# Patient Record
Sex: FEMALE | Race: WHITE | Employment: FULL TIME | URBAN - METROPOLITAN AREA
[De-identification: names, ages, dates, MRNs, and addresses within clinical notes are randomized per-mention and may not be internally consistent; named-entity substitution may affect disease eponyms.]

---

## 2016-12-14 LAB
CHOLESTEROL, TOTAL: 229 MG/DL
CHOLESTEROL, TOTAL: 229 MG/DL
CHOLESTEROL/HDL RATIO: NORMAL
GLUCOSE BLD-MCNC: 88 MG/DL
HDLC SERPL-MCNC: 68 MG/DL (ref 35–70)
LDL CHOLESTEROL CALCULATED: 138 MG/DL (ref 0–160)
TRIGL SERPL-MCNC: 115 MG/DL
VLDLC SERPL CALC-MCNC: NORMAL MG/DL

## 2017-01-29 DIAGNOSIS — K21.9 GASTROESOPHAGEAL REFLUX DISEASE, ESOPHAGITIS PRESENCE NOT SPECIFIED: ICD-10-CM

## 2017-01-31 RX ORDER — PANTOPRAZOLE SODIUM 40 MG/1
TABLET, DELAYED RELEASE ORAL
Qty: 30 TABLET | Refills: 4 | Status: SHIPPED | OUTPATIENT
Start: 2017-01-31 | End: 2017-05-22 | Stop reason: SDUPTHER

## 2017-03-04 DIAGNOSIS — F41.9 ANXIETY: ICD-10-CM

## 2017-03-06 RX ORDER — LORAZEPAM 0.5 MG/1
TABLET ORAL
Qty: 30 TABLET | Refills: 2 | OUTPATIENT
Start: 2017-03-06

## 2017-04-05 RX ORDER — HYDROCHLOROTHIAZIDE 25 MG/1
TABLET ORAL
Qty: 15 TABLET | Refills: 0 | Status: SHIPPED | OUTPATIENT
Start: 2017-04-05 | End: 2017-05-22 | Stop reason: SDUPTHER

## 2017-04-05 RX ORDER — HYDROCHLOROTHIAZIDE 25 MG/1
TABLET ORAL
Qty: 30 TABLET | Refills: 0 | OUTPATIENT
Start: 2017-04-05

## 2017-04-25 RX ORDER — HYDROCHLOROTHIAZIDE 25 MG/1
TABLET ORAL
Qty: 15 TABLET | Refills: 0 | OUTPATIENT
Start: 2017-04-25

## 2017-05-22 ENCOUNTER — OFFICE VISIT (OUTPATIENT)
Dept: FAMILY MEDICINE CLINIC | Age: 52
End: 2017-05-22
Payer: COMMERCIAL

## 2017-05-22 VITALS
BODY MASS INDEX: 26.54 KG/M2 | WEIGHT: 149.8 LBS | SYSTOLIC BLOOD PRESSURE: 136 MMHG | RESPIRATION RATE: 18 BRPM | OXYGEN SATURATION: 98 % | TEMPERATURE: 97 F | HEIGHT: 63 IN | DIASTOLIC BLOOD PRESSURE: 87 MMHG | HEART RATE: 74 BPM

## 2017-05-22 DIAGNOSIS — Z76.0 MEDICATION REFILL: ICD-10-CM

## 2017-05-22 DIAGNOSIS — F41.9 ANXIETY: Primary | ICD-10-CM

## 2017-05-22 PROCEDURE — 99213 OFFICE O/P EST LOW 20 MIN: CPT | Performed by: NURSE PRACTITIONER

## 2017-05-22 RX ORDER — ESCITALOPRAM OXALATE 10 MG/1
TABLET ORAL
Qty: 30 TABLET | Refills: 5 | Status: SHIPPED | OUTPATIENT
Start: 2017-05-22 | End: 2018-01-25 | Stop reason: SDUPTHER

## 2017-05-22 RX ORDER — PANTOPRAZOLE SODIUM 40 MG/1
TABLET, DELAYED RELEASE ORAL
Qty: 30 TABLET | Refills: 5 | Status: SHIPPED | OUTPATIENT
Start: 2017-05-22 | End: 2018-01-23 | Stop reason: SDUPTHER

## 2017-05-22 RX ORDER — LORAZEPAM 0.5 MG/1
0.5 TABLET ORAL DAILY PRN
Qty: 30 TABLET | Refills: 1 | Status: SHIPPED | OUTPATIENT
Start: 2017-05-22 | End: 2018-01-25 | Stop reason: SDUPTHER

## 2017-05-22 RX ORDER — HYDROCHLOROTHIAZIDE 25 MG/1
TABLET ORAL
Qty: 30 TABLET | Refills: 5 | Status: SHIPPED | OUTPATIENT
Start: 2017-05-22 | End: 2017-12-10 | Stop reason: SDUPTHER

## 2017-05-22 RX ORDER — BUPROPION HYDROCHLORIDE 150 MG/1
150 TABLET ORAL DAILY
Qty: 30 TABLET | Refills: 5 | Status: SHIPPED | OUTPATIENT
Start: 2017-05-22 | End: 2017-12-10 | Stop reason: SDUPTHER

## 2017-05-22 ASSESSMENT — ENCOUNTER SYMPTOMS
CONSTIPATION: 0
RHINORRHEA: 0
CHEST TIGHTNESS: 0
SHORTNESS OF BREATH: 0
BLOOD IN STOOL: 0
COUGH: 0
ABDOMINAL PAIN: 0
DIARRHEA: 0
SINUS PRESSURE: 0
EYE DISCHARGE: 0

## 2017-05-22 ASSESSMENT — PATIENT HEALTH QUESTIONNAIRE - PHQ9
1. LITTLE INTEREST OR PLEASURE IN DOING THINGS: 0
SUM OF ALL RESPONSES TO PHQ9 QUESTIONS 1 & 2: 0
2. FEELING DOWN, DEPRESSED OR HOPELESS: 0
SUM OF ALL RESPONSES TO PHQ QUESTIONS 1-9: 0

## 2017-06-12 RX ORDER — ESCITALOPRAM OXALATE 20 MG/1
TABLET ORAL
Qty: 15 TABLET | Refills: 2 | OUTPATIENT
Start: 2017-06-12

## 2017-08-16 PROBLEM — Z41.1 ENCOUNTER FOR COSMETIC SURGERY: Status: ACTIVE | Noted: 2017-08-16

## 2018-01-23 DIAGNOSIS — Z76.0 MEDICATION REFILL: ICD-10-CM

## 2018-01-23 NOTE — TELEPHONE ENCOUNTER
Last filled 5/22/17 #30 with 5 RF  Last seen 5/22/17. Next Visit Date:  Future Appointments  Date Time Provider Tania Lagos   1/25/2018 11:30 AM Vianey Armando CNP [facility] [address] Maintenance   Topic Date Due    HIV screen  12/13/1982    DTaP/Tdap/Td vaccine (1 - Tdap) 12/13/1986    Flu vaccine (1) 09/01/2017    Breast cancer screen  12/13/2017    Colon cancer screen colonoscopy  12/13/2017    Potassium monitoring  03/06/2018    Creatinine monitoring  03/06/2018    Diabetes screen  05/22/2018    Cervical cancer screen  08/21/2018    Lipid screen  12/14/2021       Hemoglobin A1C (%)   Date Value   05/22/2015 5.0             ( goal A1C is < 7)   Microalb/Crt.  Ratio (mcg/mg creat)   Date Value   05/22/2015 19     LDL Cholesterol (mg/dL)   Date Value   05/22/2015 135 (H)     LDL Calculated (mg/dL)   Date Value   12/14/2016 138       (goal LDL is <100)   AST (U/L)   Date Value   05/22/2015 15     ALT (U/L)   Date Value   05/22/2015 11     BUN (mg/dL)   Date Value   03/06/2017 15     BP Readings from Last 3 Encounters:   08/14/17 (!) 146/81   07/06/17 (!) 151/99   05/22/17 136/87          (goal 120/80)    All Future Testing planned in [facility]              Patient Active Problem List:     Anxiety     Depression     GERD (gastroesophageal reflux disease)     Hyperlipidemia     Anemia     Endometriosis     ASCUS (atypical squamous cells of undetermined significance) on Pap smear     Neoplasm of uncertain behavior of skin     Essential hypertension     Encounter for cosmetic surgery

## 2018-01-24 RX ORDER — PANTOPRAZOLE SODIUM 40 MG/1
TABLET, DELAYED RELEASE ORAL
Qty: 90 TABLET | Refills: 1 | Status: SHIPPED | OUTPATIENT
Start: 2018-01-24 | End: 2018-07-29 | Stop reason: SDUPTHER

## 2018-01-25 ENCOUNTER — OFFICE VISIT (OUTPATIENT)
Dept: FAMILY MEDICINE CLINIC | Age: 53
End: 2018-01-25
Payer: COMMERCIAL

## 2018-01-25 VITALS
OXYGEN SATURATION: 100 % | HEART RATE: 84 BPM | DIASTOLIC BLOOD PRESSURE: 80 MMHG | WEIGHT: 156 LBS | TEMPERATURE: 97.2 F | BODY MASS INDEX: 27.64 KG/M2 | SYSTOLIC BLOOD PRESSURE: 132 MMHG | HEIGHT: 63 IN | RESPIRATION RATE: 20 BRPM

## 2018-01-25 DIAGNOSIS — Z12.11 SCREENING FOR MALIGNANT NEOPLASM OF COLON: ICD-10-CM

## 2018-01-25 DIAGNOSIS — Z76.0 MEDICATION REFILL: ICD-10-CM

## 2018-01-25 DIAGNOSIS — F41.9 ANXIETY: Primary | ICD-10-CM

## 2018-01-25 DIAGNOSIS — Z12.39 SCREENING FOR MALIGNANT NEOPLASM OF BREAST: ICD-10-CM

## 2018-01-25 PROCEDURE — 99214 OFFICE O/P EST MOD 30 MIN: CPT | Performed by: NURSE PRACTITIONER

## 2018-01-25 RX ORDER — LORAZEPAM 0.5 MG/1
0.5 TABLET ORAL DAILY PRN
Qty: 30 TABLET | Refills: 1 | Status: SHIPPED | OUTPATIENT
Start: 2018-01-25 | End: 2018-03-26

## 2018-01-25 RX ORDER — BUPROPION HYDROCHLORIDE 150 MG/1
TABLET ORAL
Qty: 30 TABLET | Refills: 5 | Status: SHIPPED | OUTPATIENT
Start: 2018-01-25 | End: 2018-11-26 | Stop reason: SDUPTHER

## 2018-01-25 RX ORDER — HYDROCHLOROTHIAZIDE 25 MG/1
TABLET ORAL
Qty: 30 TABLET | Refills: 5 | Status: SHIPPED | OUTPATIENT
Start: 2018-01-25 | End: 2018-09-23 | Stop reason: SDUPTHER

## 2018-01-25 RX ORDER — ESCITALOPRAM OXALATE 10 MG/1
TABLET ORAL
Qty: 30 TABLET | Refills: 5 | Status: SHIPPED | OUTPATIENT
Start: 2018-01-25 | End: 2018-11-26 | Stop reason: SDUPTHER

## 2018-01-25 ASSESSMENT — ENCOUNTER SYMPTOMS
COUGH: 0
SHORTNESS OF BREATH: 0

## 2018-01-25 NOTE — PROGRESS NOTES
Date    COLONOSCOPY  2004    wnl    ENDOMETRIAL BIOPSY  5/18/2012    due to menorrhagia    LAPAROSCOPY  8/22/12    Dx-->Operative, ablation of endometriosis, drainage of Rt Ovarian cyst, pelvic lavage    LEEP  2006    ecc    SKIN BIOPSY Bilateral 6/6/2001    cheeks---intradermal nevus    UPPER GASTROINTESTINAL ENDOSCOPY      UPPER GASTROINTESTINAL ENDOSCOPY  2004    gastritis     Family History   Problem Relation Age of Onset    Uterine Cancer Paternal Grandmother     Colon Cancer Paternal Grandmother     Heart Disease Father     Cancer Maternal Grandmother      bone and breast    Heart Disease Maternal Grandfather      Social History   Substance Use Topics    Smoking status: Never Smoker    Smokeless tobacco: Never Used    Alcohol use Yes      Comment: social      Allergies   Allergen Reactions    Codeine Hives, Nausea And Vomiting and Nausea Only    Oxycodone-Acetaminophen Hives     bilat eyes; no rashes    Percocet [Oxycodone-Acetaminophen] Hives     bilat eyes; no rashes    Simvastatin Rash       Subjective:      Review of Systems   Constitutional: Negative for chills and fever. Respiratory: Negative for cough and shortness of breath. Cardiovascular: Negative for chest pain, palpitations and leg swelling. Psychiatric/Behavioral: The patient is nervous/anxious. Other pertinent ROS in HPI  Objective:     /80 (Site: Left Arm, Position: Sitting, Cuff Size: Medium Adult)   Pulse 84   Temp 97.2 °F (36.2 °C) (Oral)   Resp 20   Ht 5' 2.99\" (1.6 m)   Wt 156 lb (70.8 kg)   SpO2 100%   BMI 27.64 kg/m²    Physical Exam   Constitutional: She is oriented to person, place, and time. She appears well-developed and well-nourished. No distress. HENT:   Head: Normocephalic and atraumatic.    Right Ear: External ear normal.   Left Ear: External ear normal.   Nose: Nose normal.   Mouth/Throat: Oropharynx is clear and moist.   Eyes: Conjunctivae and EOM are normal. Pupils are equal, round,

## 2018-03-02 ENCOUNTER — HOSPITAL ENCOUNTER (OUTPATIENT)
Dept: MAMMOGRAPHY | Age: 53
Discharge: HOME | End: 2018-03-04
Payer: COMMERCIAL

## 2018-03-02 ENCOUNTER — HOSPITAL ENCOUNTER (OUTPATIENT)
Age: 53
Discharge: HOME | End: 2018-03-02
Payer: COMMERCIAL

## 2018-03-02 DIAGNOSIS — Z12.39 SCREENING FOR MALIGNANT NEOPLASM OF BREAST: ICD-10-CM

## 2018-03-02 LAB — FOLLICLE STIMULATING HORMONE: 124.5 U/L (ref 1.7–21.5)

## 2018-03-02 PROCEDURE — 83001 ASSAY OF GONADOTROPIN (FSH): CPT

## 2018-03-02 PROCEDURE — 77063 BREAST TOMOSYNTHESIS BI: CPT

## 2018-03-02 PROCEDURE — 36415 COLL VENOUS BLD VENIPUNCTURE: CPT

## 2018-07-02 DIAGNOSIS — F41.9 CHRONIC ANXIETY: Primary | ICD-10-CM

## 2018-07-02 NOTE — TELEPHONE ENCOUNTER
Last seen and Filled 1/25/18 #30 with 1 RF    Next Visit Date:  No future appointments. Health Maintenance   Topic Date Due    HIV screen  12/13/1982    Shingles Vaccine (1 of 2 - 2 Dose Series) 12/13/2017    Colon cancer screen colonoscopy  12/13/2017    Potassium monitoring  03/06/2018    Creatinine monitoring  03/06/2018    Diabetes screen  05/22/2018    DTaP/Tdap/Td vaccine (1 - Tdap) 12/18/2018 (Originally 12/13/1986)    Flu vaccine (1) 12/18/2018 (Originally 9/1/2018)    Cervical cancer screen  08/21/2018    Breast cancer screen  03/02/2020    Lipid screen  12/14/2021       Hemoglobin A1C (%)   Date Value   05/22/2015 5.0             ( goal A1C is < 7)   Microalb/Crt.  Ratio (mcg/mg creat)   Date Value   05/22/2015 19     LDL Cholesterol (mg/dL)   Date Value   05/22/2015 135 (H)     LDL Calculated (mg/dL)   Date Value   12/14/2016 138       (goal LDL is <100)   AST (U/L)   Date Value   05/22/2015 15     ALT (U/L)   Date Value   05/22/2015 11     BUN (mg/dL)   Date Value   03/06/2017 15     BP Readings from Last 3 Encounters:   01/25/18 132/80   08/14/17 (!) 146/81   07/06/17 (!) 151/99          (goal 120/80)    All Future Testing planned in [facility]  Lab Frequency Next Occurrence   POCT Fecal Immunochemical Test (FIT) Once 12/01/2018               Patient Active Problem List:     Anxiety     Depression     GERD (gastroesophageal reflux disease)     Hyperlipidemia     Anemia     Endometriosis     ASCUS (atypical squamous cells of undetermined significance) on Pap smear     Neoplasm of uncertain behavior of skin     Essential hypertension     Encounter for cosmetic surgery

## 2018-07-06 RX ORDER — LORAZEPAM 0.5 MG/1
TABLET ORAL
Qty: 30 TABLET | Refills: 0 | Status: SHIPPED | OUTPATIENT
Start: 2018-07-06 | End: 2018-11-26 | Stop reason: SDUPTHER

## 2018-09-19 DIAGNOSIS — F41.9 CHRONIC ANXIETY: ICD-10-CM

## 2018-09-19 RX ORDER — LORAZEPAM 0.5 MG/1
TABLET ORAL
Qty: 30 TABLET | Refills: 0 | OUTPATIENT
Start: 2018-09-19 | End: 2019-09-19

## 2018-09-23 DIAGNOSIS — Z76.0 MEDICATION REFILL: ICD-10-CM

## 2018-09-24 RX ORDER — HYDROCHLOROTHIAZIDE 25 MG/1
TABLET ORAL
Qty: 90 TABLET | Refills: 1 | Status: SHIPPED | OUTPATIENT
Start: 2018-09-24 | End: 2019-04-10 | Stop reason: SDUPTHER

## 2018-10-03 ENCOUNTER — TELEPHONE (OUTPATIENT)
Dept: FAMILY MEDICINE CLINIC | Age: 53
End: 2018-10-03

## 2018-10-03 ENCOUNTER — HOSPITAL ENCOUNTER (OUTPATIENT)
Dept: PREADMISSION TESTING | Facility: CLINIC | Age: 53
Discharge: HOME | End: 2018-10-07
Payer: COMMERCIAL

## 2018-10-03 VITALS
BODY MASS INDEX: 26.58 KG/M2 | HEART RATE: 69 BPM | SYSTOLIC BLOOD PRESSURE: 155 MMHG | HEIGHT: 63 IN | WEIGHT: 150 LBS | DIASTOLIC BLOOD PRESSURE: 97 MMHG | RESPIRATION RATE: 16 BRPM

## 2018-10-03 LAB
ANION GAP SERPL CALCULATED.3IONS-SCNC: 18 MMOL/L (ref 9–17)
BUN BLDV-MCNC: 17 MG/DL (ref 6–20)
CHLORIDE BLD-SCNC: 100 MMOL/L (ref 98–107)
CO2: 24 MMOL/L (ref 20–31)
CREAT SERPL-MCNC: 0.63 MG/DL (ref 0.5–0.9)
EKG ATRIAL RATE: 70 BPM
EKG P AXIS: 43 DEGREES
EKG P-R INTERVAL: 122 MS
EKG Q-T INTERVAL: 406 MS
EKG QRS DURATION: 84 MS
EKG QTC CALCULATION (BAZETT): 438 MS
EKG R AXIS: 53 DEGREES
EKG T AXIS: 31 DEGREES
EKG VENTRICULAR RATE: 70 BPM
GFR AFRICAN AMERICAN: >60 ML/MIN
GFR NON-AFRICAN AMERICAN: >60 ML/MIN
GFR SERPL CREATININE-BSD FRML MDRD: NORMAL ML/MIN/{1.73_M2}
GFR SERPL CREATININE-BSD FRML MDRD: NORMAL ML/MIN/{1.73_M2}
GLUCOSE BLD-MCNC: 99 MG/DL (ref 70–99)
POTASSIUM SERPL-SCNC: 3.9 MMOL/L (ref 3.7–5.3)
SODIUM BLD-SCNC: 142 MMOL/L (ref 135–144)

## 2018-10-03 PROCEDURE — 93005 ELECTROCARDIOGRAM TRACING: CPT

## 2018-10-03 PROCEDURE — 82947 ASSAY GLUCOSE BLOOD QUANT: CPT

## 2018-10-03 PROCEDURE — 80051 ELECTROLYTE PANEL: CPT

## 2018-10-03 PROCEDURE — 82565 ASSAY OF CREATININE: CPT

## 2018-10-03 PROCEDURE — 84520 ASSAY OF UREA NITROGEN: CPT

## 2018-10-03 ASSESSMENT — PAIN SCALES - GENERAL: PAINLEVEL_OUTOF10: 0

## 2018-10-18 ENCOUNTER — ANESTHESIA EVENT (OUTPATIENT)
Dept: OPERATING ROOM | Facility: CLINIC | Age: 53
End: 2018-10-18

## 2018-10-18 ENCOUNTER — ANESTHESIA (OUTPATIENT)
Dept: OPERATING ROOM | Facility: CLINIC | Age: 53
End: 2018-10-18

## 2018-10-18 ENCOUNTER — HOSPITAL ENCOUNTER (OUTPATIENT)
Facility: CLINIC | Age: 53
Setting detail: OUTPATIENT SURGERY
Discharge: HOME | End: 2018-10-18
Attending: PLASTIC SURGERY | Admitting: PLASTIC SURGERY

## 2018-10-18 VITALS — DIASTOLIC BLOOD PRESSURE: 64 MMHG | SYSTOLIC BLOOD PRESSURE: 95 MMHG | TEMPERATURE: 93.2 F | OXYGEN SATURATION: 99 %

## 2018-10-18 VITALS
OXYGEN SATURATION: 97 % | SYSTOLIC BLOOD PRESSURE: 152 MMHG | HEART RATE: 68 BPM | TEMPERATURE: 96.8 F | BODY MASS INDEX: 26.58 KG/M2 | RESPIRATION RATE: 12 BRPM | DIASTOLIC BLOOD PRESSURE: 98 MMHG | HEIGHT: 63 IN | WEIGHT: 150 LBS

## 2018-10-18 DIAGNOSIS — G89.18 ACUTE POSTOPERATIVE PAIN: Primary | ICD-10-CM

## 2018-10-18 PROCEDURE — 2500000003 HC RX 250 WO HCPCS: Performed by: ANESTHESIOLOGY

## 2018-10-18 PROCEDURE — 7100000001 HC PACU RECOVERY - ADDTL 15 MIN: Performed by: PLASTIC SURGERY

## 2018-10-18 PROCEDURE — 6360000002 HC RX W HCPCS: Performed by: PLASTIC SURGERY

## 2018-10-18 PROCEDURE — 3600000014 HC SURGERY LEVEL 4 ADDTL 15MIN: Performed by: PLASTIC SURGERY

## 2018-10-18 PROCEDURE — 3700000001 HC ADD 15 MINUTES (ANESTHESIA): Performed by: PLASTIC SURGERY

## 2018-10-18 PROCEDURE — 7100000010 HC PHASE II RECOVERY - FIRST 15 MIN: Performed by: PLASTIC SURGERY

## 2018-10-18 PROCEDURE — 3700000000 HC ANESTHESIA ATTENDED CARE: Performed by: PLASTIC SURGERY

## 2018-10-18 PROCEDURE — 6360000002 HC RX W HCPCS: Performed by: ANESTHESIOLOGY

## 2018-10-18 PROCEDURE — 6370000000 HC RX 637 (ALT 250 FOR IP): Performed by: ANESTHESIOLOGY

## 2018-10-18 PROCEDURE — 2580000003 HC RX 258: Performed by: ANESTHESIOLOGY

## 2018-10-18 PROCEDURE — 2580000003 HC RX 258: Performed by: PLASTIC SURGERY

## 2018-10-18 PROCEDURE — 3600000004 HC SURGERY LEVEL 4 BASE: Performed by: PLASTIC SURGERY

## 2018-10-18 PROCEDURE — 6370000000 HC RX 637 (ALT 250 FOR IP): Performed by: PLASTIC SURGERY

## 2018-10-18 PROCEDURE — 7100000000 HC PACU RECOVERY - FIRST 15 MIN: Performed by: PLASTIC SURGERY

## 2018-10-18 PROCEDURE — L0625 LO FLEX L1-BELOW L5 PRE OTS: HCPCS | Performed by: PLASTIC SURGERY

## 2018-10-18 PROCEDURE — 2709999900 HC NON-CHARGEABLE SUPPLY: Performed by: PLASTIC SURGERY

## 2018-10-18 PROCEDURE — 7100000011 HC PHASE II RECOVERY - ADDTL 15 MIN: Performed by: PLASTIC SURGERY

## 2018-10-18 PROCEDURE — 2500000003 HC RX 250 WO HCPCS: Performed by: PLASTIC SURGERY

## 2018-10-18 RX ORDER — FENTANYL CITRATE 50 UG/ML
25 INJECTION, SOLUTION INTRAMUSCULAR; INTRAVENOUS EVERY 5 MIN PRN
Status: COMPLETED | OUTPATIENT
Start: 2018-10-18 | End: 2018-10-18

## 2018-10-18 RX ORDER — DIPHENHYDRAMINE HYDROCHLORIDE 50 MG/ML
12.5 INJECTION INTRAMUSCULAR; INTRAVENOUS
Status: DISCONTINUED | OUTPATIENT
Start: 2018-10-18 | End: 2018-10-18 | Stop reason: HOSPADM

## 2018-10-18 RX ORDER — PROMETHAZINE HYDROCHLORIDE 25 MG/ML
6.25 INJECTION, SOLUTION INTRAMUSCULAR; INTRAVENOUS
Status: COMPLETED | OUTPATIENT
Start: 2018-10-18 | End: 2018-10-18

## 2018-10-18 RX ORDER — PROPOFOL 10 MG/ML
INJECTION, EMULSION INTRAVENOUS PRN
Status: DISCONTINUED | OUTPATIENT
Start: 2018-10-18 | End: 2018-10-18 | Stop reason: SDUPTHER

## 2018-10-18 RX ORDER — MIDAZOLAM HYDROCHLORIDE 1 MG/ML
1 INJECTION INTRAMUSCULAR; INTRAVENOUS ONCE
Status: COMPLETED | OUTPATIENT
Start: 2018-10-18 | End: 2018-10-18

## 2018-10-18 RX ORDER — PROMETHAZINE HYDROCHLORIDE 25 MG/ML
6.25 INJECTION, SOLUTION INTRAMUSCULAR; INTRAVENOUS
Status: DISCONTINUED | OUTPATIENT
Start: 2018-10-18 | End: 2018-10-18

## 2018-10-18 RX ORDER — HYDROCODONE BITARTRATE AND ACETAMINOPHEN 5; 325 MG/1; MG/1
1 TABLET ORAL
Status: COMPLETED | OUTPATIENT
Start: 2018-10-18 | End: 2018-10-18

## 2018-10-18 RX ORDER — LIDOCAINE HYDROCHLORIDE 10 MG/ML
INJECTION, SOLUTION INFILTRATION; PERINEURAL PRN
Status: DISCONTINUED | OUTPATIENT
Start: 2018-10-18 | End: 2018-10-18 | Stop reason: SDUPTHER

## 2018-10-18 RX ORDER — SCOLOPAMINE TRANSDERMAL SYSTEM 1 MG/1
1 PATCH, EXTENDED RELEASE TRANSDERMAL
Status: DISCONTINUED | OUTPATIENT
Start: 2018-10-18 | End: 2018-10-18 | Stop reason: HOSPADM

## 2018-10-18 RX ORDER — GLYCOPYRROLATE 1 MG/5 ML
SYRINGE (ML) INTRAVENOUS PRN
Status: DISCONTINUED | OUTPATIENT
Start: 2018-10-18 | End: 2018-10-18 | Stop reason: SDUPTHER

## 2018-10-18 RX ORDER — ROCURONIUM BROMIDE 10 MG/ML
INJECTION, SOLUTION INTRAVENOUS PRN
Status: DISCONTINUED | OUTPATIENT
Start: 2018-10-18 | End: 2018-10-18 | Stop reason: SDUPTHER

## 2018-10-18 RX ORDER — CEPHALEXIN 500 MG/1
500 CAPSULE ORAL 3 TIMES DAILY
Qty: 15 CAPSULE | Refills: 0 | Status: SHIPPED | OUTPATIENT
Start: 2018-10-18 | End: 2018-10-23

## 2018-10-18 RX ORDER — SODIUM CHLORIDE, SODIUM LACTATE, POTASSIUM CHLORIDE, CALCIUM CHLORIDE 600; 310; 30; 20 MG/100ML; MG/100ML; MG/100ML; MG/100ML
INJECTION, SOLUTION INTRAVENOUS CONTINUOUS
Status: DISCONTINUED | OUTPATIENT
Start: 2018-10-18 | End: 2018-10-18 | Stop reason: HOSPADM

## 2018-10-18 RX ORDER — DIPHENHYDRAMINE HCL 25 MG
25 CAPSULE ORAL EVERY 4 HOURS PRN
Qty: 24 CAPSULE | Refills: 0 | Status: SHIPPED | OUTPATIENT
Start: 2018-10-18 | End: 2018-10-23

## 2018-10-18 RX ORDER — LABETALOL HYDROCHLORIDE 5 MG/ML
5 INJECTION, SOLUTION INTRAVENOUS EVERY 10 MIN PRN
Status: DISCONTINUED | OUTPATIENT
Start: 2018-10-18 | End: 2018-10-18 | Stop reason: HOSPADM

## 2018-10-18 RX ORDER — ONDANSETRON 2 MG/ML
4 INJECTION INTRAMUSCULAR; INTRAVENOUS
Status: COMPLETED | OUTPATIENT
Start: 2018-10-18 | End: 2018-10-18

## 2018-10-18 RX ORDER — HYDROCODONE BITARTRATE AND ACETAMINOPHEN 5; 325 MG/1; MG/1
1 TABLET ORAL EVERY 6 HOURS PRN
Qty: 10 TABLET | Refills: 0 | Status: SHIPPED | OUTPATIENT
Start: 2018-10-18 | End: 2018-10-21

## 2018-10-18 RX ORDER — SODIUM CHLORIDE, SODIUM LACTATE, POTASSIUM CHLORIDE, CALCIUM CHLORIDE 600; 310; 30; 20 MG/100ML; MG/100ML; MG/100ML; MG/100ML
INJECTION, SOLUTION INTRAVENOUS CONTINUOUS PRN
Status: DISCONTINUED | OUTPATIENT
Start: 2018-10-18 | End: 2018-10-18 | Stop reason: SDUPTHER

## 2018-10-18 RX ADMIN — Medication 0.2 MG: at 10:42

## 2018-10-18 RX ADMIN — FENTANYL CITRATE 50 MCG: 50 INJECTION INTRAMUSCULAR; INTRAVENOUS at 10:43

## 2018-10-18 RX ADMIN — Medication 2 G: at 10:42

## 2018-10-18 RX ADMIN — LIDOCAINE HYDROCHLORIDE 50 MG: 10 INJECTION, SOLUTION INFILTRATION; PERINEURAL at 10:51

## 2018-10-18 RX ADMIN — ROCURONIUM BROMIDE 10 MG: 10 INJECTION INTRAVENOUS at 11:16

## 2018-10-18 RX ADMIN — FENTANYL CITRATE 50 MCG: 50 INJECTION INTRAMUSCULAR; INTRAVENOUS at 12:22

## 2018-10-18 RX ADMIN — ROCURONIUM BROMIDE 40 MG: 10 INJECTION INTRAVENOUS at 10:44

## 2018-10-18 RX ADMIN — FENTANYL CITRATE 50 MCG: 50 INJECTION INTRAMUSCULAR; INTRAVENOUS at 10:50

## 2018-10-18 RX ADMIN — SODIUM CHLORIDE, POTASSIUM CHLORIDE, SODIUM LACTATE AND CALCIUM CHLORIDE: 600; 310; 30; 20 INJECTION, SOLUTION INTRAVENOUS at 10:10

## 2018-10-18 RX ADMIN — ONDANSETRON 4 MG: 2 INJECTION INTRAMUSCULAR; INTRAVENOUS at 12:37

## 2018-10-18 RX ADMIN — SODIUM CHLORIDE, POTASSIUM CHLORIDE, SODIUM LACTATE AND CALCIUM CHLORIDE: 600; 310; 30; 20 INJECTION, SOLUTION INTRAVENOUS at 11:44

## 2018-10-18 RX ADMIN — PROMETHAZINE HYDROCHLORIDE 6.25 MG: 25 INJECTION INTRAMUSCULAR; INTRAVENOUS at 12:55

## 2018-10-18 RX ADMIN — MIDAZOLAM HYDROCHLORIDE 1 MG: 1 INJECTION, SOLUTION INTRAMUSCULAR; INTRAVENOUS at 10:35

## 2018-10-18 RX ADMIN — FENTANYL CITRATE 50 MCG: 50 INJECTION INTRAMUSCULAR; INTRAVENOUS at 11:34

## 2018-10-18 RX ADMIN — HYDROCODONE BITARTRATE AND ACETAMINOPHEN 1 TABLET: 5; 325 TABLET ORAL at 13:22

## 2018-10-18 RX ADMIN — PROPOFOL 200 MG: 10 INJECTION, EMULSION INTRAVENOUS at 10:51

## 2018-10-18 ASSESSMENT — PULMONARY FUNCTION TESTS
PIF_VALUE: 19
PIF_VALUE: 3
PIF_VALUE: 22
PIF_VALUE: 22
PIF_VALUE: 20
PIF_VALUE: 22
PIF_VALUE: 15
PIF_VALUE: 22
PIF_VALUE: 21
PIF_VALUE: 25
PIF_VALUE: 21
PIF_VALUE: 20
PIF_VALUE: 21
PIF_VALUE: 23
PIF_VALUE: 19
PIF_VALUE: 22
PIF_VALUE: 25
PIF_VALUE: 19
PIF_VALUE: 26
PIF_VALUE: 22
PIF_VALUE: 20
PIF_VALUE: 21
PIF_VALUE: 20
PIF_VALUE: 23
PIF_VALUE: 23
PIF_VALUE: 2
PIF_VALUE: 21
PIF_VALUE: 22
PIF_VALUE: 0
PIF_VALUE: 20
PIF_VALUE: 20
PIF_VALUE: 5
PIF_VALUE: 20
PIF_VALUE: 12
PIF_VALUE: 12
PIF_VALUE: 21
PIF_VALUE: 1
PIF_VALUE: 21
PIF_VALUE: 20
PIF_VALUE: 23
PIF_VALUE: 1
PIF_VALUE: 24
PIF_VALUE: 8
PIF_VALUE: 21
PIF_VALUE: 22
PIF_VALUE: 19
PIF_VALUE: 38
PIF_VALUE: 21
PIF_VALUE: 23
PIF_VALUE: 20
PIF_VALUE: 1
PIF_VALUE: 22
PIF_VALUE: 1
PIF_VALUE: 21
PIF_VALUE: 19
PIF_VALUE: 2
PIF_VALUE: 22
PIF_VALUE: 3
PIF_VALUE: 19
PIF_VALUE: 20
PIF_VALUE: 23
PIF_VALUE: 22
PIF_VALUE: 5
PIF_VALUE: 21
PIF_VALUE: 27
PIF_VALUE: 18
PIF_VALUE: 22
PIF_VALUE: 4
PIF_VALUE: 0
PIF_VALUE: 20
PIF_VALUE: 3
PIF_VALUE: 20
PIF_VALUE: 13
PIF_VALUE: 20
PIF_VALUE: 20
PIF_VALUE: 21
PIF_VALUE: 22
PIF_VALUE: 22
PIF_VALUE: 21
PIF_VALUE: 19
PIF_VALUE: 21
PIF_VALUE: 22
PIF_VALUE: 1
PIF_VALUE: 21
PIF_VALUE: 19
PIF_VALUE: 22
PIF_VALUE: 24
PIF_VALUE: 23
PIF_VALUE: 22
PIF_VALUE: 22
PIF_VALUE: 23

## 2018-10-18 ASSESSMENT — PAIN SCALES - GENERAL
PAINLEVEL_OUTOF10: 8
PAINLEVEL_OUTOF10: 7

## 2018-10-18 ASSESSMENT — LIFESTYLE VARIABLES: SMOKING_STATUS: 0

## 2018-10-18 ASSESSMENT — PAIN - FUNCTIONAL ASSESSMENT: PAIN_FUNCTIONAL_ASSESSMENT: 0-10

## 2018-10-18 NOTE — H&P
Nausea Only    Oxycodone-Acetaminophen Hives     bilat eyes; no rashes    Percocet [Oxycodone-Acetaminophen] Hives     bilat eyes; no rashes    Simvastatin Rash       Family History   Problem Relation Age of Onset    Uterine Cancer Paternal Grandmother     Colon Cancer Paternal Grandmother     Heart Disease Father     Cancer Maternal Grandmother         bone and breast    Heart Disease Maternal Grandfather        Social History     Social History    Marital status:      Spouse name: N/A    Number of children: N/A    Years of education: N/A     Occupational History    Not on file. Social History Main Topics    Smoking status: Never Smoker    Smokeless tobacco: Never Used    Alcohol use Yes      Comment: social    Drug use: No    Sexual activity: Not on file     Other Topics Concern    Not on file     Social History Narrative    No narrative on file       Physical Exam:    Nursing notes and Vitals reviewed. There were no vitals filed for this visit. GENERAL: A & O x3, pt in no apparent distress. HEENT:  NCAT, PERRL, EOMI, oral mucus membrane pink and moist, no lymphadenopathy palpated on neck exam   HEART: Normal Heart sounds,  RRR  LUNGS: Clear to auscultation bilaterally, no wheezes, no respiratory distress. ABDOMEN: Soft, non-tender, non-distended  EXTREMITIES: Moves all four extremities without difficulty, negative  SKIN: Skin color, texture, turgor normal. No rashes or lesions. She does have some prominence in contour to her flanks and abdomen. NEURO: CN II-XII grossly intact. No motor or sensory deficits appreciated. MUSCULOSKELETAL: normal throughout upper and lower extremities    Assessment:  Gonzales Calle comes in today to discuss options of liposuction to her abdomen and flanks. Plan:  The proposed procedure for Liposuction was explained to the patient at great length including the cannula entry sites.   The patient also understands the risks of liposuction include but are not

## 2018-10-18 NOTE — ANESTHESIA PRE PROCEDURE
Department of Anesthesiology  Preprocedure Note       Name:  Lucas Lesser   Age:  50 y.o.  :  1965                                          MRN:  [id number]         Date:  10/18/2018      Surgeon: Leia Sullivan): Aparna Hathaway MD    Procedure: COSMETIC - LIPOSUCTION ABDOMEN, FLANKS, LOWER BACK SUCTION (N/A )    Department of Anesthesiology  Pre-Anesthesia Evaluation/Consultation         Name:  Lucas Lesser                                         Age:  50 y.o.   MRN:  [id number]             Medications  Current Facility-Administered Medications   Medication Dose Route Frequency Provider Last Rate Last Dose    ceFAZolin (ANCEF) 2 g in dextrose 5 % 50 mL IVPB  2 g Intravenous Once A Misti Huizar MD           Allergies   Allergen Reactions    Codeine Hives, Nausea And Vomiting and Nausea Only    Oxycodone-Acetaminophen Hives     bilat eyes; no rashes    Percocet [Oxycodone-Acetaminophen] Hives     bilat eyes; no rashes    Simvastatin Rash     Patient Active Problem List   Diagnosis    Anxiety    Depression    GERD (gastroesophageal reflux disease)    Hyperlipidemia    Anemia    Endometriosis    ASCUS (atypical squamous cells of undetermined significance) on Pap smear    Neoplasm of uncertain behavior of skin    Essential hypertension    Encounter for cosmetic surgery     Past Medical History:   Diagnosis Date    ASCUS (atypical squamous cells of undetermined significance) on Pap smear     Depression     Endometriosis     Found on scope 12    GERD (gastroesophageal reflux disease)     HTN (hypertension)     Hyperlipidemia     Iron deficiency anemia     Menometrorrhagia     Sinus problem      Past Surgical History:   Procedure Laterality Date    COLONOSCOPY      wnl    ENDOMETRIAL BIOPSY  2012    due to menorrhagia    LAPAROSCOPY  12    Dx-->Operative, ablation of endometriosis, drainage of Rt Ovarian cyst, pelvic lavage    LEEP      ecc    SKIN BIOPSY 10/03/2018    BUN 17 10/03/2018    CREATININE 0.63 10/03/2018    CALCIUM 9.4 03/06/2017    GFRAA >60 10/03/2018    LABGLOM >60 10/03/2018    GLUCOSE 99 10/03/2018    GLUCOSE 91 05/11/2012       POC Testing  No results for input(s): POCGLU, POCNA, POCK, POCCL, POCBUN, POCHEMO, POCHCT in the last 72 hours. Coags    Lab Results   Component Value Date    PROTIME 10.6 05/11/2012    INR 1.0 05/11/2012    APTT 27.0 05/11/2012       HCG (If Applicable)   Lab Results   Component Value Date    PREGTESTUR NEGATIVE 08/13/2012    HCG NEGATIVE 08/22/2012    HCGQUANT <2 05/11/2012        ABGs No results found for: PHART, PO2ART, NKF9AYK, BCR5BAG, BEART, X3HBYRZS     Type & Screen (If Applicable)  No results found for: LABABO, [address]    Radiology (If Applicable)    Cardiac Testing (If Applicable)     EKG (If Applicable) PVC,s          Medications prior to admission:   Prior to Admission medications    Medication Sig Start Date End Date Taking? Authorizing Provider   hydrochlorothiazide (HYDRODIURIL) 25 MG tablet TAKE ONE TABLET BY MOUTH DAILY 9/24/18  Yes Thea Gowers, APRN - CNP   pantoprazole (PROTONIX) 40 MG tablet TAKE ONE TABLET BY MOUTH DAILY 7/30/18  Yes Thea Gowers, APRN - CNP   LORazepam (ATIVAN) 0.5 MG tablet TAKE ONE TABLET BY MOUTH DAILY AS NEEDED FOR ANXIETY FOR UP TO 60 DAYS 7/6/18 7/6/19 Yes Thea Gowers, APRN - CNP   buPROPion (WELLBUTRIN XL) 150 MG extended release tablet TAKE ONE TABLET BY MOUTH DAILY 1/25/18  Yes Thea Gowers, APRN - CNP   escitalopram (LEXAPRO) 10 MG tablet TAKE ONE-HALF TABLET BY MOUTH DAILY 1/25/18  Yes Thea Gowers, APRN - CNP   Hydroquinone 2 % GEL Apply to affected area once daily. 3/22/17  Yes ERYN Turpin MD   Cyanocobalamin (VITAMIN B-12 PO) Take  by mouth. Yes Historical Provider, MD       Current medications:    No current facility-administered medications for this encounter. Allergies:     Allergies   Allergen Reactions    10/18/18 (!) 143/98   10/03/18 (!) 155/97   09/05/18 (!) 158/81       NPO Status: Time of last liquid consumption: 2142                        Time of last solid consumption: 2143                        Date of last liquid consumption: 10/17/18                        Date of last solid food consumption: 10/17/18    BMI:   Wt Readings from Last 3 Encounters:   10/18/18 150 lb (68 kg)   10/03/18 150 lb (68 kg)   09/05/18 150 lb (68 kg)     Body mass index is 26.57 kg/m². CBC:   Lab Results   Component Value Date    WBC 5.1 03/06/2017    RBC 4.33 03/06/2017    RBC 3.70 05/11/2012    HGB 11.0 03/06/2017    HCT 34.0 03/06/2017    MCV 78.4 03/06/2017    RDW 16.5 03/06/2017     03/06/2017     05/11/2012       CMP:   Lab Results   Component Value Date     10/03/2018    K 3.9 10/03/2018     10/03/2018    CO2 24 10/03/2018    BUN 17 10/03/2018    CREATININE 0.63 10/03/2018    GFRAA >60 10/03/2018    LABGLOM >60 10/03/2018    GLUCOSE 99 10/03/2018    GLUCOSE 91 05/11/2012    PROT 7.9 05/22/2015    CALCIUM 9.4 03/06/2017    BILITOT 0.41 05/22/2015    ALKPHOS 45 05/22/2015    AST 15 05/22/2015    ALT 11 05/22/2015       POC Tests: No results for input(s): POCGLU, POCNA, POCK, POCCL, POCBUN, POCHEMO, POCHCT in the last 72 hours.     Coags:   Lab Results   Component Value Date    PROTIME 10.6 05/11/2012    INR 1.0 05/11/2012    APTT 27.0 05/11/2012       HCG (If Applicable):   Lab Results   Component Value Date    PREGTESTUR NEGATIVE 08/13/2012    HCG NEGATIVE 08/22/2012    HCGQUANT <2 05/11/2012        ABGs: No results found for: PHART, PO2ART, XLK3DKU, LUZ7QYP, BEART, [id number]     Type & Screen (If Applicable):  No results found for: LABABO, LABRH    Anesthesia Evaluation   no history of anesthetic complications:   Airway: Mallampati: II     Neck ROM: full   Dental:          Pulmonary:       (-) recent URI and not a current smoker                           Cardiovascular:    (+) hypertension:,

## 2018-10-26 NOTE — OP NOTE
11:50 AM             Date of service: 10/18/2018     Pre-operative Diagnosis: Lipodystrophy with skin laxity and excess     Post-operative Diagnosis: Same     Procedure: Liposuction of abdomen, flanks, suprapubic area, and upper back. Total of 4300 ml of yellow fatty aspirate. 2000 mL of tumescent fluid instilled.     Anesthesia: General     Surgeons/Assistants: Brandi     Estimated Blood Loss: less than 749     Complications: None     Specimens: Was Not Obtained     Findings: Nice shape and contour postoperatively.     Indications:  Patient presents with lipodystrophy of her abdomen flanks and suprapubic area as well as the upper back. She now presents for liposuction of these regions under general anesthesia using a tumescent technique. I discussed the procedure with the patient at great length. She does understand that she is a better candidate for an abdominoplasty given the skin ptosis that she has and with liposuction she will have more skin ptosis as well as her high risk for contour irregularities such as dimpling and depressions. She also understands the risk of infection, bleeding, scar formation, dimpling, depressions, contour irregularities, and wishes to proceed.     Procedure:  Patient was brought into the operating room and placed under general anesthesia. Her abdomen flanks and upper back were prepped and draped in sterile fashion. Tumescent fluid was instilled into her abdomen flanks and upper back through small stab wound incisions using a #15 blade. Tumescent liposuction was then undertaken using a small liposuction cannula to the abdomen, flanks bilaterally, suprapubic region, and upper back region. Patient tolerated the procedure well. The incisions were closed with 4-0 Monocryl in a deep dermal closure. An abdominal binder was placed and she was taken to postop recovery in stable condition after general anesthesia was reversed.   Appropriate postoperative fluid replacement will

## 2018-10-30 DIAGNOSIS — Z76.0 MEDICATION REFILL: ICD-10-CM

## 2018-10-31 RX ORDER — PANTOPRAZOLE SODIUM 40 MG/1
TABLET, DELAYED RELEASE ORAL
Qty: 90 TABLET | Refills: 1 | Status: SHIPPED | OUTPATIENT
Start: 2018-10-31 | End: 2019-05-08 | Stop reason: SDUPTHER

## 2018-11-26 ENCOUNTER — OFFICE VISIT (OUTPATIENT)
Dept: FAMILY MEDICINE CLINIC | Age: 53
End: 2018-11-26
Payer: COMMERCIAL

## 2018-11-26 VITALS
TEMPERATURE: 97.6 F | SYSTOLIC BLOOD PRESSURE: 118 MMHG | DIASTOLIC BLOOD PRESSURE: 78 MMHG | BODY MASS INDEX: 27.88 KG/M2 | OXYGEN SATURATION: 99 % | WEIGHT: 157.4 LBS | HEART RATE: 79 BPM

## 2018-11-26 DIAGNOSIS — F41.9 CHRONIC ANXIETY: ICD-10-CM

## 2018-11-26 DIAGNOSIS — Z76.0 MEDICATION REFILL: ICD-10-CM

## 2018-11-26 PROCEDURE — 99213 OFFICE O/P EST LOW 20 MIN: CPT | Performed by: NURSE PRACTITIONER

## 2018-11-26 RX ORDER — ESCITALOPRAM OXALATE 10 MG/1
TABLET ORAL
Qty: 90 TABLET | Refills: 1 | Status: SHIPPED | OUTPATIENT
Start: 2018-11-26 | End: 2019-07-22 | Stop reason: SDUPTHER

## 2018-11-26 RX ORDER — BUPROPION HYDROCHLORIDE 150 MG/1
TABLET ORAL
Qty: 90 TABLET | Refills: 1 | Status: SHIPPED | OUTPATIENT
Start: 2018-11-26 | End: 2019-07-22 | Stop reason: SDUPTHER

## 2018-11-26 RX ORDER — LORAZEPAM 0.5 MG/1
TABLET ORAL
Qty: 30 TABLET | Refills: 1 | Status: SHIPPED | OUTPATIENT
Start: 2018-11-26 | End: 2019-07-22 | Stop reason: SDUPTHER

## 2018-11-26 ASSESSMENT — PATIENT HEALTH QUESTIONNAIRE - PHQ9
1. LITTLE INTEREST OR PLEASURE IN DOING THINGS: 0
2. FEELING DOWN, DEPRESSED OR HOPELESS: 1
SUM OF ALL RESPONSES TO PHQ QUESTIONS 1-9: 1
SUM OF ALL RESPONSES TO PHQ9 QUESTIONS 1 & 2: 1
SUM OF ALL RESPONSES TO PHQ QUESTIONS 1-9: 1

## 2019-06-07 RX ORDER — LORAZEPAM 0.5 MG/1
TABLET ORAL
Qty: 30 TABLET | Refills: 0 | OUTPATIENT
Start: 2019-06-07

## 2019-07-22 ENCOUNTER — OFFICE VISIT (OUTPATIENT)
Dept: FAMILY MEDICINE CLINIC | Age: 54
End: 2019-07-22
Payer: COMMERCIAL

## 2019-07-22 VITALS
SYSTOLIC BLOOD PRESSURE: 122 MMHG | HEART RATE: 92 BPM | OXYGEN SATURATION: 96 % | WEIGHT: 167 LBS | TEMPERATURE: 97.5 F | BODY MASS INDEX: 29.58 KG/M2 | DIASTOLIC BLOOD PRESSURE: 80 MMHG

## 2019-07-22 DIAGNOSIS — N62 MACROMASTIA: ICD-10-CM

## 2019-07-22 DIAGNOSIS — F41.9 CHRONIC ANXIETY: Primary | ICD-10-CM

## 2019-07-22 DIAGNOSIS — Z76.0 MEDICATION REFILL: ICD-10-CM

## 2019-07-22 PROCEDURE — 99213 OFFICE O/P EST LOW 20 MIN: CPT | Performed by: NURSE PRACTITIONER

## 2019-07-22 RX ORDER — LORAZEPAM 0.5 MG/1
TABLET ORAL
Qty: 30 TABLET | Refills: 1 | Status: SHIPPED | OUTPATIENT
Start: 2019-07-22 | End: 2020-03-20 | Stop reason: SDUPTHER

## 2019-07-22 RX ORDER — BUPROPION HYDROCHLORIDE 150 MG/1
TABLET ORAL
Qty: 90 TABLET | Refills: 1 | Status: SHIPPED | OUTPATIENT
Start: 2019-07-22 | End: 2020-08-06

## 2019-07-22 RX ORDER — ESCITALOPRAM OXALATE 10 MG/1
TABLET ORAL
Qty: 90 TABLET | Refills: 1 | Status: SHIPPED | OUTPATIENT
Start: 2019-07-22 | End: 2020-07-10

## 2019-07-22 ASSESSMENT — ENCOUNTER SYMPTOMS
SHORTNESS OF BREATH: 0
COUGH: 0

## 2019-07-22 NOTE — LETTER
MEDICATION AGREEMENT     Amenavikiclemencia Greene  [birth year]      For certain conditions, multiple classes of medications may be used to help better manage your symptoms, and to improve your ability to function at home, work and in social settings. However, these medications do have risks, which will be discussed with you, including addiction and dependency. The following prescribed medications need frequent monitoring and will require you to partner and assist in your healthcare. Medication  Dose, instructions and quantity as indicated on current prescription bottle Diagnosis/Reason(s) for Taking Category   Lorazepam (Ativan) 0.5mg     Anxiety    1 Tablet daily as needed                            Benefits and goals of Controlled Substance Medications: There are two potential goals for your treatment: (1) decreased pain and suffering (2) improved daily life functions. There are many possible treatments for your chronic condition(s), and, in addition to controlled substance medications, we will try alternatives such as physical therapy, yoga, massage, home daily exercise, meditation, relaxation techniques, injections, chiropractic manipulations, surgery, cognitive therapy, hypnosis and many medications that are not habit-forming. Use of controlled substance medications may be helpful, but they are unlikely to resolve all of your symptoms or restore all function. Risks of Controlled Substance Medications:    Opioid pain medications: These medications can lead to problems such as addiction/dependence, sedation, lightheadedness/dizziness, memory issues, falls, constipation, nausea, or vomiting. They may also impair the ability to drive or operate machinery. Additionally, these medications may lower testosterone levels, leading to loss of bone strength, stamina and sex drive.   They may cause problems with breathing, sleep apnea and reduced coughing, which are especially dangerous for patients with lung which may also result in my being prevented from receiving further care from this office. · Other:____________________________________________________________________    AGREEMENT:    I have read the above and have had all of my questions answered. For chronic disease management, I know that my symptoms can be managed with many types of treatments. A chronic medication trial may be part of my treatment, but I must be an active participant in my care. Medication therapy is only one part of my symptom management plan. In some cases, there may be limited scientific evidence to support the chronic use of certain medications to improve symptoms and daily function. Furthermore, in certain circumstances, there may be scientific information that suggests that use of chronic controlled substances may actually worsen my symptoms and increase my risk of unintentional death directly related to this medication therapy. I know that if my provider feels my risk from controlled medications is greater than my benefit, I will have my controlled substance medication(s) compassionately lowered or removed altogether. I agree to a controlled substance medication trial.      I further agree to allow this office to contact my HIPAA contact on file if there are concerns about my safety and use of controlled medications. I have agreed to use the following medications above as instructed by my physician and as stated in this [id number].      Patient Signature:  ______________________  Date:7/22/2019 or _____________    Provider Signature:______________________  Date:7/22/2019 or _____________

## 2019-09-07 DIAGNOSIS — Z76.0 MEDICATION REFILL: ICD-10-CM

## 2019-09-09 RX ORDER — PANTOPRAZOLE SODIUM 40 MG/1
TABLET, DELAYED RELEASE ORAL
Qty: 30 TABLET | Refills: 5 | Status: SHIPPED | OUTPATIENT
Start: 2019-09-09 | End: 2020-03-16

## 2019-11-06 PROBLEM — N64.4 BILATERAL MASTODYNIA: Status: ACTIVE | Noted: 2019-11-06

## 2019-11-06 PROBLEM — S16.1XXA ACUTE CERVICAL MYOFASCIAL STRAIN: Status: ACTIVE | Noted: 2019-11-06

## 2019-11-06 PROBLEM — M54.6 CHRONIC BILATERAL THORACIC BACK PAIN: Status: ACTIVE | Noted: 2019-11-06

## 2019-11-06 PROBLEM — N62 HYPERTROPHY OF BREAST: Status: ACTIVE | Noted: 2019-11-06

## 2019-11-06 PROBLEM — G89.29 CHRONIC BILATERAL THORACIC BACK PAIN: Status: ACTIVE | Noted: 2019-11-06

## 2019-11-06 PROBLEM — R21 RASH, SKIN: Status: ACTIVE | Noted: 2019-11-06

## 2019-11-07 ENCOUNTER — TELEPHONE (OUTPATIENT)
Dept: FAMILY MEDICINE CLINIC | Age: 54
End: 2019-11-07

## 2019-11-07 DIAGNOSIS — Z12.39 SCREENING FOR MALIGNANT NEOPLASM OF BREAST: Primary | ICD-10-CM

## 2019-11-11 ENCOUNTER — HOSPITAL ENCOUNTER (OUTPATIENT)
Dept: MAMMOGRAPHY | Age: 54
Discharge: HOME | End: 2019-11-13
Payer: COMMERCIAL

## 2019-11-11 DIAGNOSIS — Z12.39 SCREENING FOR MALIGNANT NEOPLASM OF BREAST: ICD-10-CM

## 2019-11-11 PROCEDURE — 77063 BREAST TOMOSYNTHESIS BI: CPT

## 2020-03-16 RX ORDER — PANTOPRAZOLE SODIUM 40 MG/1
TABLET, DELAYED RELEASE ORAL
Qty: 90 TABLET | Refills: 1 | Status: SHIPPED | OUTPATIENT
Start: 2020-03-16 | End: 2020-09-10

## 2020-03-16 NOTE — TELEPHONE ENCOUNTER
Last visit: 7/22/19  Last Med refill: 9/9/19  Does patient have enough medication for 72 hours: No:     Next Visit Date:  Future Appointments   Date Time Provider Tania Lagos   4/2/2020  2:30 PM LUPE Davis - CNP [facility]   4/22/2020  9:00 AM ERYN Foster MD [facility] None       Health Maintenance   Topic Date Due    HIV screen  12/13/1982    Shingles Vaccine (1 of 2) 12/13/2017    Colon cancer screen colonoscopy  12/13/2017    Cervical cancer screen  08/21/2018    Flu vaccine (1) 09/01/2019    Potassium monitoring  10/03/2019    Creatinine monitoring  10/03/2019    DTaP/Tdap/Td vaccine (1 - Tdap) 07/22/2020 (Originally 12/13/1986)    Breast cancer screen  11/11/2021    Lipid screen  12/14/2021    Hepatitis A vaccine  Aged Out    Hepatitis B vaccine  Aged Out    Hib vaccine  Aged Out    Meningococcal (ACWY) vaccine  Aged Out    Pneumococcal 0-64 years Vaccine  Aged Out       Hemoglobin A1C (%)   Date Value   05/22/2015 5.0             ( goal A1C is < 7)   Microalb/Crt.  Ratio (mcg/mg creat)   Date Value   05/22/2015 19     LDL Cholesterol (mg/dL)   Date Value   05/22/2015 135 (H)   06/20/2014 147 (H)     LDL Calculated (mg/dL)   Date Value   12/14/2016 138       (goal LDL is <100)   AST (U/L)   Date Value   05/22/2015 15     ALT (U/L)   Date Value   05/22/2015 11     BUN (mg/dL)   Date Value   10/03/2018 17     BP Readings from Last 3 Encounters:   02/03/20 (!) 135/99   07/22/19 122/80   11/26/18 118/78          (goal 120/80)    All Future Testing planned in [facility]  Lab Frequency Next Occurrence   CBC Once 04/09/2020   Comprehensive Metabolic Panel Once 24/23/4883               Patient Active Problem List:     Anxiety     Depression     GERD (gastroesophageal reflux disease)     Hyperlipidemia     Anemia     Endometriosis     ASCUS (atypical squamous cells of undetermined significance) on Pap smear     Neoplasm of uncertain behavior of skin     Essential hypertension     Encounter for cosmetic surgery     Hypertrophy of breast     Bilateral mastodynia     Chronic bilateral thoracic back pain     Acute cervical myofascial strain     Rash, skin

## 2020-03-20 ENCOUNTER — TELEPHONE (OUTPATIENT)
Dept: FAMILY MEDICINE CLINIC | Age: 55
End: 2020-03-20

## 2020-03-20 RX ORDER — LORAZEPAM 0.5 MG/1
TABLET ORAL
Qty: 30 TABLET | Refills: 0 | Status: SHIPPED | OUTPATIENT
Start: 2020-03-20 | End: 2020-05-06 | Stop reason: SDUPTHER

## 2020-03-31 ENCOUNTER — TELEPHONE (OUTPATIENT)
Dept: FAMILY MEDICINE CLINIC | Age: 55
End: 2020-03-31

## 2020-05-06 ENCOUNTER — TELEMEDICINE (OUTPATIENT)
Dept: FAMILY MEDICINE CLINIC | Age: 55
End: 2020-05-06
Payer: COMMERCIAL

## 2020-05-06 PROCEDURE — 99213 OFFICE O/P EST LOW 20 MIN: CPT | Performed by: NURSE PRACTITIONER

## 2020-05-06 RX ORDER — LORAZEPAM 0.5 MG/1
TABLET ORAL
Qty: 30 TABLET | Refills: 2 | Status: SHIPPED | OUTPATIENT
Start: 2020-05-06 | End: 2020-12-04

## 2020-05-06 ASSESSMENT — ENCOUNTER SYMPTOMS
SHORTNESS OF BREATH: 0
COUGH: 0

## 2020-05-06 NOTE — PROGRESS NOTES
VISIT LOCATION: Home    TELEHEALTH EVALUATION -- Audio/Visual (During BYIXG-57 public health emergency)    Due to COVID 23 outbreak, patient's office visit was converted to a virtual visit. Patient was contacted and agreed to proceed with a virtual visit via [address] Visit  The risks and benefits of converting to a virtual visit were discussed in light of the current infectious disease epidemic. Patient also understood that insurance coverage and co-pays are up to their individual insurance plans. Estil Baumgarten (:  1965) has requested an audio/video evaluation for the following concern(s):    Chief Complaint:   HPI:  Estil Baumgarten is here for virtual visit for ativan refill. She works at [employer]- her anxiety is really high during the pandemic. She normally tries to only take prn but it is worse right now. Review of Systems   Constitutional: Negative for chills and fever. Respiratory: Negative for cough and shortness of breath. Cardiovascular: Negative for chest pain, palpitations and leg swelling. Prior to Visit Medications    Medication Sig Taking?  Authorizing Provider   LORazepam (ATIVAN) 0.5 MG tablet TAKE ONE TABLET BY MOUTH DAILY AS NEEDED FOR ANXIETY FOR UP TO 60 DAYS Yes Horta Misa, APRN - CNP   pantoprazole (PROTONIX) 40 MG tablet TAKE ONE TABLET BY MOUTH DAILY Yes Horta Misa, APRN - CNP   hydrochlorothiazide (HYDRODIURIL) 25 MG tablet TAKE ONE TABLET BY MOUTH DAILY Yes Horta Misa, APRN - CNP   escitalopram (LEXAPRO) 10 MG tablet TAKE ONE-HALF TABLET BY MOUTH DAILY Yes Horta Misa, APRN - CNP   buPROPion (WELLBUTRIN XL) 150 MG extended release tablet TAKE ONE TABLET BY MOUTH DAILY Yes Horta Ruby, APRN - CNP     Social- none    Past Medical History:   Diagnosis Date    ASCUS (atypical squamous cells of undetermined significance) on Pap smear     Depression     Endometriosis     Found on scope 12    GERD (gastroesophageal reflux disease)     HTN (hypertension)     Hyperlipidemia     Iron deficiency anemia     Menometrorrhagia     Sinus problem    ,   Past Surgical History:   Procedure Laterality Date    COLONOSCOPY  2004    wnl    ENDOMETRIAL BIOPSY  5/18/2012    due to menorrhagia    LAPAROSCOPY  8/22/12    Dx-->Operative, ablation of endometriosis, drainage of Rt Ovarian cyst, pelvic lavage    LEEP  2006    ecc    LIPOSUCTION  10/18/2018    abdomen , flanks, lower back    [place] OFFICE/OUTPT VISIT,PROCEDURE ONLY N/A 10/18/2018    COSMETIC - LIPOSUCTION ABDOMEN, FLANKS, LOWER BACK SUCTION performed by Isabel Thorpe MD at [address] Bilateral 6/6/2001    cheeks---intradermal nevus    UPPER GASTROINTESTINAL ENDOSCOPY      UPPER GASTROINTESTINAL ENDOSCOPY  2004    gastritis             [] OTHER:    Constitutional: [x] Appears well-developed and well-nourished [] No apparent distress                            [] Abnormal-   Mental status  [x] Alert and awake  [x] Oriented to person/place/time [x]Able to follow commands       Eyes:  EOM    [x]  Normal  [] Abnormal-  Sclera  [x]  Normal  [] Abnormal -         Discharge [x]  None visible  [] Abnormal -     HENT:   [x] Normocephalic, atraumatic.   [] Abnormal   [] Mouth/Throat: Mucous membranes are moist.      External Ears [x] Normal  [] Abnormal-      Neck: [x] No visualized mass      Pulmonary/Chest: [x] Respiratory effort normal.  [] No visualized signs of difficulty breathing or respiratory distress        [] Abnormal-      Musculoskeletal:   [] Normal gait with no signs of ataxia         [x] Normal range of motion of neck        [] Abnormal-   [] Motor grossly intact in visible upper extremities    [] Motor grossly intact in visible lower extremities        Neurological:        [x] No Facial Asymmetry (Cranial nerve 7 motor function) (limited exam to video visit)                       [x] No gaze palsy        [] Abnormal-

## 2020-06-01 RX ORDER — HYDROCHLOROTHIAZIDE 25 MG/1
TABLET ORAL
Qty: 90 TABLET | Refills: 1 | Status: SHIPPED | OUTPATIENT
Start: 2020-06-01 | End: 2020-12-21

## 2020-06-30 ENCOUNTER — HOSPITAL ENCOUNTER (OUTPATIENT)
Age: 55
Discharge: HOME | End: 2020-06-30
Payer: COMMERCIAL

## 2020-06-30 LAB
ANION GAP SERPL CALCULATED.3IONS-SCNC: 14 MMOL/L (ref 9–17)
CHLORIDE BLD-SCNC: 102 MMOL/L (ref 98–107)
CO2: 27 MMOL/L (ref 20–31)
HCT VFR BLD CALC: 40.1 % (ref 36.3–47.1)
HEMOGLOBIN: 12.9 G/DL (ref 11.9–15.1)
MCH RBC QN AUTO: 28.7 PG (ref 25.2–33.5)
MCHC RBC AUTO-ENTMCNC: 32.2 G/DL (ref 28.4–34.8)
MCV RBC AUTO: 89.1 FL (ref 82.6–102.9)
NRBC AUTOMATED: 0 PER 100 WBC
PDW BLD-RTO: 13.2 % (ref 11.8–14.4)
PLATELET # BLD: 206 K/UL (ref 138–453)
PMV BLD AUTO: 10.6 FL (ref 8.1–13.5)
POTASSIUM SERPL-SCNC: 4 MMOL/L (ref 3.7–5.3)
RBC # BLD: 4.5 M/UL (ref 3.95–5.11)
SODIUM BLD-SCNC: 143 MMOL/L (ref 135–144)
WBC # BLD: 5.9 K/UL (ref 3.5–11.3)

## 2020-06-30 PROCEDURE — 36415 COLL VENOUS BLD VENIPUNCTURE: CPT

## 2020-06-30 PROCEDURE — 93005 ELECTROCARDIOGRAM TRACING: CPT | Performed by: PLASTIC SURGERY

## 2020-06-30 PROCEDURE — 80051 ELECTROLYTE PANEL: CPT

## 2020-06-30 PROCEDURE — 85027 COMPLETE CBC AUTOMATED: CPT

## 2020-07-01 LAB
EKG ATRIAL RATE: 85 BPM
EKG P AXIS: 79 DEGREES
EKG P-R INTERVAL: 134 MS
EKG Q-T INTERVAL: 390 MS
EKG QRS DURATION: 80 MS
EKG QTC CALCULATION (BAZETT): 464 MS
EKG R AXIS: 84 DEGREES
EKG T AXIS: 64 DEGREES
EKG VENTRICULAR RATE: 85 BPM

## 2020-07-01 PROCEDURE — 93010 ELECTROCARDIOGRAM REPORT: CPT | Performed by: INTERNAL MEDICINE

## 2020-07-08 NOTE — PROGRESS NOTES
Preoperative Instructions:Get Covid testing done as scheduled 7-    Stop eating solid foods at midnight the night prior to your surgery. Stop drinking clear liquids at midnight the night prior to your surgery. Arrive at the surgery center (3rd entrance) on __[phone]_____________ by __0700_____Please wear a mask._______. Please stop any blood thinning medications as directed by your surgeon or prescribing physician. Failure to stop certain medications may interfere with your scheduled surgery. These may include: Aspirin, Coumadin, Plavix, NSAIDS (Motrin, Aleve, Advil, Mobic, Celebrex), Eliquis, Pradaxa, Xarelto, Fish oil, and herbal supplements. You may continue the rest of your medications through the night before surgery unless instructed otherwise. Day of surgery please take only the following medication(s) with a small sip of water: Wellbutrin, Protonix      PLEASE shower with antibacterial soap and water the night before and the morning of you surgery. Inspect breast tissue for any rashes, etc. Avoid sunburn to chest tissue. Reminders:  -If you are going home the day of your procedure, you will need a family member or friend to stay during the procedure and drive you home after your procedure. Your  must be 25years of age or older and able to sign off on your discharge instructions.    -If you are going home the same day of your surgery, someone must remain with you for the first 24 hours after your surgery if you receive sedation or anesthesia.      -Please do not wear any jewelery, lotions, contacts or body piercing the day of surgery

## 2020-07-10 ENCOUNTER — ANESTHESIA EVENT (OUTPATIENT)
Dept: OPERATING ROOM | Age: 55
End: 2020-07-10
Payer: COMMERCIAL

## 2020-07-10 RX ORDER — SODIUM CHLORIDE 9 MG/ML
INJECTION, SOLUTION INTRAVENOUS CONTINUOUS
Status: CANCELLED | OUTPATIENT
Start: 2020-07-10

## 2020-07-10 RX ORDER — SODIUM CHLORIDE 0.9 % (FLUSH) 0.9 %
10 SYRINGE (ML) INJECTION EVERY 12 HOURS SCHEDULED
Status: CANCELLED | OUTPATIENT
Start: 2020-07-10

## 2020-07-10 RX ORDER — ESCITALOPRAM OXALATE 10 MG/1
TABLET ORAL
Qty: 45 TABLET | Refills: 1 | Status: SHIPPED | OUTPATIENT
Start: 2020-07-10 | End: 2021-01-06

## 2020-07-10 RX ORDER — SODIUM CHLORIDE 0.9 % (FLUSH) 0.9 %
10 SYRINGE (ML) INJECTION PRN
Status: CANCELLED | OUTPATIENT
Start: 2020-07-10

## 2020-07-10 NOTE — TELEPHONE ENCOUNTER
Encounter for cosmetic surgery     Hypertrophy of breast     Bilateral mastodynia     Chronic bilateral thoracic back pain     Acute cervical myofascial strain     Rash, skin

## 2020-07-12 ENCOUNTER — HOSPITAL ENCOUNTER (OUTPATIENT)
Dept: PREADMISSION TESTING | Age: 55
Setting detail: SPECIMEN
Discharge: HOME | End: 2020-07-16
Payer: COMMERCIAL

## 2020-07-12 PROCEDURE — U0004 COV-19 TEST NON-CDC HGH THRU: HCPCS

## 2020-07-13 LAB
SARS-COV-2, PCR: NOT DETECTED
SARS-COV-2, RAPID: NORMAL
SARS-COV-2: NORMAL
SOURCE: NORMAL

## 2020-07-14 ENCOUNTER — ANESTHESIA (OUTPATIENT)
Dept: OPERATING ROOM | Age: 55
End: 2020-07-14
Payer: COMMERCIAL

## 2020-07-14 ENCOUNTER — HOSPITAL ENCOUNTER (OUTPATIENT)
Age: 55
Setting detail: OUTPATIENT SURGERY
Discharge: HOME | End: 2020-07-14
Attending: PLASTIC SURGERY | Admitting: PLASTIC SURGERY
Payer: COMMERCIAL

## 2020-07-14 VITALS
HEART RATE: 58 BPM | TEMPERATURE: 96.5 F | RESPIRATION RATE: 14 BRPM | OXYGEN SATURATION: 21 % | SYSTOLIC BLOOD PRESSURE: 154 MMHG | HEIGHT: 63 IN | DIASTOLIC BLOOD PRESSURE: 85 MMHG | BODY MASS INDEX: 31.04 KG/M2 | WEIGHT: 175.2 LBS

## 2020-07-14 VITALS — TEMPERATURE: 95.5 F | DIASTOLIC BLOOD PRESSURE: 63 MMHG | OXYGEN SATURATION: 96 % | SYSTOLIC BLOOD PRESSURE: 91 MMHG

## 2020-07-14 PROCEDURE — 2580000003 HC RX 258: Performed by: ANESTHESIOLOGY

## 2020-07-14 PROCEDURE — 6360000002 HC RX W HCPCS: Performed by: ANESTHESIOLOGY

## 2020-07-14 PROCEDURE — 88305 TISSUE EXAM BY PATHOLOGIST: CPT

## 2020-07-14 PROCEDURE — 3700000001 HC ADD 15 MINUTES (ANESTHESIA): Performed by: PLASTIC SURGERY

## 2020-07-14 PROCEDURE — 6370000000 HC RX 637 (ALT 250 FOR IP): Performed by: PLASTIC SURGERY

## 2020-07-14 PROCEDURE — 7100000011 HC PHASE II RECOVERY - ADDTL 15 MIN: Performed by: PLASTIC SURGERY

## 2020-07-14 PROCEDURE — 6370000000 HC RX 637 (ALT 250 FOR IP)

## 2020-07-14 PROCEDURE — L8000 MASTECTOMY BRA: HCPCS | Performed by: PLASTIC SURGERY

## 2020-07-14 PROCEDURE — C1729 CATH, DRAINAGE: HCPCS | Performed by: PLASTIC SURGERY

## 2020-07-14 PROCEDURE — 2580000003 HC RX 258: Performed by: PLASTIC SURGERY

## 2020-07-14 PROCEDURE — 7100000010 HC PHASE II RECOVERY - FIRST 15 MIN: Performed by: PLASTIC SURGERY

## 2020-07-14 PROCEDURE — 2500000003 HC RX 250 WO HCPCS: Performed by: ANESTHESIOLOGY

## 2020-07-14 PROCEDURE — 7100000000 HC PACU RECOVERY - FIRST 15 MIN: Performed by: PLASTIC SURGERY

## 2020-07-14 PROCEDURE — 6360000002 HC RX W HCPCS: Performed by: PLASTIC SURGERY

## 2020-07-14 PROCEDURE — 3600000013 HC SURGERY LEVEL 3 ADDTL 15MIN: Performed by: PLASTIC SURGERY

## 2020-07-14 PROCEDURE — 6360000002 HC RX W HCPCS

## 2020-07-14 PROCEDURE — 7100000001 HC PACU RECOVERY - ADDTL 15 MIN: Performed by: PLASTIC SURGERY

## 2020-07-14 PROCEDURE — 2500000003 HC RX 250 WO HCPCS: Performed by: PLASTIC SURGERY

## 2020-07-14 PROCEDURE — 3700000000 HC ANESTHESIA ATTENDED CARE: Performed by: PLASTIC SURGERY

## 2020-07-14 PROCEDURE — 2709999900 HC NON-CHARGEABLE SUPPLY: Performed by: PLASTIC SURGERY

## 2020-07-14 PROCEDURE — 3600000003 HC SURGERY LEVEL 3 BASE: Performed by: PLASTIC SURGERY

## 2020-07-14 RX ORDER — ONDANSETRON 2 MG/ML
4 INJECTION INTRAMUSCULAR; INTRAVENOUS
Status: DISCONTINUED | OUTPATIENT
Start: 2020-07-14 | End: 2020-07-14 | Stop reason: HOSPADM

## 2020-07-14 RX ORDER — MIDAZOLAM HYDROCHLORIDE 1 MG/ML
INJECTION INTRAMUSCULAR; INTRAVENOUS PRN
Status: DISCONTINUED | OUTPATIENT
Start: 2020-07-14 | End: 2020-07-14 | Stop reason: SDUPTHER

## 2020-07-14 RX ORDER — GLYCOPYRROLATE 1 MG/5 ML
SYRINGE (ML) INTRAVENOUS PRN
Status: DISCONTINUED | OUTPATIENT
Start: 2020-07-14 | End: 2020-07-14 | Stop reason: SDUPTHER

## 2020-07-14 RX ORDER — FENTANYL CITRATE 50 UG/ML
25 INJECTION, SOLUTION INTRAMUSCULAR; INTRAVENOUS EVERY 5 MIN PRN
Status: DISCONTINUED | OUTPATIENT
Start: 2020-07-14 | End: 2020-07-14 | Stop reason: HOSPADM

## 2020-07-14 RX ORDER — CEPHALEXIN 500 MG/1
500 CAPSULE ORAL 3 TIMES DAILY
Qty: 21 CAPSULE | Refills: 0 | Status: SHIPPED | OUTPATIENT
Start: 2020-07-14 | End: 2020-07-21

## 2020-07-14 RX ORDER — SCOLOPAMINE TRANSDERMAL SYSTEM 1 MG/1
PATCH, EXTENDED RELEASE TRANSDERMAL
Status: COMPLETED
Start: 2020-07-14 | End: 2020-07-14

## 2020-07-14 RX ORDER — SODIUM CHLORIDE, SODIUM LACTATE, POTASSIUM CHLORIDE, CALCIUM CHLORIDE 600; 310; 30; 20 MG/100ML; MG/100ML; MG/100ML; MG/100ML
INJECTION, SOLUTION INTRAVENOUS CONTINUOUS
Status: DISCONTINUED | OUTPATIENT
Start: 2020-07-14 | End: 2020-07-14 | Stop reason: HOSPADM

## 2020-07-14 RX ORDER — NEOSTIGMINE METHYLSULFATE 5 MG/5 ML
SYRINGE (ML) INTRAVENOUS PRN
Status: DISCONTINUED | OUTPATIENT
Start: 2020-07-14 | End: 2020-07-14 | Stop reason: SDUPTHER

## 2020-07-14 RX ORDER — LABETALOL 20 MG/4 ML (5 MG/ML) INTRAVENOUS SYRINGE
5 EVERY 10 MIN PRN
Status: DISCONTINUED | OUTPATIENT
Start: 2020-07-14 | End: 2020-07-14 | Stop reason: HOSPADM

## 2020-07-14 RX ORDER — SODIUM CHLORIDE 9 MG/ML
INJECTION INTRAVENOUS
Status: DISCONTINUED
Start: 2020-07-14 | End: 2020-07-14 | Stop reason: HOSPADM

## 2020-07-14 RX ORDER — LIDOCAINE HYDROCHLORIDE 10 MG/ML
INJECTION, SOLUTION EPIDURAL; INFILTRATION; INTRACAUDAL; PERINEURAL PRN
Status: DISCONTINUED | OUTPATIENT
Start: 2020-07-14 | End: 2020-07-14 | Stop reason: SDUPTHER

## 2020-07-14 RX ORDER — KETOROLAC TROMETHAMINE 30 MG/ML
INJECTION, SOLUTION INTRAMUSCULAR; INTRAVENOUS PRN
Status: DISCONTINUED | OUTPATIENT
Start: 2020-07-14 | End: 2020-07-14 | Stop reason: SDUPTHER

## 2020-07-14 RX ORDER — BUPIVACAINE HYDROCHLORIDE AND EPINEPHRINE 2.5; 5 MG/ML; UG/ML
INJECTION, SOLUTION EPIDURAL; INFILTRATION; INTRACAUDAL; PERINEURAL
Status: DISCONTINUED
Start: 2020-07-14 | End: 2020-07-14 | Stop reason: WASHOUT

## 2020-07-14 RX ORDER — HYDROCODONE BITARTRATE AND ACETAMINOPHEN 5; 325 MG/1; MG/1
1 TABLET ORAL EVERY 6 HOURS PRN
Qty: 28 TABLET | Refills: 0 | Status: SHIPPED | OUTPATIENT
Start: 2020-07-14 | End: 2020-07-21

## 2020-07-14 RX ORDER — SODIUM CHLORIDE, SODIUM LACTATE, POTASSIUM CHLORIDE, CALCIUM CHLORIDE 600; 310; 30; 20 MG/100ML; MG/100ML; MG/100ML; MG/100ML
INJECTION, SOLUTION INTRAVENOUS CONTINUOUS PRN
Status: DISCONTINUED | OUTPATIENT
Start: 2020-07-14 | End: 2020-07-14 | Stop reason: SDUPTHER

## 2020-07-14 RX ORDER — ONDANSETRON 2 MG/ML
INJECTION INTRAMUSCULAR; INTRAVENOUS
Status: DISCONTINUED
Start: 2020-07-14 | End: 2020-07-14 | Stop reason: HOSPADM

## 2020-07-14 RX ORDER — APREPITANT 40 MG/1
CAPSULE ORAL
Status: COMPLETED
Start: 2020-07-14 | End: 2020-07-14

## 2020-07-14 RX ORDER — DIPHENHYDRAMINE HYDROCHLORIDE 50 MG/ML
12.5 INJECTION INTRAMUSCULAR; INTRAVENOUS
Status: DISCONTINUED | OUTPATIENT
Start: 2020-07-14 | End: 2020-07-14 | Stop reason: HOSPADM

## 2020-07-14 RX ORDER — PROMETHAZINE HYDROCHLORIDE 25 MG/ML
6.25 INJECTION, SOLUTION INTRAMUSCULAR; INTRAVENOUS
Status: DISCONTINUED | OUTPATIENT
Start: 2020-07-14 | End: 2020-07-14 | Stop reason: HOSPADM

## 2020-07-14 RX ORDER — BUPIVACAINE HYDROCHLORIDE 2.5 MG/ML
INJECTION, SOLUTION INFILTRATION; PERINEURAL
Status: DISCONTINUED
Start: 2020-07-14 | End: 2020-07-14 | Stop reason: HOSPADM

## 2020-07-14 RX ORDER — MAGNESIUM HYDROXIDE 1200 MG/15ML
LIQUID ORAL CONTINUOUS PRN
Status: COMPLETED | OUTPATIENT
Start: 2020-07-14 | End: 2020-07-14

## 2020-07-14 RX ORDER — CEFAZOLIN SODIUM 1 G/3ML
INJECTION, POWDER, FOR SOLUTION INTRAMUSCULAR; INTRAVENOUS
Status: COMPLETED
Start: 2020-07-14 | End: 2020-07-14

## 2020-07-14 RX ORDER — PROMETHAZINE HYDROCHLORIDE 25 MG/ML
12.5 INJECTION, SOLUTION INTRAMUSCULAR; INTRAVENOUS
Status: DISCONTINUED | OUTPATIENT
Start: 2020-07-14 | End: 2020-07-14 | Stop reason: HOSPADM

## 2020-07-14 RX ORDER — ROCURONIUM BROMIDE 10 MG/ML
INJECTION, SOLUTION INTRAVENOUS PRN
Status: DISCONTINUED | OUTPATIENT
Start: 2020-07-14 | End: 2020-07-14 | Stop reason: SDUPTHER

## 2020-07-14 RX ORDER — BUPIVACAINE HYDROCHLORIDE 2.5 MG/ML
INJECTION, SOLUTION EPIDURAL; INFILTRATION; INTRACAUDAL
Status: DISCONTINUED
Start: 2020-07-14 | End: 2020-07-14 | Stop reason: WASHOUT

## 2020-07-14 RX ORDER — DEXAMETHASONE SODIUM PHOSPHATE 10 MG/ML
INJECTION, SOLUTION INTRAMUSCULAR; INTRAVENOUS PRN
Status: DISCONTINUED | OUTPATIENT
Start: 2020-07-14 | End: 2020-07-14 | Stop reason: SDUPTHER

## 2020-07-14 RX ORDER — FENTANYL CITRATE 50 UG/ML
INJECTION, SOLUTION INTRAMUSCULAR; INTRAVENOUS PRN
Status: DISCONTINUED | OUTPATIENT
Start: 2020-07-14 | End: 2020-07-14 | Stop reason: SDUPTHER

## 2020-07-14 RX ORDER — CEFAZOLIN SODIUM 1 G/3ML
INJECTION, POWDER, FOR SOLUTION INTRAMUSCULAR; INTRAVENOUS PRN
Status: DISCONTINUED | OUTPATIENT
Start: 2020-07-14 | End: 2020-07-14 | Stop reason: SDUPTHER

## 2020-07-14 RX ORDER — 0.9 % SODIUM CHLORIDE 0.9 %
500 INTRAVENOUS SOLUTION INTRAVENOUS
Status: DISCONTINUED | OUTPATIENT
Start: 2020-07-14 | End: 2020-07-14 | Stop reason: HOSPADM

## 2020-07-14 RX ORDER — HYDRALAZINE HYDROCHLORIDE 20 MG/ML
5 INJECTION INTRAMUSCULAR; INTRAVENOUS EVERY 10 MIN PRN
Status: DISCONTINUED | OUTPATIENT
Start: 2020-07-14 | End: 2020-07-14 | Stop reason: HOSPADM

## 2020-07-14 RX ORDER — FENTANYL CITRATE 50 UG/ML
INJECTION, SOLUTION INTRAMUSCULAR; INTRAVENOUS
Status: COMPLETED
Start: 2020-07-14 | End: 2020-07-14

## 2020-07-14 RX ORDER — ONDANSETRON 2 MG/ML
INJECTION INTRAMUSCULAR; INTRAVENOUS PRN
Status: DISCONTINUED | OUTPATIENT
Start: 2020-07-14 | End: 2020-07-14 | Stop reason: SDUPTHER

## 2020-07-14 RX ORDER — PROPOFOL 10 MG/ML
INJECTION, EMULSION INTRAVENOUS PRN
Status: DISCONTINUED | OUTPATIENT
Start: 2020-07-14 | End: 2020-07-14 | Stop reason: SDUPTHER

## 2020-07-14 RX ADMIN — PROPOFOL 200 MG: 10 INJECTION, EMULSION INTRAVENOUS at 08:51

## 2020-07-14 RX ADMIN — Medication 2 MG: at 12:04

## 2020-07-14 RX ADMIN — PHENYLEPHRINE HYDROCHLORIDE 200 MCG: 10 INJECTION INTRAVENOUS at 09:12

## 2020-07-14 RX ADMIN — DEXAMETHASONE SODIUM PHOSPHATE 8 MG: 10 INJECTION, SOLUTION INTRAMUSCULAR; INTRAVENOUS at 08:59

## 2020-07-14 RX ADMIN — CEFAZOLIN 2000 MG: 1 INJECTION, POWDER, FOR SOLUTION INTRAMUSCULAR; INTRAVENOUS at 08:45

## 2020-07-14 RX ADMIN — FENTANYL CITRATE 50 MCG: 50 INJECTION, SOLUTION INTRAMUSCULAR; INTRAVENOUS at 12:41

## 2020-07-14 RX ADMIN — SODIUM CHLORIDE, POTASSIUM CHLORIDE, SODIUM LACTATE AND CALCIUM CHLORIDE: 600; 310; 30; 20 INJECTION, SOLUTION INTRAVENOUS at 08:45

## 2020-07-14 RX ADMIN — FENTANYL CITRATE 100 MCG: 50 INJECTION, SOLUTION INTRAMUSCULAR; INTRAVENOUS at 08:51

## 2020-07-14 RX ADMIN — MIDAZOLAM HYDROCHLORIDE 2 MG: 1 INJECTION, SOLUTION INTRAMUSCULAR; INTRAVENOUS at 08:45

## 2020-07-14 RX ADMIN — APREPITANT 40 MG: 40 CAPSULE ORAL at 07:39

## 2020-07-14 RX ADMIN — Medication 0.2 MG: at 09:12

## 2020-07-14 RX ADMIN — FENTANYL CITRATE 100 MCG: 50 INJECTION, SOLUTION INTRAMUSCULAR; INTRAVENOUS at 08:45

## 2020-07-14 RX ADMIN — ROCURONIUM BROMIDE 10 MG: 10 INJECTION INTRAVENOUS at 09:43

## 2020-07-14 RX ADMIN — Medication 0.4 MG: at 12:04

## 2020-07-14 RX ADMIN — ROCURONIUM BROMIDE 50 MG: 10 INJECTION INTRAVENOUS at 08:51

## 2020-07-14 RX ADMIN — ROCURONIUM BROMIDE 10 MG: 10 INJECTION INTRAVENOUS at 10:31

## 2020-07-14 RX ADMIN — SODIUM CHLORIDE, POTASSIUM CHLORIDE, SODIUM LACTATE AND CALCIUM CHLORIDE: 600; 310; 30; 20 INJECTION, SOLUTION INTRAVENOUS at 12:45

## 2020-07-14 RX ADMIN — FENTANYL CITRATE 50 MCG: 50 INJECTION, SOLUTION INTRAMUSCULAR; INTRAVENOUS at 13:07

## 2020-07-14 RX ADMIN — FENTANYL CITRATE 50 MCG: 50 INJECTION, SOLUTION INTRAMUSCULAR; INTRAVENOUS at 12:06

## 2020-07-14 RX ADMIN — ONDANSETRON 4 MG: 2 INJECTION, SOLUTION INTRAMUSCULAR; INTRAVENOUS at 12:00

## 2020-07-14 RX ADMIN — PHENYLEPHRINE HYDROCHLORIDE 200 MCG: 10 INJECTION INTRAVENOUS at 11:27

## 2020-07-14 RX ADMIN — LIDOCAINE HYDROCHLORIDE 50 MG: 10 INJECTION, SOLUTION EPIDURAL; INFILTRATION; INTRACAUDAL; PERINEURAL at 08:51

## 2020-07-14 RX ADMIN — PHENYLEPHRINE HYDROCHLORIDE 200 MCG: 10 INJECTION INTRAVENOUS at 10:53

## 2020-07-14 RX ADMIN — KETOROLAC TROMETHAMINE 30 MG: 30 INJECTION, SOLUTION INTRAMUSCULAR; INTRAVENOUS at 12:04

## 2020-07-14 ASSESSMENT — PULMONARY FUNCTION TESTS
PIF_VALUE: 21
PIF_VALUE: 21
PIF_VALUE: 24
PIF_VALUE: 22
PIF_VALUE: 21
PIF_VALUE: 22
PIF_VALUE: 23
PIF_VALUE: 0
PIF_VALUE: 22
PIF_VALUE: 2
PIF_VALUE: 21
PIF_VALUE: 23
PIF_VALUE: 23
PIF_VALUE: 21
PIF_VALUE: 22
PIF_VALUE: 25
PIF_VALUE: 23
PIF_VALUE: 1
PIF_VALUE: 1
PIF_VALUE: 25
PIF_VALUE: 21
PIF_VALUE: 22
PIF_VALUE: 21
PIF_VALUE: 23
PIF_VALUE: 22
PIF_VALUE: 2
PIF_VALUE: 23
PIF_VALUE: 24
PIF_VALUE: 21
PIF_VALUE: 22
PIF_VALUE: 21
PIF_VALUE: 22
PIF_VALUE: 22
PIF_VALUE: 1
PIF_VALUE: 23
PIF_VALUE: 22
PIF_VALUE: 25
PIF_VALUE: 23
PIF_VALUE: 23
PIF_VALUE: 1
PIF_VALUE: 21
PIF_VALUE: 21
PIF_VALUE: 1
PIF_VALUE: 8
PIF_VALUE: 21
PIF_VALUE: 22
PIF_VALUE: 22
PIF_VALUE: 3
PIF_VALUE: 24
PIF_VALUE: 21
PIF_VALUE: 21
PIF_VALUE: 25
PIF_VALUE: 21
PIF_VALUE: 21
PIF_VALUE: 2
PIF_VALUE: 21
PIF_VALUE: 21
PIF_VALUE: 27
PIF_VALUE: 22
PIF_VALUE: 22
PIF_VALUE: 25
PIF_VALUE: 24
PIF_VALUE: 23
PIF_VALUE: 22
PIF_VALUE: 22
PIF_VALUE: 24
PIF_VALUE: 24
PIF_VALUE: 21
PIF_VALUE: 29
PIF_VALUE: 3
PIF_VALUE: 21
PIF_VALUE: 4
PIF_VALUE: 23
PIF_VALUE: 23
PIF_VALUE: 21
PIF_VALUE: 23
PIF_VALUE: 23
PIF_VALUE: 22
PIF_VALUE: 22
PIF_VALUE: 23
PIF_VALUE: 22
PIF_VALUE: 22
PIF_VALUE: 23
PIF_VALUE: 21
PIF_VALUE: 4
PIF_VALUE: 22
PIF_VALUE: 22
PIF_VALUE: 23
PIF_VALUE: 21
PIF_VALUE: 24
PIF_VALUE: 0
PIF_VALUE: 21
PIF_VALUE: 22
PIF_VALUE: 22
PIF_VALUE: 21
PIF_VALUE: 22
PIF_VALUE: 22
PIF_VALUE: 21
PIF_VALUE: 23
PIF_VALUE: 22
PIF_VALUE: 24
PIF_VALUE: 21
PIF_VALUE: 23
PIF_VALUE: 21
PIF_VALUE: 22
PIF_VALUE: 22
PIF_VALUE: 21
PIF_VALUE: 23
PIF_VALUE: 21
PIF_VALUE: 2
PIF_VALUE: 22
PIF_VALUE: 21
PIF_VALUE: 22
PIF_VALUE: 21
PIF_VALUE: 22
PIF_VALUE: 21
PIF_VALUE: 22
PIF_VALUE: 21
PIF_VALUE: 24
PIF_VALUE: 3
PIF_VALUE: 21
PIF_VALUE: 2
PIF_VALUE: 20
PIF_VALUE: 23
PIF_VALUE: 21
PIF_VALUE: 23
PIF_VALUE: 21
PIF_VALUE: 23
PIF_VALUE: 21
PIF_VALUE: 22
PIF_VALUE: 30
PIF_VALUE: 21
PIF_VALUE: 23
PIF_VALUE: 4
PIF_VALUE: 22
PIF_VALUE: 24
PIF_VALUE: 21
PIF_VALUE: 22
PIF_VALUE: 21
PIF_VALUE: 21
PIF_VALUE: 23
PIF_VALUE: 21
PIF_VALUE: 1
PIF_VALUE: 23
PIF_VALUE: 5
PIF_VALUE: 22
PIF_VALUE: 20
PIF_VALUE: 22
PIF_VALUE: 24
PIF_VALUE: 23
PIF_VALUE: 0
PIF_VALUE: 22
PIF_VALUE: 21
PIF_VALUE: 22
PIF_VALUE: 22
PIF_VALUE: 21
PIF_VALUE: 21
PIF_VALUE: 23
PIF_VALUE: 22
PIF_VALUE: 22
PIF_VALUE: 23
PIF_VALUE: 22
PIF_VALUE: 22
PIF_VALUE: 21
PIF_VALUE: 22
PIF_VALUE: 21
PIF_VALUE: 1
PIF_VALUE: 23
PIF_VALUE: 22
PIF_VALUE: 25
PIF_VALUE: 21
PIF_VALUE: 21
PIF_VALUE: 28
PIF_VALUE: 22
PIF_VALUE: 23
PIF_VALUE: 23
PIF_VALUE: 22
PIF_VALUE: 24
PIF_VALUE: 23
PIF_VALUE: 25
PIF_VALUE: 22
PIF_VALUE: 21
PIF_VALUE: 21
PIF_VALUE: 22
PIF_VALUE: 22
PIF_VALUE: 23
PIF_VALUE: 22
PIF_VALUE: 22
PIF_VALUE: 23
PIF_VALUE: 24
PIF_VALUE: 23
PIF_VALUE: 24
PIF_VALUE: 0
PIF_VALUE: 21
PIF_VALUE: 21
PIF_VALUE: 22
PIF_VALUE: 24
PIF_VALUE: 21
PIF_VALUE: 24
PIF_VALUE: 24

## 2020-07-14 ASSESSMENT — PAIN - FUNCTIONAL ASSESSMENT: PAIN_FUNCTIONAL_ASSESSMENT: 0-10

## 2020-07-14 ASSESSMENT — PAIN DESCRIPTION - LOCATION: LOCATION: BREAST

## 2020-07-14 ASSESSMENT — PAIN SCALES - GENERAL
PAINLEVEL_OUTOF10: 7
PAINLEVEL_OUTOF10: 3

## 2020-07-14 ASSESSMENT — PAIN DESCRIPTION - PAIN TYPE: TYPE: SURGICAL PAIN

## 2020-07-14 NOTE — H&P
Office Note     ROLANDO Shelton MD, FACS     Subjective:      Patient ID: Madison Wu is a 48 y.o. female.     HPI  Patient comes in today for evaluation for bilateral symptomatic macromastia. She is currently a 40 triple D and complains of back pain, shoulder pain, shoulder grooving, neck pain, intrinsic back pain, difficulty breathing especially when she lays down, and rashes under both breasts. She is tried losing weight as well as conservative measures such as creams and powders but has failed to improve her symptoms. Patient has also been using nonsteroidal anti-inflammatories for over 2 years such as 500 mg of Tamela back and body as well as other nonsteroidals without any relief from her back pain due to her pendulous breasts. In addition she has been using various supportive bras for over 20 years without any symptomatic relief. Her back and shoulders hurt all the time due to the weight and pendulous nature of her breasts with shoulder grooving and intrinsic breast, back and neck pain.   Review of Systems     Past Medical History        Past Medical History:   Diagnosis Date    ASCUS (atypical squamous cells of undetermined significance) on Pap smear 2006    Depression      Endometriosis       Found on scope 8/22/12    GERD (gastroesophageal reflux disease)      HTN (hypertension)      Hyperlipidemia      Iron deficiency anemia      Menometrorrhagia      Sinus problem          Past Surgical History         Past Surgical History:   Procedure Laterality Date    COLONOSCOPY   2004     wnl    ENDOMETRIAL BIOPSY   5/18/2012     due to menorrhagia    LAPAROSCOPY   8/22/12     Dx-->Operative, ablation of endometriosis, drainage of Rt Ovarian cyst, pelvic lavage    LEEP   2006     ecc    LIPOSUCTION   10/18/2018     abdomen , flanks, lower back    [place] OFFICE/OUTPT VISIT,PROCEDURE ONLY N/A 10/18/2018     COSMETIC - LIPOSUCTION ABDOMEN, FLANKS, LOWER BACK SUCTION performed by Aniyah Lopez MD Brandi at [address] Bilateral 6/6/2001     cheeks---intradermal nevus    UPPER GASTROINTESTINAL ENDOSCOPY        UPPER GASTROINTESTINAL ENDOSCOPY   2004     gastritis              Allergies   Allergen Reactions    Codeine Hives, Nausea And Vomiting and Nausea Only    Oxycodone-Acetaminophen Hives       bilat eyes; no rashes    Percocet [Oxycodone-Acetaminophen] Hives       bilat eyes; no rashes    Simvastatin Rash     Current Facility-Administered Medications          Current Outpatient Medications   Medication Sig Dispense Refill    hydrochlorothiazide (HYDRODIURIL) 25 MG tablet TAKE ONE TABLET BY MOUTH DAILY 90 tablet 1    pantoprazole (PROTONIX) 40 MG tablet TAKE ONE TABLET BY MOUTH DAILY 30 tablet 5    LORazepam (ATIVAN) 0.5 MG tablet TAKE ONE TABLET BY MOUTH DAILY AS NEEDED FOR ANXIETY FOR UP TO 60 DAYS 30 tablet 1    escitalopram (LEXAPRO) 10 MG tablet TAKE ONE-HALF TABLET BY MOUTH DAILY 90 tablet 1    buPROPion (WELLBUTRIN XL) 150 MG extended release tablet TAKE ONE TABLET BY MOUTH DAILY 90 tablet 1      No current facility-administered medications for this visit. Social History               Socioeconomic History    Marital status:        Spouse name: Not on file    Number of children: Not on file    Years of education: Not on file    Highest education level: Not on file   Occupational History    Not on file   Social Needs    Financial resource strain: Not on file    Food insecurity:       Worry: Not on file       Inability: Not on file    Transportation needs:       Medical: Not on file       Non-medical: Not on file   Tobacco Use    Smoking status: Never Smoker    Smokeless tobacco: Never Used   Substance and Sexual Activity    Alcohol use:  Yes       Comment: social    Drug use: No    Sexual activity: Not on file   Lifestyle    Physical activity:       Days per week: Not on file       Minutes per session: Not on file    Stress: Not on file   Relationships    Social connections:       Talks on phone: Not on file       Gets together: Not on file       Attends [employer] service: Not on file       Active member of club or organization: Not on file       Attends meetings of clubs or organizations: Not on file       Relationship status: Not on file    Intimate partner violence:       Fear of current or ex partner: Not on file       Emotionally abused: Not on file       Physically abused: Not on file       Forced sexual activity: Not on file   Other Topics Concern    Not on file   Social History Narrative    Not on file        Family History         Family History   Problem Relation Age of Onset    Uterine Cancer Paternal Grandmother      Colon Cancer Paternal Grandmother      Heart Disease Father      Cancer Maternal Grandmother           bone and breast    Heart Disease Maternal Grandfather          Review of systems is otherwise negative. Ht 5' 3\" (1.6 m) Comment: stated  Wt 155 lb (70.3 kg) Comment: stated  LMP 07/29/2015 (Approximate)   BMI 27.46 kg/m²         Objective:   Physical Exam   Constitutional: She is oriented to person, place, and time. She appears well-developed and well-nourished. HENT:   Head: Normocephalic and atraumatic. Nose: Nose normal.   Eyes: Pupils are equal, round, and reactive to light. Conjunctivae and EOM are normal.   Neck: Normal range of motion. No JVD present. No tracheal deviation present. Cardiovascular: Normal rate and intact distal pulses. Pulmonary/Chest: Effort normal. No respiratory distress. She has no wheezes. Bilateral pendulous breasts with the left being slightly larger and hanging a bit lower than the right. Abdominal: Soft. Normal appearance. She exhibits no distension. There is no hepatosplenomegaly. Musculoskeletal: Normal range of motion. She exhibits no edema or tenderness. Lymphadenopathy:     She has no cervical adenopathy.    Neurological: She is alert and oriented to person, place, and time. No cranial nerve deficit. Skin: Skin is warm, dry and intact. No rash noted. She is not diaphoretic. No cyanosis or erythema. No pallor. Nails show no clubbing. Psychiatric: She has a normal mood and affect. Her speech is normal and behavior is normal. Judgment and thought content normal.   Nursing note and vitals reviewed.     Her left sternal to nipple measurement is 32 cm, the right is 29.5 cm  Nipple to nipple: 22.5 cm  Left inframammary fold: 15 cm  Right inframammary fold: 16.5 cm  Left breast hangs a bit lower and is more pendulous than the right breast.     Assessment:      Bilateral symptomatic macromastia                Plan:      I do feel the patient is a good candidate for Bilateral Breast Reduction. She has very large pendulous breast that are symptomatic. I do feel I can remove in excess of 600 grams from each breast.      The procedure was discussed with the patient at great length. She understands the incision placement as well as the expected scars and outcome.       Pt understands that this is not a cosmetic but rather a reconstructive procedure in an attmept to help alleviate her symptoms of bilateral macromastia. She also understands the risks of surgery include but are not limited to infection, bleeding, scar formation, scar thickening or widening, keloid formation, skin flap loss, wound breakdown, nipple/areolar necrosis, sensory changes with numbness or hypersensitivity, asymmetry, failure to resolve her symptoms and need for reoperation.        The ICD-10 codes are:   N62, N64.4, S16. Eric Del Rosario  The proposed CPT code is 00262-11 for a bilateral breast reduction.     Photos were taken in the office today and enclosed for your review.                   The patient was evaluated and examined with my nurse in the room at all times.   Portions of this note were transcribed using Dragon voice recognition technology and as such may reflect some variations in voice recognition.     A Dilip Price MD

## 2020-07-14 NOTE — PROGRESS NOTES
Medicated for pain,then subsequent nausea.  at bedside conversing with pt. O2 back on due to SaO2 dropping. Pain is a \"5\" Under the breasts\". Saltine crackers given. Ector,pharmacy tech,in to give pt her filled Rx.

## 2020-07-14 NOTE — ANESTHESIA PRE PROCEDURE
Department of Anesthesiology  Preprocedure Note       Name:  Lula Lubin   Age:  48 y.o.  :  1965                                          MRN:  [id number]         Date:  2020      Surgeon: Sol Borges): Suzan Godoy MD    Procedure: Procedure(s):  BREAST MAMMOPLASTY REDUCTION    Medications prior to admission:   Prior to Admission medications    Medication Sig Start Date End Date Taking? Authorizing Provider   escitalopram (LEXAPRO) 10 MG tablet TAKE ONE-HALF TABLET BY MOUTH DAILY 7/10/20  Yes LUPE Oquendo CNP   hydroCHLOROthiazide (HYDRODIURIL) 25 MG tablet TAKE ONE TABLET BY MOUTH DAILY 20  Yes LUPE Oquendo CNP   LORazepam (ATIVAN) 0.5 MG tablet TAKE ONE TABLET BY MOUTH DAILY AS NEEDED FOR ANXIETY FOR UP TO 60 DAYS 20 Yes LUPE Oquendo CNP   pantoprazole (PROTONIX) 40 MG tablet TAKE ONE TABLET BY MOUTH DAILY 3/16/20  Yes LUPE Oquendo CNP   buPROPion (WELLBUTRIN XL) 150 MG extended release tablet TAKE ONE TABLET BY MOUTH DAILY 19  Yes LUPE Oquendo CNP       Current medications:    Current Facility-Administered Medications   Medication Dose Route Frequency Provider Last Rate Last Dose    bupivacaine-EPINEPHrine PF (MARCAINE-w/EPINEPHrine) 0.25% -[id number] injection             bupivacaine (PF) (MARCAINE) 0.25 % injection             ceFAZolin (ANCEF) 1 g injection             gentian violet 1 % topical solution             EPINEPHrine 1 MG/ML injection                Allergies:     Allergies   Allergen Reactions    Codeine Hives, Nausea And Vomiting and Nausea Only    Oxycodone-Acetaminophen Hives     bilat eyes; no rashes    Percocet [Oxycodone-Acetaminophen] Hives     bilat eyes; no rashes    Simvastatin Rash       Problem List:    Patient Active Problem List   Diagnosis Code    Anxiety F41.9    Depression F32.9    GERD (gastroesophageal reflux disease) K21.9    Hyperlipidemia E78.5    Anemia D64.9    Endometriosis N80.9    ASCUS (atypical squamous cells of undetermined significance) on Pap smear R89.6    Neoplasm of uncertain behavior of skin D48.5    Essential hypertension I10    Encounter for cosmetic surgery Z41.1    Hypertrophy of breast N62    Bilateral 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 1XXA    Rash, skin R21       Past Medical History:        Diagnosis Date    Anxiety     ASCUS (atypical squamous cells of undetermined significance) on Pap smear 2006    Depression     Endometriosis     Found on scope 8/22/12    GERD (gastroesophageal reflux disease)     HTN (hypertension)     Hyperlipidemia     Iron deficiency anemia     Menometrorrhagia     PONV (postoperative nausea and vomiting)     severe after liposuction surgery    Sinus problem        Past Surgical History:        Procedure Laterality Date    COLONOSCOPY  2004    wnl    ENDOMETRIAL BIOPSY  5/18/2012    due to menorrhagia    LAPAROSCOPY  8/22/12    Dx-->Operative, ablation of endometriosis, drainage of Rt Ovarian cyst, pelvic lavage    LEEP  2006    ecc    LIPOSUCTION  10/18/2018    abdomen , flanks, lower back    [place] OFFICE/OUTPT VISIT,PROCEDURE ONLY N/A 10/18/2018    COSMETIC - LIPOSUCTION ABDOMEN, FLANKS, LOWER BACK SUCTION performed by Rich Wallace MD at [address] Bilateral 6/6/2001    cheeks---intradermal nevus    UPPER GASTROINTESTINAL ENDOSCOPY      UPPER GASTROINTESTINAL ENDOSCOPY  2004    gastritis       Social History:    Social History     Tobacco Use    Smoking status: Never Smoker    Smokeless tobacco: Never Used   Substance Use Topics    Alcohol use: Yes     Comment: social                                Counseling given: Not Answered      Vital Signs (Current):   Vitals:    07/08/20 1426 07/14/20 0712   BP:  (!) 140/89   Pulse:  82   Resp:  18   Temp:  98.8 °F (37.1 °C)   TempSrc: Temporal   SpO2:  98%   Weight: 150 lb (68 kg) 175 lb 3.2 oz (79.5 kg)   Height: 5' 3\" (1.6 m) 5' 3\" (1.6 m)                                              BP Readings from Last 3 Encounters:   07/14/20 (!) 140/89   02/03/20 (!) 135/99   07/22/19 122/80       NPO Status: Time of last liquid consumption: 0615                        Time of last solid consumption: 2200                        Date of last liquid consumption: 07/14/20                        Date of last solid food consumption: 07/13/20    BMI:   Wt Readings from Last 3 Encounters:   07/14/20 175 lb 3.2 oz (79.5 kg)   02/03/20 170 lb (77.1 kg)   10/21/19 155 lb (70.3 kg)     Body mass index is 31.04 kg/m². CBC:   Lab Results   Component Value Date    WBC 5.9 06/30/2020    RBC 4.50 06/30/2020    RBC 3.70 05/11/2012    HGB 12.9 06/30/2020    HCT 40.1 06/30/2020    MCV 89.1 06/30/2020    RDW 13.2 06/30/2020     06/30/2020     05/11/2012       CMP:   Lab Results   Component Value Date     06/30/2020    K 4.0 06/30/2020     06/30/2020    CO2 27 06/30/2020    BUN 17 10/03/2018    CREATININE 0.63 10/03/2018    GFRAA >60 10/03/2018    LABGLOM >60 10/03/2018    GLUCOSE 99 10/03/2018    GLUCOSE 91 05/11/2012    PROT 7.9 05/22/2015    CALCIUM 9.4 03/06/2017    BILITOT 0.41 05/22/2015    ALKPHOS 45 05/22/2015    AST 15 05/22/2015    ALT 11 05/22/2015       POC Tests: No results for input(s): POCGLU, POCNA, POCK, POCCL, POCBUN, POCHEMO, POCHCT in the last 72 hours.     Coags:   Lab Results   Component Value Date    PROTIME 10.6 05/11/2012    INR 1.0 05/11/2012    APTT 27.0 05/11/2012       HCG (If Applicable):   Lab Results   Component Value Date    PREGTESTUR NEGATIVE 08/13/2012    HCG NEGATIVE 08/22/2012    HCGQUANT <2 05/11/2012        ABGs: No results found for: PHART, PO2ART, HPG0SFM, ITM4LPR, BEART, B1XENPQL     Type & Screen (If Applicable):  No results found for: LABABO, LABRH    Drug/Infectious Status (If Applicable):  No results found for: HIV, HEPCAB    COVID-19 Screening (If Applicable):   Lab Results   Component Value Date    COVID19 Not Detected 07/12/2020         Anesthesia Evaluation  Patient summary reviewed and Nursing notes reviewed   history of anesthetic complications: PONV. Airway: Mallampati: II  TM distance: >3 FB   Neck ROM: full  Mouth opening: > = 3 FB Dental:          Pulmonary:Negative Pulmonary ROS and normal exam  breath sounds clear to auscultation                             Cardiovascular:  Exercise tolerance: good (>4 METS),   (+) hypertension: no interval change, hyperlipidemia      ECG reviewed  Rhythm: regular  Rate: normal           Beta Blocker:  Not on Beta Blocker         Neuro/Psych:   (+) neuromuscular disease:, psychiatric history: stable with treatmentdepression/anxiety             GI/Hepatic/Renal:   (+) GERD: no interval change,           Endo/Other: Negative Endo/Other ROS                    Abdominal:           Vascular: negative vascular ROS. Anesthesia Plan      general     ASA 2       Induction: intravenous. MIPS: Postoperative opioids intended and Prophylactic antiemetics administered. Anesthetic plan and risks discussed with patient. Use of blood products discussed with patient whom consented to blood products. Plan discussed with CRNA.                   George Valente MD   7/14/2020

## 2020-07-14 NOTE — ANESTHESIA POSTPROCEDURE EVALUATION
Department of Anesthesiology  Postprocedure Note    Patient: Tay Webb  MRN: [id number]  YOB: 1965  Date of evaluation: 7/14/2020  Time:  1:08 PM     Procedure Summary     Date:  07/14/20 Room / Location:  [address] [place] [address] 04 / [facility]    Anesthesia Start:  0845 Anesthesia Stop:  1226    Procedure:  BREAST MAMMOPLASTY REDUCTION (Bilateral ) Diagnosis:  (MACROMASTIA - BILATERAL)    Surgeon:  Александр Strange MD Responsible Provider:  Jose David Galvan MD    Anesthesia Type:  general ASA Status:  2          Anesthesia Type: general    Han Phase I: Han Score: 8    Han Phase II:      Last vitals: Reviewed and per EMR flowsheets.        Anesthesia Post Evaluation    Patient location during evaluation: PACU  Patient participation: complete - patient participated  Level of consciousness: awake and alert  Airway patency: patent  Nausea & Vomiting: no nausea and no vomiting  Complications: no  Cardiovascular status: hemodynamically stable  Respiratory status: face mask  Hydration status: euvolemic

## 2020-07-14 NOTE — BRIEF OP NOTE
Brief Postoperative Note      Patient: Demetri Pack  YOB: 1965  MRN: [id number]    Date of Procedure: 7/14/2020    Pre-Op Diagnosis: Symptomatic MACROMASTIA - BILATERAL    Post-Op Diagnosis: Same       Procedure(s):  BREAST MAMMOPLASTY REDUCTION  Right: 669 gm  Left: 804 gm  Liposuction of subaxillary tail of breast bilateral.  Total of 300ml fatty aspirate. Surgeon(s): Juanito Willoughby MD    Assistant:  * No surgical staff found *    Anesthesia: General    Estimated Blood Loss (mL): less than 50     Complications: None    Specimens:   ID Type Source Tests Collected by Time Destination   A : RIGHT BREAST TISSUE  Tissue Breast SURGICAL PATHOLOGY A Angle Powers MD 7/14/2020 0935    B : LEFT BREAST TISSUE  Tissue Breast SURGICAL PATHOLOGY A Angle Powers MD 7/14/2020 1017        Implants:  * No implants in log *      Drains:   Closed/Suction Drain Lateral;Left LUQ;Breast Bulb (Active)       Closed/Suction Drain Lateral;Right Breast;RUQ Bulb (Active)       Findings: Nipple areolar complex pink with good capillary refill bilaterally. Skin flaps pink and viable.     Electronically signed by Juanito Willoughby MD on 7/14/2020 at 12:12 PM

## 2020-07-16 LAB — SURGICAL PATHOLOGY REPORT: NORMAL

## 2020-08-06 NOTE — TELEPHONE ENCOUNTER
Last visit: 5/6/20  Last Med refill: 7/22/19  Does patient have enough medication for 72 hours: No:     Next Visit Date:  Future Appointments   Date Time Provider Tania Yolis   11/9/2020  1:00 PM A Cheryle Median, MD [facility] None       Health Maintenance   Topic Date Due    HIV screen  12/13/1982    DTaP/Tdap/Td vaccine (1 - Tdap) 12/13/1986    Shingles Vaccine (1 of 2) 12/13/2017    Colon cancer screen colonoscopy  12/13/2017    Cervical cancer screen  08/21/2018    Creatinine monitoring  10/03/2019    Flu vaccine (1) 09/01/2020    Potassium monitoring  06/30/2021    Breast cancer screen  11/11/2021    Lipid screen  12/14/2021    Hepatitis A vaccine  Aged Out    Hepatitis B vaccine  Aged Out    Hib vaccine  Aged Out    Meningococcal (ACWY) vaccine  Aged Out    Pneumococcal 0-64 years Vaccine  Aged Out       Hemoglobin A1C (%)   Date Value   05/22/2015 5.0             ( goal A1C is < 7)   Microalb/Crt.  Ratio (mcg/mg creat)   Date Value   05/22/2015 19     LDL Cholesterol (mg/dL)   Date Value   05/22/2015 135 (H)   06/20/2014 147 (H)     LDL Calculated (mg/dL)   Date Value   12/14/2016 138       (goal LDL is <100)   AST (U/L)   Date Value   05/22/2015 15     ALT (U/L)   Date Value   05/22/2015 11     BUN (mg/dL)   Date Value   10/03/2018 17     BP Readings from Last 3 Encounters:   07/14/20 91/63   07/14/20 (!) 154/85   02/03/20 (!) 135/99          (goal 120/80)    All Future Testing planned in [facility]  Lab Frequency Next Occurrence   CBC Once 08/30/2020   Comprehensive Metabolic Panel Once 74/00/5120               Patient Active Problem List:     Anxiety     Depression     GERD (gastroesophageal reflux disease)     Hyperlipidemia     Anemia     Endometriosis     ASCUS (atypical squamous cells of undetermined significance) on Pap smear     Neoplasm of uncertain behavior of skin     Essential hypertension     Encounter for cosmetic surgery     Hypertrophy of breast     Bilateral mastodynia     Chronic bilateral thoracic back pain     Acute cervical myofascial strain     Rash, skin

## 2020-08-10 RX ORDER — BUPROPION HYDROCHLORIDE 150 MG/1
TABLET ORAL
Qty: 90 TABLET | Refills: 1 | Status: SHIPPED | OUTPATIENT
Start: 2020-08-10 | End: 2021-02-22 | Stop reason: SDUPTHER

## 2020-09-10 NOTE — TELEPHONE ENCOUNTER
Last visit: 5/6/20  Last Med refill: 3/16/20  Does patient have enough medication for 72 hours: Yes    Next Visit Date:  Future Appointments   Date Time Provider Tania Lagos   11/9/2020  1:00 PM MD [facility] None       Health Maintenance   Topic Date Due    HIV screen  12/13/1982    DTaP/Tdap/Td vaccine (1 - Tdap) 12/13/1986    Shingles Vaccine (1 of 2) 12/13/2017    Colon cancer screen colonoscopy  12/13/2017    Cervical cancer screen  08/21/2018    Creatinine monitoring  10/03/2019    Flu vaccine (1) 09/01/2020    Potassium monitoring  06/30/2021    Breast cancer screen  11/11/2021    Lipid screen  12/14/2021    Hepatitis A vaccine  Aged Out    Hepatitis B vaccine  Aged Out    Hib vaccine  Aged Out    Meningococcal (ACWY) vaccine  Aged Out    Pneumococcal 0-64 years Vaccine  Aged Out       Hemoglobin A1C (%)   Date Value   05/22/2015 5.0             ( goal A1C is < 7)   Microalb/Crt.  Ratio (mcg/mg creat)   Date Value   05/22/2015 19     LDL Cholesterol (mg/dL)   Date Value   05/22/2015 135 (H)   06/20/2014 147 (H)     LDL Calculated (mg/dL)   Date Value   12/14/2016 138       (goal LDL is <100)   AST (U/L)   Date Value   05/22/2015 15     ALT (U/L)   Date Value   05/22/2015 11     BUN (mg/dL)   Date Value   10/03/2018 17     BP Readings from Last 3 Encounters:   07/14/20 91/63   07/14/20 (!) 154/85   02/03/20 (!) 135/99          (goal 120/80)    All Future Testing planned in [facility]  Lab Frequency Next Occurrence   CBC Once 08/30/2020   Comprehensive Metabolic Panel Once 90/99/6730               Patient Active Problem List:     Anxiety     Depression     GERD (gastroesophageal reflux disease)     Hyperlipidemia     Anemia     Endometriosis     ASCUS (atypical squamous cells of undetermined significance) on Pap smear     Neoplasm of uncertain behavior of skin     Essential hypertension     Encounter for cosmetic surgery     Hypertrophy of breast     Bilateral mastodynia     Chronic bilateral thoracic back pain     Acute cervical myofascial strain     Rash, skin

## 2020-09-14 RX ORDER — PANTOPRAZOLE SODIUM 40 MG/1
TABLET, DELAYED RELEASE ORAL
Qty: 90 TABLET | Refills: 1 | Status: SHIPPED | OUTPATIENT
Start: 2020-09-14 | End: 2021-02-22 | Stop reason: SDUPTHER

## 2020-11-12 ENCOUNTER — HOSPITAL ENCOUNTER (OUTPATIENT)
Age: 55
Discharge: HOME | End: 2020-11-12
Payer: COMMERCIAL

## 2020-11-12 PROCEDURE — U0003 INFECTIOUS AGENT DETECTION BY NUCLEIC ACID (DNA OR RNA); SEVERE ACUTE RESPIRATORY SYNDROME CORONAVIRUS 2 (SARS-COV-2) (CORONAVIRUS DISEASE [COVID-19]), AMPLIFIED PROBE TECHNIQUE, MAKING USE OF HIGH THROUGHPUT TECHNOLOGIES AS DESCRIBED BY CMS-2020-01-R: HCPCS

## 2020-11-13 LAB
SARS-COV-2, RAPID: ABNORMAL
SARS-COV-2: ABNORMAL
SARS-COV-2: DETECTED
SOURCE: ABNORMAL

## 2020-12-21 RX ORDER — HYDROCHLOROTHIAZIDE 25 MG/1
TABLET ORAL
Qty: 30 TABLET | Refills: 0 | Status: SHIPPED | OUTPATIENT
Start: 2020-12-21 | End: 2021-02-01

## 2020-12-21 NOTE — TELEPHONE ENCOUNTER
Last visit: 5/6/20  Last Med refill: 6/1/20  Does patient have enough medication for 72 hours: Yes    Next Visit Date:  No future appointments. Health Maintenance   Topic Date Due    HIV screen  12/13/1982    DTaP/Tdap/Td vaccine (1 - Tdap) 12/13/1986    Shingles Vaccine (1 of 2) 12/13/2017    Colon cancer screen colonoscopy  12/13/2017    Cervical cancer screen  08/21/2018    Creatinine monitoring  10/03/2019    Flu vaccine (1) 09/01/2020    Potassium monitoring  06/30/2021    Breast cancer screen  11/11/2021    Lipid screen  12/14/2021    Hepatitis A vaccine  Aged Out    Hepatitis B vaccine  Aged Out    Hib vaccine  Aged Out    Meningococcal (ACWY) vaccine  Aged Out    Pneumococcal 0-64 years Vaccine  Aged Out       Hemoglobin A1C (%)   Date Value   05/22/2015 5.0             ( goal A1C is < 7)   Microalb/Crt.  Ratio (mcg/mg creat)   Date Value   05/22/2015 19     LDL Cholesterol (mg/dL)   Date Value   05/22/2015 135 (H)   06/20/2014 147 (H)     LDL Calculated (mg/dL)   Date Value   12/14/2016 138       (goal LDL is <100)   AST (U/L)   Date Value   05/22/2015 15     ALT (U/L)   Date Value   05/22/2015 11     BUN (mg/dL)   Date Value   10/03/2018 17     BP Readings from Last 3 Encounters:   11/04/20 (!) 164/96   11/04/20 (!) 164/96   10/26/20 (!) 144/100          (goal 120/80)    All Future Testing planned in [facility]              Patient Active Problem List:     Anxiety     Depression     GERD (gastroesophageal reflux disease)     Hyperlipidemia     Anemia     Endometriosis     ASCUS (atypical squamous cells of undetermined significance) on Pap smear     Neoplasm of uncertain behavior of skin     Essential hypertension     Encounter for cosmetic surgery     Hypertrophy of breast     Bilateral mastodynia     Chronic bilateral thoracic back pain     Acute cervical myofascial strain     Rash, skin

## 2021-01-26 DIAGNOSIS — Z76.0 MEDICATION REFILL: ICD-10-CM

## 2021-01-26 RX ORDER — HYDROCHLOROTHIAZIDE 25 MG/1
TABLET ORAL
Qty: 30 TABLET | Refills: 0 | OUTPATIENT
Start: 2021-01-26

## 2021-01-31 DIAGNOSIS — Z76.0 MEDICATION REFILL: ICD-10-CM

## 2021-02-01 RX ORDER — HYDROCHLOROTHIAZIDE 25 MG/1
TABLET ORAL
Qty: 30 TABLET | Refills: 0 | Status: SHIPPED | OUTPATIENT
Start: 2021-02-01 | End: 2021-02-22 | Stop reason: SDUPTHER

## 2021-02-01 NOTE — TELEPHONE ENCOUNTER
Last visit: 05/06/2020  Last Med refill: 12/21/2020  Does patient have enough medication for 72 hours: No    LM for pt in regards to needing an appointment. Next Visit Date:  No future appointments. Health Maintenance   Topic Date Due    Hepatitis C screen  1965    HIV screen  12/13/1982    DTaP/Tdap/Td vaccine (1 - Tdap) 12/13/1986    Shingles Vaccine (1 of 2) 12/13/2017    Colon cancer screen colonoscopy  12/13/2017    Cervical cancer screen  08/21/2018    Creatinine monitoring  10/03/2019    Flu vaccine (1) 09/01/2020    Potassium monitoring  06/30/2021    Breast cancer screen  11/11/2021    Lipid screen  12/14/2021    Hepatitis A vaccine  Aged Out    Hepatitis B vaccine  Aged Out    Hib vaccine  Aged Out    Meningococcal (ACWY) vaccine  Aged Out    Pneumococcal 0-64 years Vaccine  Aged Out       Hemoglobin A1C (%)   Date Value   05/22/2015 5.0             ( goal A1C is < 7)   Microalb/Crt.  Ratio (mcg/mg creat)   Date Value   05/22/2015 19     LDL Cholesterol (mg/dL)   Date Value   05/22/2015 135 (H)   06/20/2014 147 (H)     LDL Calculated (mg/dL)   Date Value   12/14/2016 138       (goal LDL is <100)   AST (U/L)   Date Value   05/22/2015 15     ALT (U/L)   Date Value   05/22/2015 11     BUN (mg/dL)   Date Value   10/03/2018 17     BP Readings from Last 3 Encounters:   11/04/20 (!) 164/96   11/04/20 (!) 164/96   10/26/20 (!) 144/100          (goal 120/80)    All Future Testing planned in [facility]              Patient Active Problem List:     Anxiety     Depression     GERD (gastroesophageal reflux disease)     Hyperlipidemia     Anemia     Endometriosis     ASCUS (atypical squamous cells of undetermined significance) on Pap smear     Neoplasm of uncertain behavior of skin     Essential hypertension     Encounter for cosmetic surgery     Hypertrophy of breast     Bilateral mastodynia     Chronic bilateral thoracic back pain     Acute cervical myofascial strain     Rash, skin

## 2021-02-22 ENCOUNTER — TELEPHONE (OUTPATIENT)
Dept: FAMILY MEDICINE CLINIC | Age: 56
End: 2021-02-22

## 2021-02-22 DIAGNOSIS — Z76.0 MEDICATION REFILL: ICD-10-CM

## 2021-02-22 RX ORDER — HYDROCHLOROTHIAZIDE 25 MG/1
TABLET ORAL
Qty: 30 TABLET | Refills: 0 | Status: SHIPPED | OUTPATIENT
Start: 2021-02-22 | End: 2021-03-22 | Stop reason: SDUPTHER

## 2021-02-22 RX ORDER — BUPROPION HYDROCHLORIDE 150 MG/1
TABLET ORAL
Qty: 30 TABLET | Refills: 0 | Status: SHIPPED | OUTPATIENT
Start: 2021-02-22 | End: 2021-03-22 | Stop reason: SDUPTHER

## 2021-02-22 RX ORDER — PANTOPRAZOLE SODIUM 40 MG/1
TABLET, DELAYED RELEASE ORAL
Qty: 30 TABLET | Refills: 0 | Status: SHIPPED | OUTPATIENT
Start: 2021-02-22 | End: 2021-03-22 | Stop reason: SDUPTHER

## 2021-02-22 NOTE — TELEPHONE ENCOUNTER
Patient came to office, she thought her appointment was for today when it is scheduled for March.  She is asking for refill on medications until she is seen

## 2021-03-22 ENCOUNTER — OFFICE VISIT (OUTPATIENT)
Dept: FAMILY MEDICINE CLINIC | Age: 56
End: 2021-03-22
Payer: COMMERCIAL

## 2021-03-22 VITALS
BODY MASS INDEX: 29.23 KG/M2 | HEART RATE: 83 BPM | DIASTOLIC BLOOD PRESSURE: 70 MMHG | WEIGHT: 165 LBS | SYSTOLIC BLOOD PRESSURE: 130 MMHG | OXYGEN SATURATION: 96 %

## 2021-03-22 DIAGNOSIS — Z76.0 MEDICATION REFILL: ICD-10-CM

## 2021-03-22 DIAGNOSIS — R19.5 CHANGE IN STOOL: ICD-10-CM

## 2021-03-22 DIAGNOSIS — Z13.29 SCREENING FOR THYROID DISORDER: ICD-10-CM

## 2021-03-22 DIAGNOSIS — F41.9 CHRONIC ANXIETY: ICD-10-CM

## 2021-03-22 DIAGNOSIS — Z12.12 SCREENING FOR COLORECTAL CANCER: ICD-10-CM

## 2021-03-22 DIAGNOSIS — E78.5 HYPERLIPIDEMIA, UNSPECIFIED HYPERLIPIDEMIA TYPE: ICD-10-CM

## 2021-03-22 DIAGNOSIS — Z12.11 SCREENING FOR COLORECTAL CANCER: ICD-10-CM

## 2021-03-22 DIAGNOSIS — I10 ESSENTIAL HYPERTENSION: Primary | ICD-10-CM

## 2021-03-22 PROCEDURE — 99214 OFFICE O/P EST MOD 30 MIN: CPT | Performed by: NURSE PRACTITIONER

## 2021-03-22 RX ORDER — BUPROPION HYDROCHLORIDE 150 MG/1
TABLET ORAL
Qty: 90 TABLET | Refills: 1 | Status: SHIPPED | OUTPATIENT
Start: 2021-03-22 | End: 2021-10-11 | Stop reason: SDUPTHER

## 2021-03-22 RX ORDER — LORAZEPAM 0.5 MG/1
TABLET ORAL
Qty: 30 TABLET | Refills: 1 | Status: SHIPPED | OUTPATIENT
Start: 2021-03-22 | End: 2021-09-20 | Stop reason: SDUPTHER

## 2021-03-22 RX ORDER — HYDROCHLOROTHIAZIDE 25 MG/1
TABLET ORAL
Qty: 90 TABLET | Refills: 1 | Status: SHIPPED | OUTPATIENT
Start: 2021-03-22 | End: 2021-10-11 | Stop reason: SDUPTHER

## 2021-03-22 RX ORDER — ESCITALOPRAM OXALATE 10 MG/1
TABLET ORAL
Qty: 45 TABLET | Refills: 1 | Status: SHIPPED | OUTPATIENT
Start: 2021-03-22 | End: 2021-10-11 | Stop reason: SDUPTHER

## 2021-03-22 RX ORDER — PANTOPRAZOLE SODIUM 40 MG/1
TABLET, DELAYED RELEASE ORAL
Qty: 90 TABLET | Refills: 1 | Status: SHIPPED | OUTPATIENT
Start: 2021-03-22 | End: 2021-10-05

## 2021-03-22 SDOH — ECONOMIC STABILITY: FOOD INSECURITY: WITHIN THE PAST 12 MONTHS, YOU WORRIED THAT YOUR FOOD WOULD RUN OUT BEFORE YOU GOT MONEY TO BUY MORE.: NEVER TRUE

## 2021-03-22 SDOH — ECONOMIC STABILITY: FOOD INSECURITY: WITHIN THE PAST 12 MONTHS, THE FOOD YOU BOUGHT JUST DIDN'T LAST AND YOU DIDN'T HAVE MONEY TO GET MORE.: NEVER TRUE

## 2021-03-22 SDOH — ECONOMIC STABILITY: TRANSPORTATION INSECURITY
IN THE PAST 12 MONTHS, HAS LACK OF TRANSPORTATION KEPT YOU FROM MEETINGS, WORK, OR FROM GETTING THINGS NEEDED FOR DAILY LIVING?: NO

## 2021-03-22 SDOH — ECONOMIC STABILITY: INCOME INSECURITY: HOW HARD IS IT FOR YOU TO PAY FOR THE VERY BASICS LIKE FOOD, HOUSING, MEDICAL CARE, AND HEATING?: NOT HARD AT ALL

## 2021-03-22 SDOH — ECONOMIC STABILITY: TRANSPORTATION INSECURITY
IN THE PAST 12 MONTHS, HAS THE LACK OF TRANSPORTATION KEPT YOU FROM MEDICAL APPOINTMENTS OR FROM GETTING MEDICATIONS?: NO

## 2021-03-22 ASSESSMENT — ENCOUNTER SYMPTOMS
COUGH: 0
SHORTNESS OF BREATH: 0

## 2021-03-22 ASSESSMENT — PATIENT HEALTH QUESTIONNAIRE - PHQ9
2. FEELING DOWN, DEPRESSED OR HOPELESS: 1
1. LITTLE INTEREST OR PLEASURE IN DOING THINGS: 0
SUM OF ALL RESPONSES TO PHQ QUESTIONS 1-9: 1

## 2021-03-22 NOTE — PROGRESS NOTES
facility-administered medications for this visit.       Past Medical History:   Diagnosis Date    Anxiety     ASCUS (atypical squamous cells of undetermined significance) on Pap smear 2006    Depression     Endometriosis     Found on scope 8/22/12    GERD (gastroesophageal reflux disease)     HTN (hypertension)     Hyperlipidemia     Iron deficiency anemia     Menometrorrhagia     PONV (postoperative nausea and vomiting)     severe after liposuction surgery    Sinus problem       Past Surgical History:   Procedure Laterality Date    BREAST REDUCTION SURGERY Bilateral 07/14/2020    BREAST REDUCTION SURGERY Bilateral 7/14/2020    BREAST MAMMOPLASTY REDUCTION performed by Alicia Dobbins MD at [place] [address]  2004    wnl    ENDOMETRIAL BIOPSY  5/18/2012    due to menorrhagia    LAPAROSCOPY  8/22/12    Dx-->Operative, ablation of endometriosis, drainage of Rt Ovarian cyst, pelvic lavage    LEEP  2006    ecc    LIPOSUCTION  10/18/2018    abdomen , flanks, lower back    [facility] OFFICE/OUTPT VISIT,PROCEDURE ONLY N/A 10/18/2018    COSMETIC - LIPOSUCTION ABDOMEN, FLANKS, LOWER BACK SUCTION performed by Alicia Dobbins MD at [address] Bilateral 6/6/2001    cheeks---intradermal nevus    UPPER GASTROINTESTINAL ENDOSCOPY      UPPER GASTROINTESTINAL ENDOSCOPY  2004    gastritis     Family History   Problem Relation Age of Onset    Uterine Cancer Paternal Grandmother     Colon Cancer Paternal Grandmother 72    Heart Disease Father     Cancer Maternal Grandmother         bone and breast    Heart Disease Maternal Grandfather      Social History     Tobacco Use    Smoking status: Never Smoker    Smokeless tobacco: Never Used   Substance Use Topics    Alcohol use: Yes     Comment: social      Allergies   Allergen Reactions    Codeine Hives, Nausea And Vomiting and Nausea Only    Oxycodone-Acetaminophen Hives     bilat eyes; no rashes    Percocet [Oxycodone-Acetaminophen] Hives     bilat eyes; no rashes    Simvastatin Rash       Subjective:      Review of Systems   Constitutional: Negative for chills and fever. Respiratory: Negative for cough and shortness of breath. Cardiovascular: Negative for chest pain, palpitations and leg swelling. Other pertinent ROS in HPI  Objective:     /70 (Site: Left Upper Arm, Position: Sitting, Cuff Size: Medium Adult)   Pulse 83   Wt 165 lb (74.8 kg)   LMP 07/29/2015 (Approximate)   SpO2 96%   BMI 29.23 kg/m²    Physical Exam  Constitutional:       Appearance: She is well-developed. HENT:      Right Ear: External ear normal.      Left Ear: External ear normal.      Nose: Nose normal.   Neck:      Musculoskeletal: Full passive range of motion without pain and normal range of motion. Cardiovascular:      Rate and Rhythm: Normal rate and regular rhythm. Heart sounds: Normal heart sounds, S1 normal and S2 normal.   Pulmonary:      Effort: Pulmonary effort is normal. No respiratory distress. Breath sounds: Normal breath sounds. Musculoskeletal: Normal range of motion. General: No deformity. Skin:     General: Skin is warm and dry. Neurological:      Mental Status: She is alert and oriented to person, place, and time. Assessment/PLAN   1. Essential hypertension    - CBC; Future  - Comprehensive Metabolic Panel; Future    2. Hyperlipidemia, unspecified hyperlipidemia type    - Lipid, Fasting; Future    3. Medication refill    - hydroCHLOROthiazide (HYDRODIURIL) 25 MG tablet; TAKE ONE TABLET BY MOUTH DAILY  Dispense: 90 tablet; Refill: 1  - pantoprazole (PROTONIX) 40 MG tablet; TAKE ONE TABLET BY MOUTH DAILY  Dispense: 90 tablet; Refill: 1  - buPROPion (WELLBUTRIN XL) 150 MG extended release tablet; TAKE ONE TABLET BY MOUTH DAILY  Dispense: 90 tablet; Refill: 1  - escitalopram (LEXAPRO) 10 MG tablet; TAKE 1/2 TABLET BY MOUTH DAILY  Dispense: 45 tablet; Refill: 1    4.  Chronic anxiety    - LORazepam (ATIVAN) 0.5 MG tablet; TAKE ONE TABLET BY MOUTH DAILY AS NEEDED FOR ANXIETY FOR UP TO 60 DAYS  Dispense: 30 tablet; Refill: 0    5. Screening for colorectal cancer    - POCT Fecal Immunochemical Test (FIT); Future    6. Change in stool  Needs to see gi      RTO if symptoms worsen or fail to improve  Pt agreeable with plan      Patient given educational materials - see patientinstructions. Discussed use, benefit, and side effects of prescribed medications. All patient questions answered. Pt voiced understanding. Reviewed health maintenance. Instructed to continue current medications, diet andexercise. 1.  Marlise Para received counseling on the following healthy behaviors: nutrition, exercise and medication adherence  2. Patient given educationalmaterials when available - see patient instructions when applicable  3. Discussed use, benefit, and side effects of prescribed medications. Barriersto medication compliance addressed. All patient questions answered. Pt voiced understanding. 4.  Reviewed prior labs and health maintenance when applicable. 5.  Continue current medications, diet and exercise. CompletedRefills   Requested Prescriptions     Pending Prescriptions Disp Refills    hydroCHLOROthiazide (HYDRODIURIL) 25 MG tablet 90 tablet 1     Sig: TAKE ONE TABLET BY MOUTH DAILY    pantoprazole (PROTONIX) 40 MG tablet 90 tablet 1     Sig: TAKE ONE TABLET BY MOUTH DAILY    buPROPion (WELLBUTRIN XL) 150 MG extended release tablet 90 tablet 1     Sig: TAKE ONE TABLET BY MOUTH DAILY    escitalopram (LEXAPRO) 10 MG tablet 45 tablet 1     Sig: TAKE 1/2 TABLET BY MOUTH DAILY    LORazepam (ATIVAN) 0.5 MG tablet 30 tablet 0     Sig: TAKE ONE TABLET BY MOUTH DAILY AS NEEDED FOR ANXIETY FOR UP TO 60 DAYS       This note was transcribed using dictation with Dragon services. Efforts were made to correct any errors but some words may be misinterpreted.     Electronically signed by Mitra Cabrera

## 2021-04-06 DIAGNOSIS — Z76.0 MEDICATION REFILL: ICD-10-CM

## 2021-04-06 RX ORDER — HYDROCHLOROTHIAZIDE 25 MG/1
TABLET ORAL
Qty: 30 TABLET | Refills: 0 | OUTPATIENT
Start: 2021-04-06

## 2021-04-12 DIAGNOSIS — Z76.0 MEDICATION REFILL: ICD-10-CM

## 2021-04-12 RX ORDER — ESCITALOPRAM OXALATE 10 MG/1
TABLET ORAL
Qty: 45 TABLET | Refills: 1 | OUTPATIENT
Start: 2021-04-12

## 2021-04-17 DIAGNOSIS — Z76.0 MEDICATION REFILL: ICD-10-CM

## 2021-04-19 RX ORDER — ESCITALOPRAM OXALATE 10 MG/1
TABLET ORAL
Qty: 45 TABLET | Refills: 1 | OUTPATIENT
Start: 2021-04-19

## 2021-04-19 RX ORDER — HYDROCHLOROTHIAZIDE 25 MG/1
TABLET ORAL
Qty: 30 TABLET | Refills: 0 | OUTPATIENT
Start: 2021-04-19

## 2021-05-04 ENCOUNTER — OFFICE VISIT (OUTPATIENT)
Dept: OBGYN CLINIC | Age: 56
End: 2021-05-04
Payer: COMMERCIAL

## 2021-05-04 ENCOUNTER — HOSPITAL ENCOUNTER (OUTPATIENT)
Age: 56
Setting detail: SPECIMEN
Discharge: HOME | End: 2021-05-04
Payer: COMMERCIAL

## 2021-05-04 VITALS
DIASTOLIC BLOOD PRESSURE: 85 MMHG | SYSTOLIC BLOOD PRESSURE: 135 MMHG | BODY MASS INDEX: 29.41 KG/M2 | HEIGHT: 63 IN | WEIGHT: 166 LBS | HEART RATE: 70 BPM | TEMPERATURE: 97.1 F

## 2021-05-04 DIAGNOSIS — Z01.419 WOMEN'S ANNUAL ROUTINE GYNECOLOGICAL EXAMINATION: Primary | ICD-10-CM

## 2021-05-04 DIAGNOSIS — Z12.39 BREAST SCREENING: ICD-10-CM

## 2021-05-04 PROCEDURE — 99386 PREV VISIT NEW AGE 40-64: CPT | Performed by: OBSTETRICS & GYNECOLOGY

## 2021-05-04 ASSESSMENT — ENCOUNTER SYMPTOMS
COUGH: 0
ABDOMINAL PAIN: 0
BACK PAIN: 0
SHORTNESS OF BREATH: 0

## 2021-05-04 NOTE — PROGRESS NOTES
[facility]  [facility] - [address] [facility] Konrad Santos [address] [place] [address]  Dept: [phone]    Chief complaint:   Chief Complaint   Patient presents with    New Patient     last pap 3/24/04249   N, last aileen 11/11/2019 N, aileen scheduled , no concerns today        History Present Illness: Clementeen Friday is a 51 yo female who presents for her annual exam, and to establish care. She had breast reduction surgery last year. She did receive her COVID vaccines. She did have COVID in November. She is sexually active with her . She does have a decreased sex drive. She does have hot flushes and takes estroven and that helps some. She has a history of breast cancer in the family so she doesn't want hormones. She denies any vaginal dryness or dyspareunia. She denies any vaginal discharge. She denies any bladder issues. She is having issues with constipation and doesn't drink much water. Current Medications (OTC/Herbal):   Current Outpatient Medications   Medication Sig Dispense Refill    hydroCHLOROthiazide (HYDRODIURIL) 25 MG tablet TAKE ONE TABLET BY MOUTH DAILY 90 tablet 1    pantoprazole (PROTONIX) 40 MG tablet TAKE ONE TABLET BY MOUTH DAILY 90 tablet 1    buPROPion (WELLBUTRIN XL) 150 MG extended release tablet TAKE ONE TABLET BY MOUTH DAILY 90 tablet 1    escitalopram (LEXAPRO) 10 MG tablet TAKE 1/2 TABLET BY MOUTH DAILY 45 tablet 1    LORazepam (ATIVAN) 0.5 MG tablet TAKE ONE TABLET BY MOUTH DAILY AS NEEDED FOR ANXIETY FOR UP TO 60 DAYS 30 tablet 1     No current facility-administered medications for this visit. Allergies:    Allergies   Allergen Reactions    Codeine Hives, Nausea And Vomiting and Nausea Only    Oxycodone-Acetaminophen Hives     bilat eyes; no rashes    Percocet [Oxycodone-Acetaminophen] Hives     bilat eyes; no rashes    Simvastatin Rash     Past Medical History:   Past Medical History:   Diagnosis Date    Anxiety     ASCUS (atypical squamous cells of undetermined significance) on Pap smear     Depression     Endometriosis     Found on scope 12    GERD (gastroesophageal reflux disease)     HTN (hypertension)     Hyperlipidemia     Iron deficiency anemia     Menometrorrhagia     PONV (postoperative nausea and vomiting)     severe after liposuction surgery    Sinus problem      Past Surgical History:   Past Surgical History:   Procedure Laterality Date    BREAST REDUCTION SURGERY Bilateral 2020    BREAST REDUCTION SURGERY Bilateral 2020    BREAST MAMMOPLASTY REDUCTION performed by Alexsander Douglass MD at [place] [address]      wnl    ENDOMETRIAL BIOPSY  2012    due to menorrhagia    LAPAROSCOPY  12    Dx-->Operative, ablation of endometriosis, drainage of Rt Ovarian cyst, pelvic lavage    LEEP  2006    ecc    LIPOSUCTION  10/18/2018    abdomen , flanks, lower back    [facility] OFFICE/OUTPT VISIT,PROCEDURE ONLY N/A 10/18/2018    COSMETIC - LIPOSUCTION ABDOMEN, FLANKS, LOWER BACK SUCTION performed by Alexsander Douglass MD at [address] Bilateral 2001    cheeks---intradermal nevus    UPPER GASTROINTESTINAL ENDOSCOPY      UPPER GASTROINTESTINAL ENDOSCOPY      gastritis     Obstetric History:   3  Para 3  Gynecologic History: LMP 7/29/15   Menarche 14    Last Pap: 3/24/17       Any history of abnormal paps yes, a long time ago and normal since    PriorColpo/Biopsy did have a normal colp     Last Mammogram 21  Result normal  Contraception: postmenopausal  Complications: none  STDs: HPV  Psychosocial History: Occupation:   [employer]   Caffeine No    At risk for depression No    Abuse:   No  Seatbelt:   Yes  Exercise:  No    Social History     Socioeconomic History    Marital status:      Spouse name: Not on file    Number of children: Not on file    Years of education: Not on file    Highest education level: Not on file Height   5  ft    3 in,  Weight    166 lbs,   135/85 BP  Gen: alert, no apparent distress  HEENT:No pathologic skin lesions noted,NC/AT,PERRL, normal midline nontender thyroid   Lung Exam: Clear to auscultation in all fields bilaterally, without wheezes,rales or rhonchi. Cardiac Exam: Normal sinus rhythm andrate, without murmurs, rubs or gallops appreciated. Breast Exam: Symmetric without pathological skin changes, nontender without supraclavicular or axillary adenopathy or nipple discharge noted. Scars from recent breast reduction noted bilaterally. 5 mm mobile round mass under areola at 2 o'clock. Abdominal Exam: Nontender to deep palpation without organomegaly, masses or CVAT appreciated, BS positive. No spinal deformation or tenderness. External Genitalia: Normal development without vulvar,vaginal or cervical lesions noted. Normal vaginal discharge, uterus anterior, 4-6 weeks without CMT. Adnexa nontender without abnormal masses bilaterally. Rectal Exam: Omitted. Extremities: Nontender without clubbing, cyanosis or edema. F.R.O.M. Neurologic Exam: Grossly intact without noted sensorimotor deficits and oriented x 3. Assessment/Plan:   Unremarkable annual Gyn exam.    Cervical Cytology Evaluation begins at 24years old. If Negative Cytology, Follow-up screening per current guidelines. Mammograms every 1year. If 39 yo and last mammogram was negative. Breast lump under left areola - uncertain if due to surgery, pt has mammogram scheduled 5/24. Calcium and Vitamin D dosing reviewed. Colonoscopy screening reviewed (2004) as well as onset for bone density testing. Birth control and barrier recommendations discussed. STD counseling and prevention reviewed. Routine health maintenance per patients PCP.   Pt to follow up for annual exam in 1 year    Link Rajput MD  [address]

## 2021-05-06 LAB — CYTOLOGY REPORT: NORMAL

## 2021-05-12 DIAGNOSIS — Z01.419 WOMEN'S ANNUAL ROUTINE GYNECOLOGICAL EXAMINATION: Primary | ICD-10-CM

## 2021-05-15 LAB
HPV SAMPLE: NORMAL
HPV, GENOTYPE 16: NOT DETECTED
HPV, GENOTYPE 18: NOT DETECTED
HPV, HIGH RISK OTHER: NOT DETECTED
HPV, INTERPRETATION: NORMAL
SPECIMEN DESCRIPTION: NORMAL

## 2021-05-17 ENCOUNTER — OFFICE VISIT (OUTPATIENT)
Dept: GASTROENTEROLOGY | Age: 56
End: 2021-05-17
Payer: COMMERCIAL

## 2021-05-17 VITALS — WEIGHT: 168.6 LBS | BODY MASS INDEX: 29.87 KG/M2

## 2021-05-17 DIAGNOSIS — K59.00 CONSTIPATION, UNSPECIFIED CONSTIPATION TYPE: Primary | ICD-10-CM

## 2021-05-17 DIAGNOSIS — R10.13 EPIGASTRIC PAIN: ICD-10-CM

## 2021-05-17 PROCEDURE — 99204 OFFICE O/P NEW MOD 45 MIN: CPT | Performed by: INTERNAL MEDICINE

## 2021-05-17 RX ORDER — BISACODYL 5 MG
TABLET, DELAYED RELEASE (ENTERIC COATED) ORAL
Qty: 4 TABLET | Refills: 0 | Status: ON HOLD | OUTPATIENT
Start: 2021-05-17 | End: 2021-08-10 | Stop reason: ALTCHOICE

## 2021-05-17 RX ORDER — POLYETHYLENE GLYCOL 3350 17 G/17G
POWDER, FOR SOLUTION ORAL
Qty: 238 G | Refills: 0 | Status: ON HOLD | OUTPATIENT
Start: 2021-05-17 | End: 2021-08-10 | Stop reason: ALTCHOICE

## 2021-05-17 NOTE — PROGRESS NOTES
INITIAL CONSULTATION     HISTORY OF PRESENT ILLNESS: Cesar Fonseca is a 50 y.o. female referred for evaluation of altered bowel habits. She reports constipation. Hard bowel movements. She reports a bowel movement every 3 to 5 days. Pressure sensation. Occasional bright red blood per rectum after bowel movement. Mild lower abdominal cramping. Intermittent. No weight loss. Bloating. Epigastric discomfort. Mostly burning sensation. Mostly after drinking alcohol. She reports history of gastritis with EGD and colonoscopy 25 years ago. Father with possible polyps. Paternal grandmother had colon cancer.      PAST MEDICAL HISTORY:     Past Medical History:   Diagnosis Date    Anxiety     ASCUS (atypical squamous cells of undetermined significance) on Pap smear 2006    Depression     Endometriosis     Found on scope 8/22/12    GERD (gastroesophageal reflux disease)     HTN (hypertension)     Hyperlipidemia     Iron deficiency anemia     Menometrorrhagia     PONV (postoperative nausea and vomiting)     severe after liposuction surgery    Sinus problem         Past Surgical History:   Procedure Laterality Date    BREAST REDUCTION SURGERY Bilateral 07/14/2020    BREAST REDUCTION SURGERY Bilateral 7/14/2020    BREAST MAMMOPLASTY REDUCTION performed by Claudia Moses MD at [place] [address]  2004    [facility]    ENDOMETRIAL BIOPSY  5/18/2012    due to menorrhagia    LAPAROSCOPY  8/22/12    Dx-->Operative, ablation of endometriosis, drainage of Rt Ovarian cyst, pelvic lavage    LEEP  2006    ecc    LIPOSUCTION  10/18/2018    abdomen , flanks, lower back    [place] OFFICE/OUTPT VISIT,PROCEDURE ONLY N/A 10/18/2018    COSMETIC - LIPOSUCTION ABDOMEN, FLANKS, LOWER BACK SUCTION performed by Claudia Moses MD at [address] [facility] Bilateral 6/6/2001    cheeks---intradermal nevus    UPPER GASTROINTESTINAL ENDOSCOPY      UPPER GASTROINTESTINAL ENDOSCOPY  2004    gastritis CURRENT MEDICATIONS:       Current Outpatient Medications:     hydroCHLOROthiazide (HYDRODIURIL) 25 MG tablet, TAKE ONE TABLET BY MOUTH DAILY, Disp: 90 tablet, Rfl: 1    pantoprazole (PROTONIX) 40 MG tablet, TAKE ONE TABLET BY MOUTH DAILY, Disp: 90 tablet, Rfl: 1    buPROPion (WELLBUTRIN XL) 150 MG extended release tablet, TAKE ONE TABLET BY MOUTH DAILY, Disp: 90 tablet, Rfl: 1    escitalopram (LEXAPRO) 10 MG tablet, TAKE 1/2 TABLET BY MOUTH DAILY, Disp: 45 tablet, Rfl: 1    LORazepam (ATIVAN) 0.5 MG tablet, TAKE ONE TABLET BY MOUTH DAILY AS NEEDED FOR ANXIETY FOR UP TO 60 DAYS, Disp: 30 tablet, Rfl: 1       ALLERGIES:   Allergies   Allergen Reactions    Codeine Hives, Nausea And Vomiting and Nausea Only    Oxycodone-Acetaminophen Hives     bilat eyes; no rashes    Percocet [Oxycodone-Acetaminophen] Hives     bilat eyes; no rashes    Simvastatin Rash          FAMILY HISTORY: The patient's family history was reviewed. SOCIAL HISTORY:   Social History     Socioeconomic History    Marital status:      Spouse name: Not on file    Number of children: Not on file    Years of education: Not on file    Highest education level: Not on file   Occupational History    Not on file   Tobacco Use    Smoking status: Never Smoker    Smokeless tobacco: Never Used   Vaping Use    Vaping Use: Never used   Substance and Sexual Activity    Alcohol use: Yes     Comment: social    Drug use: No    Sexual activity: Yes     Partners: Male   Other Topics Concern    Not on file   Social History Narrative    Not on file     Social Determinants of Health     Financial Resource Strain: Low Risk     Difficulty of Paying Living Expenses: Not hard at all   Food Insecurity: No Food Insecurity    Worried About Running Out of Food in the Last Year: Never true    Davis of Food in the Last Year: Never true   Transportation Needs: No Transportation Needs    Lack of Transportation (Medical):  No    Lack of Transportation (Non-Medical): No   Physical Activity:     Days of Exercise per Week:     Minutes of Exercise per Session:    Stress:     Feeling of Stress :    Social Connections:     Frequency of Communication with Friends and Family:     Frequency of Social Gatherings with Friends and Family:     Attends [employer] Services:     Active Member of Clubs or Organizations:     Attends Club or Organization Meetings:     Marital Status:    Intimate Partner Violence:     Fear of Current or Ex-Partner:     Emotionally Abused:     Physically Abused:     Sexually Abused:          REVIEW OF SYSTEMS: A 14-point review of systems was obtained and pertinent positives andnegatives were enumerated above in the history of present illness. All other reviewed systems / symptoms were negative. Review of Systems      PHYSICAL EXAMINATION: Vital signs reviewed per the nursing documentation. Wt 168 lb 9.6 oz (76.5 kg)   LMP 07/29/2015 (Approximate)   BMI 29.87 kg/m²    [unfilled]   Body mass index is 29.87 kg/m². General:  A O x 3 in NAD   Psych: . Normal affect. Mentation normal   HEENT: PERRLA. Clear conjunctivae and sclerae. Moist oral mucosae, no lesions orulcers. The neck is supple, without lymphadenopathy or jugular venous distension. No masses. Normal thyroid. Cardiovascular: S1 S2 RRR no rubs or murmurs. Pulmonary: clear BL. No accessory muscle usage. Abd Exam: Soft, NT ND, no hepato or spleno megaly, +BS, no ascites. No groin masses or lymphadenopathy. Extremities: No edema. Skin: Warm skin. No skin rash. No spider nevi palmar erythema naildystrophy. Joint: No joint swelling or deformity. Neurological: intact sensory. DTR+.  No asterixis     LABORATORY DATA: Reviewed   Lab Results   Component Value Date    WBC 5.9 06/30/2020    HGB 12.9 06/30/2020    HCT 40.1 06/30/2020    MCV 89.1 06/30/2020     06/30/2020     06/30/2020    K 4.0 06/30/2020     06/30/2020 CO2 27 06/30/2020    BUN 17 10/03/2018    CREATININE 0.63 10/03/2018    LABPROT 7.3 05/11/2012    LABALBU 4.8 05/22/2015    BILITOT 0.41 05/22/2015    ALKPHOS 45 05/22/2015    AST 15 05/22/2015    ALT 11 05/22/2015    INR 1.0 05/11/2012      Lab Results   Component Value Date    RBC 4.50 06/30/2020    HGB 12.9 06/30/2020    MCV 89.1 06/30/2020    MCH 28.7 06/30/2020    MCHC 32.2 06/30/2020    RDW 13.2 06/30/2020    MPV 10.6 06/30/2020    BASOPCT 0 03/06/2017    LYMPHSABS 1.40 03/06/2017    MONOSABS 0.40 03/06/2017    NEUTROABS 3.30 03/06/2017    EOSABS 0.00 03/06/2017    BASOSABS 0.00 03/06/2017          DIAGNOSTIC TESTING:   No results found. IMPRESSION: Ms. Mike Vasquez is a 50 y.o. female with abdominal pain. Constipation. History of gastritis. Increase dietary fiber. Trial of Activia. May need to try Colace. Plan for EGD and colonoscopy. Thank you for allowing me to participate in the care of Ms. Angulo. For any further questions please do not hesitate to contact me.        MD Kadie Isaacs

## 2021-05-24 ENCOUNTER — TELEPHONE (OUTPATIENT)
Dept: FAMILY MEDICINE CLINIC | Age: 56
End: 2021-05-24

## 2021-05-24 ENCOUNTER — HOSPITAL ENCOUNTER (OUTPATIENT)
Dept: ULTRASOUND IMAGING | Age: 56
Discharge: HOME | End: 2021-05-26
Payer: COMMERCIAL

## 2021-05-24 ENCOUNTER — HOSPITAL ENCOUNTER (OUTPATIENT)
Dept: MAMMOGRAPHY | Age: 56
Discharge: HOME | End: 2021-05-26
Payer: COMMERCIAL

## 2021-05-24 DIAGNOSIS — N63.20 PAINFUL LUMPY LEFT BREAST: Primary | ICD-10-CM

## 2021-05-24 DIAGNOSIS — N64.4 PAINFUL LUMPY LEFT BREAST: ICD-10-CM

## 2021-05-24 DIAGNOSIS — R92.8 ABNORMAL MAMMOGRAM: Primary | ICD-10-CM

## 2021-05-24 DIAGNOSIS — Z12.31 ENCOUNTER FOR SCREENING MAMMOGRAM FOR BREAST CANCER: ICD-10-CM

## 2021-05-24 DIAGNOSIS — N63.20 PAINFUL LUMPY LEFT BREAST: ICD-10-CM

## 2021-05-24 DIAGNOSIS — N64.4 PAINFUL LUMPY LEFT BREAST: Primary | ICD-10-CM

## 2021-05-24 DIAGNOSIS — N63.0 BREAST LUMP: ICD-10-CM

## 2021-05-24 PROCEDURE — 77066 DX MAMMO INCL CAD BI: CPT

## 2021-05-24 PROCEDURE — 76642 ULTRASOUND BREAST LIMITED: CPT

## 2021-06-04 ENCOUNTER — HOSPITAL ENCOUNTER (OUTPATIENT)
Dept: ULTRASOUND IMAGING | Age: 56
Discharge: HOME | End: 2021-06-06
Payer: COMMERCIAL

## 2021-06-04 ENCOUNTER — HOSPITAL ENCOUNTER (OUTPATIENT)
Dept: MAMMOGRAPHY | Age: 56
Discharge: HOME | End: 2021-06-06
Payer: COMMERCIAL

## 2021-06-04 DIAGNOSIS — Z98.890 STATUS POST BREAST BIOPSY: ICD-10-CM

## 2021-06-04 DIAGNOSIS — R92.8 ABNORMAL MAMMOGRAM: ICD-10-CM

## 2021-06-04 PROCEDURE — 19083 BX BREAST 1ST LESION US IMAG: CPT

## 2021-06-04 PROCEDURE — A4648 IMPLANTABLE TISSUE MARKER: HCPCS

## 2021-06-04 PROCEDURE — 88305 TISSUE EXAM BY PATHOLOGIST: CPT

## 2021-06-04 PROCEDURE — 2500000003 HC RX 250 WO HCPCS

## 2021-06-07 LAB — SURGICAL PATHOLOGY REPORT: NORMAL

## 2021-06-18 ENCOUNTER — TELEPHONE (OUTPATIENT)
Dept: GASTROENTEROLOGY | Age: 56
End: 2021-06-18

## 2021-08-10 ENCOUNTER — ANESTHESIA (OUTPATIENT)
Dept: OPERATING ROOM | Age: 56
End: 2021-08-10
Payer: COMMERCIAL

## 2021-08-10 ENCOUNTER — HOSPITAL ENCOUNTER (OUTPATIENT)
Age: 56
Setting detail: OUTPATIENT SURGERY
Discharge: HOME | End: 2021-08-10
Attending: INTERNAL MEDICINE | Admitting: INTERNAL MEDICINE
Payer: COMMERCIAL

## 2021-08-10 ENCOUNTER — ANESTHESIA EVENT (OUTPATIENT)
Dept: OPERATING ROOM | Age: 56
End: 2021-08-10
Payer: COMMERCIAL

## 2021-08-10 VITALS
WEIGHT: 160 LBS | HEART RATE: 75 BPM | DIASTOLIC BLOOD PRESSURE: 68 MMHG | HEIGHT: 63 IN | RESPIRATION RATE: 18 BRPM | BODY MASS INDEX: 28.35 KG/M2 | SYSTOLIC BLOOD PRESSURE: 129 MMHG | OXYGEN SATURATION: 93 % | TEMPERATURE: 97.7 F

## 2021-08-10 VITALS — SYSTOLIC BLOOD PRESSURE: 110 MMHG | OXYGEN SATURATION: 100 % | DIASTOLIC BLOOD PRESSURE: 64 MMHG

## 2021-08-10 PROCEDURE — 3609010300 HC COLONOSCOPY W/BIOPSY SINGLE/MULTIPLE: Performed by: INTERNAL MEDICINE

## 2021-08-10 PROCEDURE — 3609012400 HC EGD TRANSORAL BIOPSY SINGLE/MULTIPLE: Performed by: INTERNAL MEDICINE

## 2021-08-10 PROCEDURE — 2500000003 HC RX 250 WO HCPCS

## 2021-08-10 PROCEDURE — 6360000002 HC RX W HCPCS

## 2021-08-10 PROCEDURE — 2709999900 HC NON-CHARGEABLE SUPPLY: Performed by: INTERNAL MEDICINE

## 2021-08-10 PROCEDURE — 88305 TISSUE EXAM BY PATHOLOGIST: CPT

## 2021-08-10 PROCEDURE — 3700000000 HC ANESTHESIA ATTENDED CARE: Performed by: INTERNAL MEDICINE

## 2021-08-10 PROCEDURE — 45385 COLONOSCOPY W/LESION REMOVAL: CPT | Performed by: INTERNAL MEDICINE

## 2021-08-10 PROCEDURE — 45380 COLONOSCOPY AND BIOPSY: CPT | Performed by: INTERNAL MEDICINE

## 2021-08-10 PROCEDURE — 7100000010 HC PHASE II RECOVERY - FIRST 15 MIN: Performed by: INTERNAL MEDICINE

## 2021-08-10 PROCEDURE — 43251 EGD REMOVE LESION SNARE: CPT | Performed by: INTERNAL MEDICINE

## 2021-08-10 PROCEDURE — 2580000003 HC RX 258: Performed by: ANESTHESIOLOGY

## 2021-08-10 PROCEDURE — 7100000011 HC PHASE II RECOVERY - ADDTL 15 MIN: Performed by: INTERNAL MEDICINE

## 2021-08-10 PROCEDURE — 3700000001 HC ADD 15 MINUTES (ANESTHESIA): Performed by: INTERNAL MEDICINE

## 2021-08-10 RX ORDER — LIDOCAINE HYDROCHLORIDE 10 MG/ML
1 INJECTION, SOLUTION EPIDURAL; INFILTRATION; INTRACAUDAL; PERINEURAL
Status: DISCONTINUED | OUTPATIENT
Start: 2021-08-11 | End: 2021-08-10 | Stop reason: HOSPADM

## 2021-08-10 RX ORDER — LIDOCAINE HYDROCHLORIDE 20 MG/ML
INJECTION, SOLUTION EPIDURAL; INFILTRATION; INTRACAUDAL; PERINEURAL PRN
Status: DISCONTINUED | OUTPATIENT
Start: 2021-08-10 | End: 2021-08-10 | Stop reason: SDUPTHER

## 2021-08-10 RX ORDER — PROPOFOL 10 MG/ML
INJECTION, EMULSION INTRAVENOUS PRN
Status: DISCONTINUED | OUTPATIENT
Start: 2021-08-10 | End: 2021-08-10 | Stop reason: SDUPTHER

## 2021-08-10 RX ORDER — SODIUM CHLORIDE, SODIUM LACTATE, POTASSIUM CHLORIDE, CALCIUM CHLORIDE 600; 310; 30; 20 MG/100ML; MG/100ML; MG/100ML; MG/100ML
INJECTION, SOLUTION INTRAVENOUS CONTINUOUS
Status: DISCONTINUED | OUTPATIENT
Start: 2021-08-11 | End: 2021-08-10 | Stop reason: HOSPADM

## 2021-08-10 RX ADMIN — PROPOFOL 50 MG: 10 INJECTION, EMULSION INTRAVENOUS at 08:54

## 2021-08-10 RX ADMIN — SODIUM CHLORIDE, POTASSIUM CHLORIDE, SODIUM LACTATE AND CALCIUM CHLORIDE: 600; 310; 30; 20 INJECTION, SOLUTION INTRAVENOUS at 08:54

## 2021-08-10 RX ADMIN — LIDOCAINE HYDROCHLORIDE 80 MG: 20 INJECTION, SOLUTION EPIDURAL; INFILTRATION; INTRACAUDAL; PERINEURAL at 08:54

## 2021-08-10 RX ADMIN — PROPOFOL 75 MCG/KG/MIN: 10 INJECTION, EMULSION INTRAVENOUS at 08:55

## 2021-08-10 ASSESSMENT — PULMONARY FUNCTION TESTS
PIF_VALUE: 1
PIF_VALUE: 2
PIF_VALUE: 1
PIF_VALUE: 3
PIF_VALUE: 1
PIF_VALUE: 3
PIF_VALUE: 1
PIF_VALUE: 2
PIF_VALUE: 1

## 2021-08-10 ASSESSMENT — PAIN SCALES - GENERAL
PAINLEVEL_OUTOF10: 0
PAINLEVEL_OUTOF10: 0

## 2021-08-10 ASSESSMENT — PAIN - FUNCTIONAL ASSESSMENT: PAIN_FUNCTIONAL_ASSESSMENT: 0-10

## 2021-08-10 NOTE — OP NOTE
[facility]     PROCEDURE DATE: 08/10/21    REFERRING PHYSICIAN: No ref. provider found     PRIMARY CARE PROVIDER: LUPE Oh - CNP    ATTENDING PHYSICIAN: Aimee Rangel MD     HISTORY: Ms. Ary Nails is a 50 y.o. female who presents to the [place] [address] Endoscopy unit for upper endoscopy. The patient's clinical history is remarkable for history of gastritis. Abdominal pain. She is currently medically stable and appropriate for the planned procedure. PREOPERATIVE DIAGNOSIS: History of gastritis. Abdominal pain. PROCEDURES:   1) Transoral Upper Endoscopy, hot snare polypectomy. POSTOPERATIVE DIAGNOSIS:   Multiple gastric polyps    SPECIMENS: Polyps    MEDICATIONS:   MAC per anesthesia    EBL: minimal    INSTRUMENT: Olympus GIF-H190 flexible Gastroscope. PREPARATION: The nature and character of the procedure as well as risks, benefits, and alternatives were discussed with the patient and informed consent was obtained. Complications were said to include, but were not limited to: medication allergy, medication reaction, cardiovascular and respiratory problems, bleeding, perforation, infection, and/or missed diagnosis. Following arrival in the endoscopy room, the patient was placed in the left lateral decubitus position and final time-out accomplished in the presence of the nursing staff. Baseline vital signs were obtained and reviewed, and IV sedation was subsequently initiated. FINDINGS:   Esophagus: The esophagus was inspected to the Z-line. The endoscopic exam showed no pathology. Stomach: The stomach was inspected in both forward and retroflex fashion and was appropriately distensible. The cardia, fundus, incisura, antrum and pylorus were identified via direct visualization. The endoscopic exam showed multiple fundic gland polyps in the proximal stomach. 8 polyps measuring more than 10 mm were removed by hot snare.      Duodenum: The proximal small bowel was inspected through the bulb, sweep, and second portion of the duodenum. The endoscopic exam showed no pathology. RECOMMENDATIONS:   1) Follow up with referring provider, as previously scheduled. 2) Follow up on pathology report.             Jackie Vuong

## 2021-08-10 NOTE — OP NOTE
was withdrawn and the mucosa was carefully inspected. The mucosal exam showed mild patchy erythema. Biopsies were obtained. 7 mm polyp was removed from ascending colon by cold snare. Retroflexion was performed in the rectum and showed no pathology. Withdrawal time was 9 minutes. RECOMMENDATIONS:   1) Follow up with referring provider, as previously scheduled. 2) Follow up on pathology report. 3) repeat colonoscopy in 3 years. 4) Follow up with me in office in 1-2 months.         Soha Carbajal

## 2021-08-10 NOTE — H&P
History and Physical Service   [place] [address]    HISTORY AND PHYSICAL EXAMINATION            Date of Evaluation: 8/10/2021  Patient name:  Stephany Campbell  MRN:   [id number]  YOB: 1965  PCP:    LUPE Lira CNP    History Obtained From:     Patient, medical records    History of Present Illness: This is Stephany Campbell a 50 y.o. female who presents today for a colorectal cancer screening,and EGD by Dr. Kyler Hyatt for constipation, epigastric pain, colon screening. Patient was referred to gastroenterology by PCP for c/o constipation with hard pebble like stools. . Used to have BM's 3x/week now bowel movements occurring every once or twice a week with anal pressure, some lower abdominal cramping and occasional blood noted. PGM had colon cancer and her parents had benign polyps Denies unintentional weight loss or poor appetite. Additionally c/o bloating with epigastric burning sensation. Hx gastritis in past with both EGD and colonoscopy >17 years ago  She was evaluated by Dr Kyler Hyatt 5/17/21 who advised further testing and presents today for her procedures. Patient followed the bowel prep as directed until watery clear green She denies fever, chills, cough, SOB, or chest pain.     Past Medical History:     Past Medical History:   Diagnosis Date    Anxiety     ASCUS (atypical squamous cells of undetermined significance) on Pap smear 2006    Depression     Endometriosis     Found on scope 8/22/12    GERD (gastroesophageal reflux disease)     HTN (hypertension)     Hyperlipidemia     Iron deficiency anemia     Menometrorrhagia     PONV (postoperative nausea and vomiting)     severe after liposuction surgery    Sinus problem         Past Surgical History:     Past Surgical History:   Procedure Laterality Date    BREAST REDUCTION SURGERY Bilateral 07/14/2020    BREAST REDUCTION SURGERY Bilateral 7/14/2020    BREAST MAMMOPLASTY REDUCTION performed by Radha Taylor MD at [facility]  2004    [facility]    ENDOMETRIAL BIOPSY  5/18/2012    due to menorrhagia    LAPAROSCOPY  8/22/12    Dx-->Operative, ablation of endometriosis, drainage of Rt Ovarian cyst, pelvic lavage    LEEP  2006    ecc    LIPOSUCTION  10/18/2018    abdomen , flanks, lower back    [place] OFFICE/OUTPT VISIT,PROCEDURE ONLY N/A 10/18/2018    COSMETIC - LIPOSUCTION ABDOMEN, FLANKS, LOWER BACK SUCTION performed by Brittni Robles MD at [address] Bilateral 6/6/2001    cheeks---intradermal nevus    UPPER GASTROINTESTINAL ENDOSCOPY      UPPER GASTROINTESTINAL ENDOSCOPY  2004    gastritis    US BREAST NEEDLE BIOPSY LEFT Left 6/4/2021    US BREAST NEEDLE BIOPSY LEFT 6/4/2021 [facility] ULTRASOUND        Medications Prior to Admission:     Prior to Admission medications    Medication Sig Start Date End Date Taking? Authorizing Provider   hydroCHLOROthiazide (HYDRODIURIL) 25 MG tablet TAKE ONE TABLET BY MOUTH DAILY 3/22/21   LUPE Johns CNP   pantoprazole (PROTONIX) 40 MG tablet TAKE ONE TABLET BY MOUTH DAILY 3/22/21   LUPE Johns CNP   buPROPion (WELLBUTRIN XL) 150 MG extended release tablet TAKE ONE TABLET BY MOUTH DAILY 3/22/21   LUPE Johns CNP   escitalopram (LEXAPRO) 10 MG tablet TAKE 1/2 TABLET BY MOUTH DAILY 3/22/21   LUPE Johns CNP        Allergies:     Codeine, Oxycodone-acetaminophen, Percocet [oxycodone-acetaminophen], and Simvastatin    Social History:     Tobacco:    reports that she has never smoked. She has never used smokeless tobacco.  Alcohol:      reports current alcohol use. Drug Use:  reports no history of drug use.     Family History:     Family History   Problem Relation Age of Onset    Uterine Cancer Paternal Grandmother     Colon Cancer Paternal Grandmother 72    Heart Disease Father     Cancer Maternal Grandmother         bone and breast    Heart Disease Maternal Grandfather Review of Systems:     Positive and Negative as described in HPI. CONSTITUTIONAL:  negative for fevers, chills, sweats, fatigue, weight loss  HEENT:  negative for vision, hearing changes, runny nose, throat pain  RESPIRATORY:  negative for shortness of breath, cough, congestion, wheezing. CARDIOVASCULAR:  negative for chest pain, palpitations. GASTROINTESTINAL: See HPI   GENITOURINARY:  negative for difficulty of urination, burning with urination, frequency   INTEGUMENT:  negative for rash, skin lesions, easy bruising   HEMATOLOGIC/LYMPHATIC:  negative for swelling/edema   ALLERGIC/IMMUNOLOGIC:  negative for urticaria , itching  ENDOCRINE:  negative increase in drinking, increase in urination, hot or cold intolerance  MUSCULOSKELETAL:  negative joint pains, muscle aches, swelling of joints  NEUROLOGICAL:  negative for headaches, dizziness, lightheadedness, numbness, pain, tingling extremities  BEHAVIOR/PSYCH:  negative for depression, anxiety    Physical Exam:   BP (!) 126/98   Pulse 113   Temp 96 °F (35.6 °C) (Temporal)   Resp 16   Ht 5' 3\" (1.6 m)   Wt 160 lb (72.6 kg)   LMP 07/29/2015 (Approximate)   SpO2 95%   BMI 28.34 kg/m²   Patient's last menstrual period was 07/29/2015 (approximate). [id number]    General Appearance: obese, alert, well appearing, and in no acute distress  Mental status: oriented to person, place, and time with normal affect  Head:  normocephalic, atraumatic. Eye: no icterus, redness, pupils equal and reactive, extraocular eye movements intact, conjunctiva clear  Ear: normal external ear, no discharge, hearing intact  Nose:  no drainage noted  Mouth: mucous membranes moist  Neck: supple, no carotid bruits, thyroid not palpable  Lungs: Bilateral equal air entry, clear to ausculation, no wheezing, rales or rhonchi, normal effort  Cardiovascular:  on admission to [facility] area  Apical HR 88 now, normal rate, regular rhythm, no murmur, gallop, rub.   Abdomen: Soft, round, nontender, nondistended, normal bowel sounds  Neurologic: There are no new focal motor or sensory deficits, normal muscle tone and bulk, no abnormal sensation, normal speech, cranial nerves II through XII grossly intact  Skin: No gross lesions, rashes, bruising or bleeding on exposed skin area  Extremities:  peripheral pulses palpable, no pedal edema or calf pain with palpation  Psych: normal affect     Investigations:      Laboratory Testing:  No results found for this or any previous visit (from the past 24 hour(s)). No results for input(s): HGB, HCT, WBC, MCV, PLATELET, NA, K, CL, CO2, BUN, CREATININE, GLUCOSE, INR, PROTIME, APTT, AST, ALT, LABALBU, HCG in the last 720 hours. No results for input(s): COVID19 in the last 720 hours. Imaging/Diagnostics:    No results found. Diagnosis:      1. Constipation, epigastric pain and colon screening     Plans:     1.  Colorectal cancer screening, EGD      LUPE Muller - CNP  8/10/2021  7:53 AM

## 2021-08-10 NOTE — ANESTHESIA PRE PROCEDURE
Department of Anesthesiology  Preprocedure Note       Name:  Keara Ly   Age:  50 y.o.  :  1965                                          MRN:  [id number]         Date:  8/10/2021      Surgeon: Ravindra Hernandez):  Luiz Beach MD    Procedure: Procedure(s):  COLORECTAL CANCER SCREENING, NOT HIGH RISK  EGD BIOPSY    Medications prior to admission:   Prior to Admission medications    Medication Sig Start Date End Date Taking? Authorizing Provider   hydroCHLOROthiazide (HYDRODIURIL) 25 MG tablet TAKE ONE TABLET BY MOUTH DAILY 3/22/21  Yes Rochele So, APRN - CNP   pantoprazole (PROTONIX) 40 MG tablet TAKE ONE TABLET BY MOUTH DAILY 3/22/21  Yes Rochele So, APRN - CNP   buPROPion (WELLBUTRIN XL) 150 MG extended release tablet TAKE ONE TABLET BY MOUTH DAILY 3/22/21  Yes Rochele So, APRN - CNP   escitalopram (LEXAPRO) 10 MG tablet TAKE 1/2 TABLET BY MOUTH DAILY 3/22/21  Yes Rochele So, APRN - CNP       Current medications:    Current Facility-Administered Medications   Medication Dose Route Frequency Provider Last Rate Last Admin    [START ON 2021] lactated ringers infusion   Intravenous Continuous Alan Bonilla MD        [START ON 2021] lidocaine PF 1 % injection 1 mL  1 mL Intradermal Once PRN Alan Bonilla MD         Facility-Administered Medications Ordered in Other Encounters   Medication Dose Route Frequency Provider Last Rate Last Admin    propofol injection   Intravenous PRN Libertya Meena, APRN - CRNA   50 mg at 08/10/21 0854    lidocaine PF 2 % injection   Intravenous PRN Vaibhav Tsai, APRN - CRNA   80 mg at 08/10/21 0854       Allergies:     Allergies   Allergen Reactions    Codeine Hives, Nausea And Vomiting and Nausea Only    Oxycodone-Acetaminophen Hives     bilat eyes; no rashes    Percocet [Oxycodone-Acetaminophen] Hives     bilat eyes; no rashes    Simvastatin Rash       Problem List:    Patient Active Problem List   Diagnosis Code    Anxiety F41.9    Depression F32.9    GERD (gastroesophageal reflux disease) K21.9    Hyperlipidemia E78.5    Anemia D64.9    Endometriosis N80.9    ASCUS (atypical squamous cells of undetermined significance) on Pap smear [id number]    Neoplasm of uncertain behavior of skin D48.5    Essential hypertension I10    Encounter for cosmetic surgery Z41.1    Hypertrophy of breast N62    Bilateral 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 1XXA    Rash, skin R21       Past Medical History:        Diagnosis Date    Anxiety     ASCUS (atypical squamous cells of undetermined significance) on Pap smear 2006    Depression     Endometriosis     Found on scope 8/22/12    GERD (gastroesophageal reflux disease)     HTN (hypertension)     Hyperlipidemia     Iron deficiency anemia     Menometrorrhagia     PONV (postoperative nausea and vomiting)     severe after liposuction surgery    Sinus problem        Past Surgical History:        Procedure Laterality Date    BREAST REDUCTION SURGERY Bilateral 07/14/2020    BREAST REDUCTION SURGERY Bilateral 7/14/2020    BREAST MAMMOPLASTY REDUCTION performed by Fermin Kim MD at [place] [address]  2004    [facility]    ENDOMETRIAL BIOPSY  5/18/2012    due to menorrhagia    LAPAROSCOPY  8/22/12    Dx-->Operative, ablation of endometriosis, drainage of Rt Ovarian cyst, pelvic lavage    LEEP  2006    ecc    LIPOSUCTION  10/18/2018    abdomen , flanks, lower back    [place] OFFICE/OUTPT VISIT,PROCEDURE ONLY N/A 10/18/2018    COSMETIC - LIPOSUCTION ABDOMEN, FLANKS, LOWER BACK SUCTION performed by Fermin Kim MD at [address] Bilateral 6/6/2001    cheeks---intradermal nevus    UPPER GASTROINTESTINAL ENDOSCOPY      UPPER GASTROINTESTINAL ENDOSCOPY  2004    gastritis    US BREAST NEEDLE BIOPSY LEFT Left 6/4/2021    US BREAST NEEDLE BIOPSY LEFT 6/4/2021 [facility] ULTRASOUND Social History:    Social History     Tobacco Use    Smoking status: Never Smoker    Smokeless tobacco: Never Used   Substance Use Topics    Alcohol use: Yes     Comment: social                                Counseling given: Not Answered      Vital Signs (Current):   Vitals:    08/10/21 0747 08/10/21 0748 08/10/21 0748   BP:   (!) 126/98   Pulse:   113   Resp:   16   Temp:   96 °F (35.6 °C)   TempSrc:   Temporal   SpO2:   95%   Weight:  160 lb (72.6 kg)    Height: 5' 3\" (1.6 m)                                                BP Readings from Last 3 Encounters:   08/10/21 (!) 126/98   08/10/21 118/78   06/23/21 (!) 148/93       NPO Status: Time of last liquid consumption: 0000                        Time of last solid consumption: 1800                        Date of last liquid consumption: 08/10/21                        Date of last solid food consumption: 08/08/21    BMI:   Wt Readings from Last 3 Encounters:   08/10/21 160 lb (72.6 kg)   06/23/21 168 lb (76.2 kg)   05/17/21 168 lb 9.6 oz (76.5 kg)     Body mass index is 28.34 kg/m². CBC:   Lab Results   Component Value Date    WBC 5.9 06/30/2020    RBC 4.50 06/30/2020    RBC 3.70 05/11/2012    HGB 12.9 06/30/2020    HCT 40.1 06/30/2020    MCV 89.1 06/30/2020    RDW 13.2 06/30/2020     06/30/2020     05/11/2012       CMP:   Lab Results   Component Value Date     06/30/2020    K 4.0 06/30/2020     06/30/2020    CO2 27 06/30/2020    BUN 17 10/03/2018    CREATININE 0.63 10/03/2018    GFRAA >60 10/03/2018    LABGLOM >60 10/03/2018    GLUCOSE 99 10/03/2018    GLUCOSE 91 05/11/2012    PROT 7.9 05/22/2015    CALCIUM 9.4 03/06/2017    BILITOT 0.41 05/22/2015    ALKPHOS 45 05/22/2015    AST 15 05/22/2015    ALT 11 05/22/2015       POC Tests: No results for input(s): POCGLU, POCNA, POCK, POCCL, POCBUN, POCHEMO, POCHCT in the last 72 hours.     Coags:   Lab Results   Component Value Date    PROTIME 10.6 05/11/2012    INR 1.0 05/11/2012 APTT 27.0 05/11/2012       HCG (If Applicable):   Lab Results   Component Value Date    PREGTESTUR NEGATIVE 08/13/2012    HCG NEGATIVE 08/22/2012    HCGQUANT <2 05/11/2012        ABGs: No results found for: PHART, PO2ART, QPF4AKT, SFY9VOC, BEART, Y9YMOVUF     Type & Screen (If Applicable):  No results found for: LABABO, LABRH    Drug/Infectious Status (If Applicable):  No results found for: HIV, HEPCAB    COVID-19 Screening (If Applicable):   Lab Results   Component Value Date    COVID19 DETECTED 11/13/2020    COVID19 Not Detected 07/12/2020           Anesthesia Evaluation  Patient summary reviewed and Nursing notes reviewed history of anesthetic complications:   Airway: Mallampati: II  TM distance: >3 FB   Neck ROM: full  Mouth opening: > = 3 FB Dental: normal exam         Pulmonary:normal exam        (-) COPD and asthma                           Cardiovascular:  Exercise tolerance: no interval change,   (+) hypertension:,     (-) past MI and CAD        Rate: normal                    Neuro/Psych:   (+) psychiatric history:depression/anxiety             GI/Hepatic/Renal:   (+) GERD:,           Endo/Other:        (-) diabetes mellitus               Abdominal:             Vascular: Other Findings:             Anesthesia Plan      MAC and general     ASA 2       Induction: intravenous. MIPS: prophylactic pharmacologic antiemetic agents not administered perioperatively for documented reasons. Anesthetic plan and risks discussed with patient. Plan discussed with CRNA.     Attending anesthesiologist reviewed and agrees with Preprocedure content              Nathaly Mckinght DO   8/10/2021

## 2021-08-10 NOTE — ANESTHESIA POSTPROCEDURE EVALUATION
Department of Anesthesiology  Postprocedure Note    Patient: Stephany Campbell  MRN: [id number]  YOB: 1965  Date of evaluation: 8/10/2021  Time:  2:36 PM     Procedure Summary     Date: 08/10/21 Room / Location: [place] [address] / [facility] - INPATIENT    Anesthesia Start: 0781 Anesthesia Stop: 0935    Procedures:       COLONOSCOPY WITH BIOPSY (N/A )      EGD BIOPSY (N/A ) Diagnosis: (DX CONSTIPATION, EPIGASTRIC PAIN, SCREENING)    Surgeons: Brandt Allen MD Responsible Provider: Kadie Wood DO    Anesthesia Type: MAC, general ASA Status: 2          Anesthesia Type: MAC, general    Han Phase I: Han Score: 10    Han Phase II: Han Score: 7    Last vitals: Reviewed and per EMR flowsheets.        Anesthesia Post Evaluation    Patient location during evaluation: PACU  Patient participation: complete - patient participated  Level of consciousness: awake and alert  Airway patency: patent  Nausea & Vomiting: no nausea and no vomiting  Complications: no  Cardiovascular status: hemodynamically stable  Respiratory status: acceptable  Hydration status: stable

## 2021-08-11 LAB — SURGICAL PATHOLOGY REPORT: NORMAL

## 2021-09-10 ENCOUNTER — HOSPITAL ENCOUNTER (OUTPATIENT)
Age: 56
Discharge: HOME | End: 2021-09-10
Payer: COMMERCIAL

## 2021-09-10 DIAGNOSIS — I10 ESSENTIAL HYPERTENSION: ICD-10-CM

## 2021-09-10 DIAGNOSIS — E78.5 HYPERLIPIDEMIA, UNSPECIFIED HYPERLIPIDEMIA TYPE: ICD-10-CM

## 2021-09-10 LAB
ALBUMIN SERPL-MCNC: 4.8 G/DL (ref 3.5–5.2)
ALBUMIN/GLOBULIN RATIO: 1.6 (ref 1–2.5)
ALP BLD-CCNC: 70 U/L (ref 35–104)
ALT SERPL-CCNC: 17 U/L (ref 5–33)
ANION GAP SERPL CALCULATED.3IONS-SCNC: 12 MMOL/L (ref 9–17)
AST SERPL-CCNC: 18 U/L
BILIRUB SERPL-MCNC: 0.45 MG/DL (ref 0.3–1.2)
BUN BLDV-MCNC: 14 MG/DL (ref 6–20)
BUN/CREAT BLD: NORMAL (ref 9–20)
CALCIUM SERPL-MCNC: 9.9 MG/DL (ref 8.6–10.4)
CHLORIDE BLD-SCNC: 101 MMOL/L (ref 98–107)
CHOLESTEROL, FASTING: 292 MG/DL
CHOLESTEROL/HDL RATIO: 5.6
CO2: 26 MMOL/L (ref 20–31)
CREAT SERPL-MCNC: 0.74 MG/DL (ref 0.5–0.9)
GFR AFRICAN AMERICAN: >60 ML/MIN
GFR NON-AFRICAN AMERICAN: >60 ML/MIN
GFR SERPL CREATININE-BSD FRML MDRD: NORMAL ML/MIN/{1.73_M2}
GFR SERPL CREATININE-BSD FRML MDRD: NORMAL ML/MIN/{1.73_M2}
GLUCOSE BLD-MCNC: 87 MG/DL (ref 70–99)
HCT VFR BLD CALC: 39.4 % (ref 36.3–47.1)
HDLC SERPL-MCNC: 52 MG/DL
HEMOGLOBIN: 12.1 G/DL (ref 11.9–15.1)
LDL CHOLESTEROL: 201 MG/DL (ref 0–130)
MCH RBC QN AUTO: 25.9 PG (ref 25.2–33.5)
MCHC RBC AUTO-ENTMCNC: 30.7 G/DL (ref 28.4–34.8)
MCV RBC AUTO: 84.4 FL (ref 82.6–102.9)
NRBC AUTOMATED: 0 PER 100 WBC
PDW BLD-RTO: 14.9 % (ref 11.8–14.4)
PLATELET # BLD: 238 K/UL (ref 138–453)
PMV BLD AUTO: 10.4 FL (ref 8.1–13.5)
POTASSIUM SERPL-SCNC: 3.7 MMOL/L (ref 3.7–5.3)
RBC # BLD: 4.67 M/UL (ref 3.95–5.11)
SODIUM BLD-SCNC: 139 MMOL/L (ref 135–144)
TOTAL PROTEIN: 7.8 G/DL (ref 6.4–8.3)
TRIGLYCERIDE, FASTING: 196 MG/DL
TSH SERPL DL<=0.05 MIU/L-ACNC: 1.63 MIU/L (ref 0.3–5)
VLDLC SERPL CALC-MCNC: ABNORMAL MG/DL (ref 1–30)
WBC # BLD: 4.7 K/UL (ref 3.5–11.3)

## 2021-09-10 PROCEDURE — 84443 ASSAY THYROID STIM HORMONE: CPT

## 2021-09-10 PROCEDURE — 80061 LIPID PANEL: CPT

## 2021-09-10 PROCEDURE — 36415 COLL VENOUS BLD VENIPUNCTURE: CPT

## 2021-09-10 PROCEDURE — 85027 COMPLETE CBC AUTOMATED: CPT

## 2021-09-10 PROCEDURE — 80053 COMPREHEN METABOLIC PANEL: CPT

## 2021-09-20 DIAGNOSIS — F41.9 CHRONIC ANXIETY: ICD-10-CM

## 2021-09-20 RX ORDER — LORAZEPAM 0.5 MG/1
TABLET ORAL
Qty: 30 TABLET | Refills: 0 | Status: SHIPPED | OUTPATIENT
Start: 2021-09-20 | End: 2021-10-28 | Stop reason: SDUPTHER

## 2021-09-20 NOTE — TELEPHONE ENCOUNTER
Patient did not get message that appointment needed to be rescheduled.  Patient is now rescheduled for 10/11/21 but will need refills on all meds until she can be seen including Ativan    [facility]

## 2021-09-27 ENCOUNTER — OFFICE VISIT (OUTPATIENT)
Dept: GASTROENTEROLOGY | Age: 56
End: 2021-09-27
Payer: COMMERCIAL

## 2021-09-27 VITALS
HEIGHT: 63 IN | WEIGHT: 164.7 LBS | HEART RATE: 81 BPM | DIASTOLIC BLOOD PRESSURE: 88 MMHG | BODY MASS INDEX: 29.18 KG/M2 | SYSTOLIC BLOOD PRESSURE: 131 MMHG | OXYGEN SATURATION: 97 %

## 2021-09-27 DIAGNOSIS — K59.00 CONSTIPATION, UNSPECIFIED CONSTIPATION TYPE: Primary | ICD-10-CM

## 2021-09-27 PROCEDURE — 99213 OFFICE O/P EST LOW 20 MIN: CPT | Performed by: INTERNAL MEDICINE

## 2021-09-27 RX ORDER — SENNA AND DOCUSATE SODIUM 50; 8.6 MG/1; MG/1
2 TABLET, FILM COATED ORAL DAILY
Qty: 60 TABLET | Refills: 5 | Status: SHIPPED | OUTPATIENT
Start: 2021-09-27 | End: 2021-10-27

## 2021-09-27 ASSESSMENT — ENCOUNTER SYMPTOMS: CONSTIPATION: 1

## 2021-09-27 NOTE — PROGRESS NOTES
GI CLINIC FOLLOW UP    INTERVAL HISTORY:   No referring provider defined for this encounter. Chief Complaint   Patient presents with    Follow-up    Colonoscopy     8/10     Constipation           HISTORY OF PRESENT ILLNESS: Shabnam Humphreys is a 50 y.o. female with mild constipation. Small bowel movements. No blood in the stool or melena. Some bloating. She tried fiber supplementation without much of success. Past Medical,Family, and Social History reviewed and does not contribute to the patient presentingcondition. Patient's PMH/PSH,SH,PSYCH Hx, MEDs, ALLERGIES, and ROS were all reviewed and updated in the appropriate sections.     PAST MEDICAL HISTORY:  Past Medical History:   Diagnosis Date    Anxiety     ASCUS (atypical squamous cells of undetermined significance) on Pap smear 2006    Depression     Endometriosis     Found on scope 8/22/12    GERD (gastroesophageal reflux disease)     HTN (hypertension)     Hyperlipidemia     Iron deficiency anemia     Menometrorrhagia     PONV (postoperative nausea and vomiting)     severe after liposuction surgery    Sinus problem        Past Surgical History:   Procedure Laterality Date    BREAST REDUCTION SURGERY Bilateral 07/14/2020    BREAST REDUCTION SURGERY Bilateral 7/14/2020    BREAST MAMMOPLASTY REDUCTION performed by Angie Jc MD at [place] [address]  2004    wnl    COLONOSCOPY N/A 8/10/2021    COLONOSCOPY WITH BIOPSY performed by Janeth Schneider MD at [address] [place] [address]  5/18/2012    due to menorrhagia    LAPAROSCOPY  8/22/12    Dx-->Operative, ablation of endometriosis, drainage of Rt Ovarian cyst, pelvic lavage    LEEP  2006    ecc    LIPOSUCTION  10/18/2018    abdomen , flanks, lower back    [place] OFFICE/OUTPT VISIT,PROCEDURE ONLY N/A 10/18/2018    COSMETIC - LIPOSUCTION ABDOMEN, FLANKS, LOWER BACK SUCTION performed by Angie Jc MD at [address] [place] [address] 6/6/2001    cheeks---intradermal nevus    UPPER GASTROINTESTINAL ENDOSCOPY      UPPER GASTROINTESTINAL ENDOSCOPY  2004    gastritis    UPPER GASTROINTESTINAL ENDOSCOPY N/A 8/10/2021    EGD BIOPSY performed by Janeth Schneider MD at [address] LEFT Left 6/4/2021     BREAST NEEDLE BIOPSY LEFT 6/4/2021 [facility]TRASOUND       CURRENT MEDICATIONS:    Current Outpatient Medications:     LORazepam (ATIVAN) 0.5 MG tablet, TAKE ONE TABLET BY MOUTH DAILY AS NEEDED FOR ANXIETY FOR UP TO 60 DAYS, Disp: 30 tablet, Rfl: 0    hydroCHLOROthiazide (HYDRODIURIL) 25 MG tablet, TAKE ONE TABLET BY MOUTH DAILY, Disp: 90 tablet, Rfl: 1    pantoprazole (PROTONIX) 40 MG tablet, TAKE ONE TABLET BY MOUTH DAILY, Disp: 90 tablet, Rfl: 1    buPROPion (WELLBUTRIN XL) 150 MG extended release tablet, TAKE ONE TABLET BY MOUTH DAILY, Disp: 90 tablet, Rfl: 1    escitalopram (LEXAPRO) 10 MG tablet, TAKE 1/2 TABLET BY MOUTH DAILY, Disp: 45 tablet, Rfl: 1    ALLERGIES:   Allergies   Allergen Reactions    Codeine Hives, Nausea And Vomiting and Nausea Only    Oxycodone-Acetaminophen Hives     bilat eyes; no rashes    Percocet [Oxycodone-Acetaminophen] Hives     bilat eyes; no rashes    Simvastatin Rash       FAMILY HISTORY:       Problem Relation Age of Onset    Uterine Cancer Paternal Grandmother     Colon Cancer Paternal Grandmother 72    Heart Disease Father     Cancer Maternal Grandmother         bone and breast    Heart Disease Maternal Grandfather          SOCIAL HISTORY:   Social History     Socioeconomic History    Marital status:      Spouse name: Not on file    Number of children: Not on file    Years of education: Not on file    Highest education level: Not on file   Occupational History    Not on file   Tobacco Use    Smoking status: Never Smoker    Smokeless tobacco: Never Used   Vaping Use    Vaping Use: Never used   Substance and Sexual Activity    Alcohol use: Yes     Comment: social  Drug use: No    Sexual activity: Yes     Partners: Male   Other Topics Concern    Not on file   Social History Narrative    Not on file     Social Determinants of Health     Financial Resource Strain: Low Risk     Difficulty of Paying Living Expenses: Not hard at all   Food Insecurity: No Food Insecurity    Worried About Running Out of Food in the Last Year: Never true    [address] in the Last Year: Never true   Transportation Needs: No Transportation Needs    Lack of Transportation (Medical): No    Lack of Transportation (Non-Medical): No   Physical Activity:     Days of Exercise per Week:     Minutes of Exercise per Session:    Stress:     Feeling of Stress :    Social Connections:     Frequency of Communication with Friends and Family:     Frequency of Social Gatherings with Friends and Family:     Attends Taoism Services:     Active Member of Clubs or Organizations:     Attends Club or Organization Meetings:     Marital Status:    Intimate Partner Violence:     Fear of Current or Ex-Partner:     Emotionally Abused:     Physically Abused:     Sexually Abused:        REVIEW OF SYSTEMS: A 12-point review of systemswas obtained and pertinent positives and negatives were enumerated above in the history of present illness. All other reviewed systems / symptoms were negative. Review of Systems   Gastrointestinal: Positive for constipation.            LABORATORY DATA: Reviewed  Lab Results   Component Value Date    WBC 4.7 09/10/2021    HGB 12.1 09/10/2021    HCT 39.4 09/10/2021    MCV 84.4 09/10/2021     09/10/2021     09/10/2021    K 3.7 09/10/2021     09/10/2021    CO2 26 09/10/2021    BUN 14 09/10/2021    CREATININE 0.74 09/10/2021    LABPROT 7.3 05/11/2012    LABALBU 4.8 09/10/2021    BILITOT 0.45 09/10/2021    ALKPHOS 70 09/10/2021    AST 18 09/10/2021    ALT 17 09/10/2021    INR 1.0 05/11/2012         Lab Results   Component Value Date    RBC 4.67 09/10/2021 HGB 12.1 09/10/2021    MCV 84.4 09/10/2021    MCH 25.9 09/10/2021    MCHC 30.7 09/10/2021    RDW 14.9 (H) 09/10/2021    MPV 10.4 09/10/2021    BASOPCT 0 03/06/2017    LYMPHSABS 1.40 03/06/2017    MONOSABS 0.40 03/06/2017    NEUTROABS 3.30 03/06/2017    EOSABS 0.00 03/06/2017    BASOSABS 0.00 03/06/2017         DIAGNOSTIC TESTING:     No results found. PHYSICAL EXAMINATION: Vital signs reviewed per the nursing documentation. Wt 164 lb 11.2 oz (74.7 kg)   LMP 07/29/2015 (Approximate)   BMI 29.18 kg/m²   Body mass index is 29.18 kg/m². Physical Exam      I personally reviewed the nurse's notes and documentation and I agree with her notes. General: alert, appears stated age and cooperative Psych: Normal. and Alert and oriented, appropriate affect. . Normal affect. Mentation normal  HEENT: PERRLA. Clear conjunctivae and sclerae. Moist oral mucosae, no lesions or ulcers. The neck is supple, without lymphadenopathy or jugular venous distension. No masses. Normal thyroid. Cardiovascular: S1 S2 RRR no rubs or murmurs. Pulmonary: clear BL. No accessory muscle usage. Abdominal Exam: Soft, NT ND, no hepato or spleno megaly, +BS, no ascites. IMPRESSION: Ms. Amanda Jc is a 50 y.o. female with constipation. Trial of low-dose Amitiza. Follow-up in 3 months. Thank you for allowing me to participate in the care of Ms. Angulo. For any further questions please do not hesitate to contact me. I have reviewed and agree with the ROS entered by the MA/LPN. Note is dictated utilizing voice recognition software. Unfortunately this leads to occasional typographical errors. Please contact our office if you have any questions.     Bimal Jordan MD  [facility]  O: #[phone]

## 2021-10-05 DIAGNOSIS — Z76.0 MEDICATION REFILL: ICD-10-CM

## 2021-10-05 RX ORDER — PANTOPRAZOLE SODIUM 40 MG/1
TABLET, DELAYED RELEASE ORAL
Qty: 90 TABLET | Refills: 1 | Status: SHIPPED | OUTPATIENT
Start: 2021-10-05 | End: 2022-04-05

## 2021-10-05 NOTE — TELEPHONE ENCOUNTER
Last visit: 3/22/21  Last Med refill: 3/22/21  Does patient have enough medication for 72 hours: No:     Next Visit Date:  Future Appointments   Date Time Provider Tania Lagos   10/11/2021  7:45 AM LUPE Cutler - CNP [facility]   12/13/2021  1:30 PM Yong Kate MD [facility] [address] Maintenance   Topic Date Due    Hepatitis C screen  Never done    HIV screen  Never done    Flu vaccine (1) Never done    DTaP/Tdap/Td vaccine (1 - Tdap) 03/22/2022 (Originally 12/13/1986)    Shingles Vaccine (1 of 2) 03/22/2022 (Originally 12/13/2017)    Potassium monitoring  09/10/2022    Creatinine monitoring  09/10/2022    Breast cancer screen  06/04/2023    Cervical cancer screen  05/04/2026    Lipid screen  09/10/2026    Colon cancer screen colonoscopy  08/10/2031    COVID-19 Vaccine  Completed    Hepatitis A vaccine  Aged Out    Hepatitis B vaccine  Aged Out    Hib vaccine  Aged Out    Meningococcal (ACWY) vaccine  Aged Out    Pneumococcal 0-64 years Vaccine  Aged Out       Hemoglobin A1C (%)   Date Value   05/22/2015 5.0             ( goal A1C is < 7)   Microalb/Crt.  Ratio (mcg/mg creat)   Date Value   05/22/2015 19     LDL Cholesterol (mg/dL)   Date Value   09/10/2021 201 (H)   05/22/2015 135 (H)     LDL Calculated (mg/dL)   Date Value   12/14/2016 138       (goal LDL is <100)   AST (U/L)   Date Value   09/10/2021 18     ALT (U/L)   Date Value   09/10/2021 17     BUN (mg/dL)   Date Value   09/10/2021 14     BP Readings from Last 3 Encounters:   09/27/21 131/88   08/10/21 129/68   08/10/21 110/64          (goal 120/80)    All Future Testing planned in [facility]  Lab Frequency Next Occurrence   ESOPHAGOSCOPY / EGD Once 05/17/2021               Patient Active Problem List:     Anxiety     Depression     GERD (gastroesophageal reflux disease)     Hyperlipidemia     Anemia     Endometriosis     ASCUS (atypical squamous cells of undetermined significance) on Pap smear Neoplasm of uncertain behavior of skin     Essential hypertension     Encounter for cosmetic surgery     Hypertrophy of breast     Bilateral mastodynia     Chronic bilateral thoracic back pain     Acute cervical myofascial strain     Rash, skin

## 2021-10-07 DIAGNOSIS — Z76.0 MEDICATION REFILL: ICD-10-CM

## 2021-10-07 NOTE — TELEPHONE ENCOUNTER
Last visit: 3/22/21  Last Med refill: 3/22/21  Does patient have enough medication for 72 hours: No:     Next Visit Date:  Future Appointments   Date Time Provider Tania Lagos   10/11/2021  7:45 AM LUPE Antoine CNP [facility]   12/13/2021  1:30 PM Kay Park MD [facility] [address] Maintenance   Topic Date Due    Hepatitis C screen  Never done    HIV screen  Never done    Flu vaccine (1) Never done    DTaP/Tdap/Td vaccine (1 - Tdap) 03/22/2022 (Originally 12/13/1986)    Shingles Vaccine (1 of 2) 03/22/2022 (Originally 12/13/2017)    Potassium monitoring  09/10/2022    Creatinine monitoring  09/10/2022    Breast cancer screen  06/04/2023    Cervical cancer screen  05/04/2026    Lipid screen  09/10/2026    Colon cancer screen colonoscopy  08/10/2031    COVID-19 Vaccine  Completed    Hepatitis A vaccine  Aged Out    Hepatitis B vaccine  Aged Out    Hib vaccine  Aged Out    Meningococcal (ACWY) vaccine  Aged Out    Pneumococcal 0-64 years Vaccine  Aged Out       Hemoglobin A1C (%)   Date Value   05/22/2015 5.0             ( goal A1C is < 7)   Microalb/Crt.  Ratio (mcg/mg creat)   Date Value   05/22/2015 19     LDL Cholesterol (mg/dL)   Date Value   09/10/2021 201 (H)   05/22/2015 135 (H)     LDL Calculated (mg/dL)   Date Value   12/14/2016 138       (goal LDL is <100)   AST (U/L)   Date Value   09/10/2021 18     ALT (U/L)   Date Value   09/10/2021 17     BUN (mg/dL)   Date Value   09/10/2021 14     BP Readings from Last 3 Encounters:   09/27/21 131/88   08/10/21 129/68   08/10/21 110/64          (goal 120/80)    All Future Testing planned in [facility]  Lab Frequency Next Occurrence   ESOPHAGOSCOPY / EGD Once 05/17/2021               Patient Active Problem List:     Anxiety     Depression     GERD (gastroesophageal reflux disease)     Hyperlipidemia     Anemia     Endometriosis     ASCUS (atypical squamous cells of undetermined significance) on Pap smear Neoplasm of uncertain behavior of skin     Essential hypertension     Encounter for cosmetic surgery     Hypertrophy of breast     Bilateral mastodynia     Chronic bilateral thoracic back pain     Acute cervical myofascial strain     Rash, skin

## 2021-10-11 DIAGNOSIS — Z76.0 MEDICATION REFILL: ICD-10-CM

## 2021-10-11 RX ORDER — ESCITALOPRAM OXALATE 10 MG/1
TABLET ORAL
Qty: 15 TABLET | Refills: 0 | Status: SHIPPED | OUTPATIENT
Start: 2021-10-11 | End: 2021-10-12 | Stop reason: SDUPTHER

## 2021-10-11 RX ORDER — HYDROCHLOROTHIAZIDE 25 MG/1
TABLET ORAL
Qty: 30 TABLET | Refills: 0 | Status: SHIPPED | OUTPATIENT
Start: 2021-10-11 | End: 2021-11-08

## 2021-10-11 RX ORDER — BUPROPION HYDROCHLORIDE 150 MG/1
TABLET ORAL
Qty: 30 TABLET | Refills: 0 | Status: SHIPPED | OUTPATIENT
Start: 2021-10-11 | End: 2022-05-24 | Stop reason: SDUPTHER

## 2021-10-11 NOTE — TELEPHONE ENCOUNTER
Patient had to r/s appointment due to provider out of office.  She is in need of refills:  -HCTZ  -Wellbutrin  -Lexapro    [facility]

## 2021-10-12 RX ORDER — ESCITALOPRAM OXALATE 10 MG/1
TABLET ORAL
Qty: 45 TABLET | Refills: 1 | Status: SHIPPED | OUTPATIENT
Start: 2021-10-12 | End: 2022-05-24 | Stop reason: SDUPTHER

## 2021-10-14 DIAGNOSIS — Z76.0 MEDICATION REFILL: ICD-10-CM

## 2021-10-14 RX ORDER — HYDROCHLOROTHIAZIDE 25 MG/1
TABLET ORAL
Qty: 90 TABLET | OUTPATIENT
Start: 2021-10-14

## 2021-10-28 ENCOUNTER — TELEMEDICINE (OUTPATIENT)
Dept: FAMILY MEDICINE CLINIC | Age: 56
End: 2021-10-28
Payer: COMMERCIAL

## 2021-10-28 DIAGNOSIS — N63.0 BREAST LUMP IN FEMALE: Primary | ICD-10-CM

## 2021-10-28 DIAGNOSIS — F41.9 CHRONIC ANXIETY: ICD-10-CM

## 2021-10-28 PROCEDURE — 99213 OFFICE O/P EST LOW 20 MIN: CPT | Performed by: NURSE PRACTITIONER

## 2021-10-28 RX ORDER — LORAZEPAM 0.5 MG/1
TABLET ORAL
Qty: 30 TABLET | Refills: 1 | Status: SHIPPED | OUTPATIENT
Start: 2021-10-28 | End: 2022-05-24 | Stop reason: SDUPTHER

## 2021-10-28 ASSESSMENT — ENCOUNTER SYMPTOMS
SHORTNESS OF BREATH: 0
COUGH: 0

## 2021-10-28 NOTE — PROGRESS NOTES
VISIT LOCATION: Home    TELEHEALTH EVALUATION -- Audio/Visual (During CBTKS-39 public health emergency)    Due to COVID 23 outbreak, patient's office visit was converted to a virtual visit. Patient was contacted and agreed to proceed with a virtual visit via [address] Visit  The risks and benefits of converting to a virtual visit were discussed in light of the current infectious disease epidemic. Patient also understood that insurance coverage and co-pays are up to their individual insurance plans. Jossy Stovall (:  1965) has requested an audio/video evaluation for the following concern(s):    Chief Complaint:   HPI:  Pt had a breast biopsy in . That showed fat-  She has another lump. Not painful. Shooting pains but she was told this could be related to surgery (breast reduction). Feels she has add. Doesn't pay attention well.  loeses interest.  Would like referral for evaluation. Husbands cell phone:  [phone]    Review of Systems   Constitutional: Negative for chills and fever. Respiratory: Negative for cough and shortness of breath. Cardiovascular: Negative for chest pain, palpitations and leg swelling. Prior to Visit Medications    Medication Sig Taking?  Authorizing Provider   LORazepam (ATIVAN) 0.5 MG tablet TAKE ONE TABLET BY MOUTH DAILY AS NEEDED FOR ANXIETY FOR UP TO 60 DAYS Yes LUPE Dumas CNP   escitalopram (LEXAPRO) 10 MG tablet TAKE 1/2 TABLET BY MOUTH DAILY  Omaira Peña MD   hydroCHLOROthiazide (HYDRODIURIL) 25 MG tablet TAKE ONE TABLET BY MOUTH DAILY  LUPE Clark NP   buPROPion (WELLBUTRIN XL) 150 MG extended release tablet TAKE ONE TABLET BY MOUTH DAILY  LUPE Clark NP   pantoprazole (PROTONIX) 40 MG tablet TAKE ONE TABLET BY MOUTH DAILY  LUPE Dumas CNP     Social- none    Past Medical History:   Diagnosis Date    Anxiety     ASCUS (atypical squamous cells of undetermined significance) on Pap smear 2006    Depression     Endometriosis     Found on scope 8/22/12    GERD (gastroesophageal reflux disease)     HTN (hypertension)     Hyperlipidemia     Iron deficiency anemia     Menometrorrhagia     PONV (postoperative nausea and vomiting)     severe after liposuction surgery    Sinus problem    ,   Past Surgical History:   Procedure Laterality Date    BREAST REDUCTION SURGERY Bilateral 07/14/2020    BREAST REDUCTION SURGERY Bilateral 7/14/2020    BREAST MAMMOPLASTY REDUCTION performed by Regla Oneill MD at [facility]  2004    wnl    COLONOSCOPY N/A 8/10/2021    COLONOSCOPY WITH BIOPSY performed by Julio Rojas MD at [address] [place] [address]  5/18/2012    due to menorrhagia    LAPAROSCOPY  8/22/12    Dx-->Operative, ablation of endometriosis, drainage of Rt Ovarian cyst, pelvic lavage    LEEP  2006    ecc    LIPOSUCTION  10/18/2018    abdomen , flanks, lower back    [place] OFFICE/OUTPT VISIT,PROCEDURE ONLY N/A 10/18/2018    COSMETIC - LIPOSUCTION ABDOMEN, FLANKS, LOWER BACK SUCTION performed by Regla Oneill MD at [address] Bilateral 6/6/2001    cheeks---intradermal nevus    UPPER GASTROINTESTINAL ENDOSCOPY      UPPER GASTROINTESTINAL ENDOSCOPY  2004    gastritis    UPPER GASTROINTESTINAL ENDOSCOPY N/A 8/10/2021    EGD BIOPSY performed by Julio Rojas MD at [address] Left 6/4/2021    US BREAST NEEDLE BIOPSY LEFT 6/4/2021 [facility] ULTRASOUND             [] OTHER:    Constitutional: [x] Appears well-developed and well-nourished [] No apparent distress                            [] Abnormal-   Mental status  [x] Alert and awake  [x] Oriented to person/place/time [x]Able to follow commands       Eyes:  EOM    [x]  Normal  [] Abnormal-  Sclera  [x]  Normal  [] Abnormal -         Discharge [x]  None visible  [] Abnormal -     HENT:   [x] Normocephalic, atraumatic.   [] Abnormal   [] Mouth/Throat: COVID-19 and provide continuity of care for an established patient. Services were provided through a telephone discussion virtually to substitute for in-person clinic visit.

## 2021-11-06 DIAGNOSIS — Z76.0 MEDICATION REFILL: ICD-10-CM

## 2021-11-08 RX ORDER — HYDROCHLOROTHIAZIDE 25 MG/1
TABLET ORAL
Qty: 90 TABLET | Refills: 1 | Status: SHIPPED | OUTPATIENT
Start: 2021-11-08 | End: 2022-05-20 | Stop reason: SDUPTHER

## 2021-11-08 NOTE — TELEPHONE ENCOUNTER
Last visit: 10/28/21  Last Med refill: 10/11/21  Does patient have enough medication for 72 hours: No:     Next Visit Date:  Future Appointments   Date Time Provider Tania Lagos   12/13/2021  1:30 PM Jhoan Melo MD [facility] [address] Maintenance   Topic Date Due    Hepatitis C screen  Never done    HIV screen  Never done    Flu vaccine (1) Never done    DTaP/Tdap/Td vaccine (1 - Tdap) 03/22/2022 (Originally 12/13/1986)    Shingles Vaccine (1 of 2) 03/22/2022 (Originally 12/13/2017)    Potassium monitoring  09/10/2022    Creatinine monitoring  09/10/2022    Breast cancer screen  06/04/2023    Cervical cancer screen  05/04/2026    Lipid screen  09/10/2026    Colon cancer screen colonoscopy  08/10/2031    COVID-19 Vaccine  Completed    Hepatitis A vaccine  Aged Out    Hepatitis B vaccine  Aged Out    Hib vaccine  Aged Out    Meningococcal (ACWY) vaccine  Aged Out    Pneumococcal 0-64 years Vaccine  Aged Out       Hemoglobin A1C (%)   Date Value   05/22/2015 5.0             ( goal A1C is < 7)   Microalb/Crt.  Ratio (mcg/mg creat)   Date Value   05/22/2015 19     LDL Cholesterol (mg/dL)   Date Value   09/10/2021 201 (H)   05/22/2015 135 (H)     LDL Calculated (mg/dL)   Date Value   12/14/2016 138       (goal LDL is <100)   AST (U/L)   Date Value   09/10/2021 18     ALT (U/L)   Date Value   09/10/2021 17     BUN (mg/dL)   Date Value   09/10/2021 14     BP Readings from Last 3 Encounters:   09/27/21 131/88   08/10/21 129/68   08/10/21 110/64          (goal 120/80)    All Future Testing planned in [facility]  Lab Frequency Next Occurrence   ESOPHAGOSCOPY / EGD Once 05/17/2021   NOELLE DIGITAL DIAGNOSTIC W OR WO CAD LEFT Once 10/28/2021               Patient Active Problem List:     Anxiety     Depression     GERD (gastroesophageal reflux disease)     Hyperlipidemia     Anemia     Endometriosis     ASCUS (atypical squamous cells of undetermined significance) on Pap smear     Neoplasm of uncertain behavior of skin     Essential hypertension     Encounter for cosmetic surgery     Hypertrophy of breast     Bilateral mastodynia     Chronic bilateral thoracic back pain     Acute cervical myofascial strain     Rash, skin

## 2021-11-29 ENCOUNTER — HOSPITAL ENCOUNTER (OUTPATIENT)
Dept: ULTRASOUND IMAGING | Age: 56
Discharge: HOME | End: 2021-12-01
Payer: COMMERCIAL

## 2021-11-29 ENCOUNTER — HOSPITAL ENCOUNTER (OUTPATIENT)
Dept: MAMMOGRAPHY | Age: 56
Discharge: HOME | End: 2021-12-01
Payer: COMMERCIAL

## 2021-11-29 DIAGNOSIS — N63.0 BREAST LUMP IN FEMALE: ICD-10-CM

## 2021-11-29 DIAGNOSIS — N63.0 BREAST LUMP: ICD-10-CM

## 2021-11-29 PROCEDURE — 76642 ULTRASOUND BREAST LIMITED: CPT

## 2021-11-29 PROCEDURE — G0279 TOMOSYNTHESIS, MAMMO: HCPCS

## 2021-12-02 ENCOUNTER — TELEPHONE (OUTPATIENT)
Dept: FAMILY MEDICINE CLINIC | Age: 56
End: 2021-12-02

## 2021-12-02 NOTE — TELEPHONE ENCOUNTER
----- Message from Bulmaro Alaniz sent at 12/2/2021  7:46 AM EST -----  Subject: Message to Provider    QUESTIONS  Information for Provider? Patient is calling to find out if there is   something that can be prescribed to prevent her from getting the flu. Her   daughter has the flu and she doesn't want to get it. Please call patient   back and advise.  ---------------------------------------------------------------------------  --------------  CALL BACK INFO  What is the best way for the office to contact you? OK to leave message on   voicemail  Preferred Call Back Phone Number? [phone]  ---------------------------------------------------------------------------  --------------  SCRIPT ANSWERS  Relationship to Patient?  Self

## 2022-02-22 DIAGNOSIS — F41.9 CHRONIC ANXIETY: ICD-10-CM

## 2022-02-22 RX ORDER — LORAZEPAM 0.5 MG/1
TABLET ORAL
Qty: 30 TABLET | OUTPATIENT
Start: 2022-02-22

## 2022-04-05 DIAGNOSIS — Z76.0 MEDICATION REFILL: ICD-10-CM

## 2022-04-05 RX ORDER — PANTOPRAZOLE SODIUM 40 MG/1
TABLET, DELAYED RELEASE ORAL
Qty: 30 TABLET | Refills: 2 | Status: SHIPPED | OUTPATIENT
Start: 2022-04-05 | End: 2022-05-24 | Stop reason: SDUPTHER

## 2022-05-03 PROBLEM — H02.834 DERMATOCHALASIS OF BOTH UPPER AND LOWER EYELID OF LEFT EYE: Status: ACTIVE | Noted: 2022-05-03

## 2022-05-03 PROBLEM — H53.40 FUNCTIONAL VISUAL FIELD LOSS: Status: ACTIVE | Noted: 2022-05-03

## 2022-05-03 PROBLEM — H02.831 DERMATOCHALASIS OF BOTH UPPER AND LOWER EYELID OF RIGHT EYE: Status: ACTIVE | Noted: 2022-05-03

## 2022-05-03 PROBLEM — H02.403 PTOSIS OF BOTH EYELIDS: Status: ACTIVE | Noted: 2022-05-03

## 2022-05-03 PROBLEM — H02.835 DERMATOCHALASIS OF BOTH UPPER AND LOWER EYELID OF LEFT EYE: Status: ACTIVE | Noted: 2022-05-03

## 2022-05-03 PROBLEM — H02.832 DERMATOCHALASIS OF BOTH UPPER AND LOWER EYELID OF RIGHT EYE: Status: ACTIVE | Noted: 2022-05-03

## 2022-05-20 DIAGNOSIS — Z76.0 MEDICATION REFILL: ICD-10-CM

## 2022-05-20 RX ORDER — HYDROCHLOROTHIAZIDE 25 MG/1
TABLET ORAL
Qty: 90 TABLET | Refills: 1 | Status: SHIPPED | OUTPATIENT
Start: 2022-05-20

## 2022-05-20 NOTE — TELEPHONE ENCOUNTER
Last visit: 08/2021  Last Med refill: 02/10/2022  Does patient have enough medication for 72 hours: No:     Next Visit Date:  Future Appointments   Date Time Provider Tania Lagos   5/24/2022  9:00 AM LUPE Restrepo - CNP [facility]   6/15/2022  9:45 AM ERYN Killian MD [facility] None       Health Maintenance   Topic Date Due    HIV screen  Never done    Hepatitis C screen  Never done    DTaP/Tdap/Td vaccine (1 - Tdap) Never done    Shingles vaccine (1 of 2) Never done    COVID-19 Vaccine (3 - Booster for Moderna series) 09/15/2021    Depression Monitoring  03/22/2022    Flu vaccine (Season Ended) 09/01/2022    Breast cancer screen  11/29/2023    Cervical cancer screen  05/04/2026    Lipids  09/10/2026    Colorectal Cancer Screen  08/10/2031    Hepatitis A vaccine  Aged Out    Hepatitis B vaccine  Aged Out    Hib vaccine  Aged Out    Meningococcal (ACWY) vaccine  Aged Out    Pneumococcal 0-64 years Vaccine  Aged Out       Hemoglobin A1C (%)   Date Value   05/22/2015 5.0             ( goal A1C is < 7)   Microalb/Crt.  Ratio (mcg/mg creat)   Date Value   05/22/2015 19     LDL Cholesterol (mg/dL)   Date Value   09/10/2021 201 (H)   05/22/2015 135 (H)     LDL Calculated (mg/dL)   Date Value   12/14/2016 138       (goal LDL is <100)   AST (U/L)   Date Value   09/10/2021 18     ALT (U/L)   Date Value   09/10/2021 17     BUN (mg/dL)   Date Value   09/10/2021 14     BP Readings from Last 3 Encounters:   05/02/22 (!) 159/87   09/27/21 131/88   08/10/21 129/68          (goal 120/80)    All Future Testing planned in [facility]  Lab Frequency Next Occurrence               Patient Active Problem List:     Anxiety     Depression     GERD (gastroesophageal reflux disease)     Hyperlipidemia     Anemia     Endometriosis     ASCUS (atypical squamous cells of undetermined significance) on Pap smear     Neoplasm of uncertain behavior of skin     Essential hypertension     Encounter for cosmetic surgery     Hypertrophy of breast     Bilateral mastodynia     Chronic bilateral thoracic back pain     Acute cervical myofascial strain     Rash, skin     Ptosis of both eyelids     Functional visual field loss     Dermatochalasis of both upper and lower eyelid of right eye     Dermatochalasis of both upper and lower eyelid of left eye

## 2022-05-24 ENCOUNTER — OFFICE VISIT (OUTPATIENT)
Dept: FAMILY MEDICINE CLINIC | Age: 57
End: 2022-05-24
Payer: COMMERCIAL

## 2022-05-24 VITALS
OXYGEN SATURATION: 98 % | BODY MASS INDEX: 29.76 KG/M2 | SYSTOLIC BLOOD PRESSURE: 134 MMHG | HEART RATE: 72 BPM | DIASTOLIC BLOOD PRESSURE: 84 MMHG | WEIGHT: 168 LBS

## 2022-05-24 DIAGNOSIS — Z76.0 MEDICATION REFILL: ICD-10-CM

## 2022-05-24 DIAGNOSIS — F41.9 CHRONIC ANXIETY: Primary | ICD-10-CM

## 2022-05-24 DIAGNOSIS — L65.9 THINNING HAIR: ICD-10-CM

## 2022-05-24 PROCEDURE — 99213 OFFICE O/P EST LOW 20 MIN: CPT | Performed by: NURSE PRACTITIONER

## 2022-05-24 RX ORDER — LORAZEPAM 0.5 MG/1
TABLET ORAL
Qty: 30 TABLET | Refills: 1 | Status: SHIPPED | OUTPATIENT
Start: 2022-05-24 | End: 2022-12-18

## 2022-05-24 RX ORDER — BUPROPION HYDROCHLORIDE 150 MG/1
TABLET ORAL
Qty: 90 TABLET | Refills: 1 | Status: SHIPPED | OUTPATIENT
Start: 2022-05-24

## 2022-05-24 RX ORDER — PANTOPRAZOLE SODIUM 40 MG/1
TABLET, DELAYED RELEASE ORAL
Qty: 90 TABLET | Refills: 1 | Status: SHIPPED | OUTPATIENT
Start: 2022-05-24

## 2022-05-24 RX ORDER — BIMATOPROST 3 UG/ML
SOLUTION TOPICAL
Qty: 1 EACH | Refills: 1 | Status: SHIPPED | OUTPATIENT
Start: 2022-05-24

## 2022-05-24 RX ORDER — ESCITALOPRAM OXALATE 10 MG/1
TABLET ORAL
Qty: 45 TABLET | Refills: 1 | Status: SHIPPED | OUTPATIENT
Start: 2022-05-24

## 2022-05-24 SDOH — ECONOMIC STABILITY: FOOD INSECURITY: WITHIN THE PAST 12 MONTHS, THE FOOD YOU BOUGHT JUST DIDN'T LAST AND YOU DIDN'T HAVE MONEY TO GET MORE.: NEVER TRUE

## 2022-05-24 SDOH — ECONOMIC STABILITY: FOOD INSECURITY: WITHIN THE PAST 12 MONTHS, YOU WORRIED THAT YOUR FOOD WOULD RUN OUT BEFORE YOU GOT MONEY TO BUY MORE.: NEVER TRUE

## 2022-05-24 ASSESSMENT — PATIENT HEALTH QUESTIONNAIRE - PHQ9
SUM OF ALL RESPONSES TO PHQ QUESTIONS 1-9: 0
SUM OF ALL RESPONSES TO PHQ9 QUESTIONS 1 & 2: 0
1. LITTLE INTEREST OR PLEASURE IN DOING THINGS: 0
SUM OF ALL RESPONSES TO PHQ QUESTIONS 1-9: 0
6. FEELING BAD ABOUT YOURSELF - OR THAT YOU ARE A FAILURE OR HAVE LET YOURSELF OR YOUR FAMILY DOWN: 0
SUM OF ALL RESPONSES TO PHQ QUESTIONS 1-9: 0
2. FEELING DOWN, DEPRESSED OR HOPELESS: 0
10. IF YOU CHECKED OFF ANY PROBLEMS, HOW DIFFICULT HAVE THESE PROBLEMS MADE IT FOR YOU TO DO YOUR WORK, TAKE CARE OF THINGS AT HOME, OR GET ALONG WITH OTHER PEOPLE: 0
3. TROUBLE FALLING OR STAYING ASLEEP: 0
8. MOVING OR SPEAKING SO SLOWLY THAT OTHER PEOPLE COULD HAVE NOTICED. OR THE OPPOSITE, BEING SO FIGETY OR RESTLESS THAT YOU HAVE BEEN MOVING AROUND A LOT MORE THAN USUAL: 0
SUM OF ALL RESPONSES TO PHQ QUESTIONS 1-9: 0
5. POOR APPETITE OR OVEREATING: 0
4. FEELING TIRED OR HAVING LITTLE ENERGY: 0
9. THOUGHTS THAT YOU WOULD BE BETTER OFF DEAD, OR OF HURTING YOURSELF: 0
7. TROUBLE CONCENTRATING ON THINGS, SUCH AS READING THE NEWSPAPER OR WATCHING TELEVISION: 0

## 2022-05-24 ASSESSMENT — ENCOUNTER SYMPTOMS
SHORTNESS OF BREATH: 0
COUGH: 0

## 2022-05-24 ASSESSMENT — SOCIAL DETERMINANTS OF HEALTH (SDOH): HOW HARD IS IT FOR YOU TO PAY FOR THE VERY BASICS LIKE FOOD, HOUSING, MEDICAL CARE, AND HEATING?: NOT HARD AT ALL

## 2022-05-24 NOTE — PROGRESS NOTES
Juana Prater (:  1965) is a 49 y.o. female,Established patient, here for evaluation of the following chief complaint(s):  Hypertension and Medication Refill         ASSESSMENT/PLAN:  1. Chronic anxiety  -     LORazepam (ATIVAN) 0.5 MG tablet; TAKE ONE TABLET BY MOUTH DAILY AS NEEDED FOR ANXIETY FOR UP TO 60 DAYS, Disp-30 tablet, R-1Normal  2. Medication refill  -     pantoprazole (PROTONIX) 40 MG tablet; TAKE ONE TABLET BY MOUTH DAILY, Disp-90 tablet, R-1Normal  -     escitalopram (LEXAPRO) 10 MG tablet; TAKE 1/2 TABLET BY MOUTH DAILY, Disp-45 tablet, R-1Normal  -     buPROPion (WELLBUTRIN XL) 150 MG extended release tablet; TAKE ONE TABLET BY MOUTH DAILY, Disp-90 tablet, R-1Normal  3. Thinning hair  -     bimatoprost 0.03 % SOLN; Place one drop on applicator and apply evenly along the skin of the upper eyelid at base of eyelashes once daily at bedtime; repeat procedure for second eye (use a clean applicator), Disp-1 each, R-1Normal      No follow-ups on file. Subjective   SUBJECTIVE/OBJECTIVE:  HPI   Anxiety- on lexapro, wellbutrin prn use of ativan. Would like to try latisse for thin eye lashes, this problem has gotten worse as she ages. The 10-year ASCVD risk score (Gayle Gregory, et al., 2013) is: 4.3%    Values used to calculate the score:      Age: 49 years      Sex: Female      Is Non- : No      Diabetic: No      Tobacco smoker: No      Systolic Blood Pressure: 624 mmHg      Is BP treated: Yes      HDL Cholesterol: 52 mg/dL      Total Cholesterol: 292 mg/dL    Review of Systems   Constitutional: Negative for chills and fever. Respiratory: Negative for cough and shortness of breath. Cardiovascular: Negative for chest pain, palpitations and leg swelling.           Objective    Vitals:    22 0858   BP: 134/84   Pulse: 72   SpO2: 98%     Wt Readings from Last 3 Encounters:   22 168 lb (76.2 kg)   22 164 lb (74.4 kg)   21 164 lb 11.2 oz

## 2022-07-21 ENCOUNTER — TELEPHONE (OUTPATIENT)
Dept: FAMILY MEDICINE CLINIC | Age: 57
End: 2022-07-21

## 2022-07-21 ENCOUNTER — HOSPITAL ENCOUNTER (OUTPATIENT)
Dept: PREADMISSION TESTING | Age: 57
Discharge: HOME | End: 2022-07-25
Payer: COMMERCIAL

## 2022-07-21 VITALS
TEMPERATURE: 96.2 F | SYSTOLIC BLOOD PRESSURE: 142 MMHG | WEIGHT: 155 LBS | HEART RATE: 70 BPM | HEIGHT: 63 IN | BODY MASS INDEX: 27.46 KG/M2 | DIASTOLIC BLOOD PRESSURE: 107 MMHG | OXYGEN SATURATION: 97 % | RESPIRATION RATE: 16 BRPM

## 2022-07-21 LAB
ANION GAP SERPL CALCULATED.3IONS-SCNC: 10 MMOL/L (ref 9–17)
BUN BLDV-MCNC: 14 MG/DL (ref 6–20)
CALCIUM SERPL-MCNC: 9.9 MG/DL (ref 8.6–10.4)
CHLORIDE BLD-SCNC: 101 MMOL/L (ref 98–107)
CO2: 29 MMOL/L (ref 20–31)
CREAT SERPL-MCNC: 0.61 MG/DL (ref 0.5–0.9)
GFR AFRICAN AMERICAN: >60 ML/MIN
GFR NON-AFRICAN AMERICAN: >60 ML/MIN
GFR SERPL CREATININE-BSD FRML MDRD: ABNORMAL ML/MIN/{1.73_M2}
GLUCOSE BLD-MCNC: 101 MG/DL (ref 70–99)
HCT VFR BLD CALC: 36.4 % (ref 36–46)
HEMOGLOBIN: 12.3 G/DL (ref 12–16)
MCH RBC QN AUTO: 29 PG (ref 26–34)
MCHC RBC AUTO-ENTMCNC: 33.7 G/DL (ref 31–37)
MCV RBC AUTO: 85.9 FL (ref 80–100)
PDW BLD-RTO: 14.7 % (ref 12.5–15.4)
PLATELET # BLD: 152 K/UL (ref 140–450)
PMV BLD AUTO: 8.3 FL (ref 6–12)
POTASSIUM SERPL-SCNC: 4 MMOL/L (ref 3.7–5.3)
RBC # BLD: 4.23 M/UL (ref 4–5.2)
SODIUM BLD-SCNC: 140 MMOL/L (ref 135–144)
WBC # BLD: 3.8 K/UL (ref 3.5–11)

## 2022-07-21 PROCEDURE — 93005 ELECTROCARDIOGRAM TRACING: CPT | Performed by: ANESTHESIOLOGY

## 2022-07-21 PROCEDURE — 80048 BASIC METABOLIC PNL TOTAL CA: CPT

## 2022-07-21 PROCEDURE — 85027 COMPLETE CBC AUTOMATED: CPT

## 2022-07-21 PROCEDURE — 36415 COLL VENOUS BLD VENIPUNCTURE: CPT

## 2022-07-21 NOTE — TELEPHONE ENCOUNTER
Spoke to surgeons office. Informed her that patient had elevated BP during preadmission testing and we have her scheduled for BP check up with our office on 7/28/22.     Surgeon's office stated no medical clearance was requested by surgeon, they mainly go off what Anesthesia would like

## 2022-07-21 NOTE — DISCHARGE INSTRUCTIONS
DAY OF SURGERY/PROCEDURE  GUIDELINES    As a patient at the [facility], you can expect quality medical and nursing care that is centered on your individual needs. It is our goal to make your surgical experience as comfortable and excellent as possible.  ________________________________________________________________________    The following instructions are general guidelines, if any information on this sheet is different from what your doctor has instructed you to do, please follow your doctor's instructions. Please arrive on 8/4 @ 700am      Enter through entrance C. Check in at registration     Upon arrival you will be taken to the pre-operative area to get ready for surgery, your family will stay in the waiting room and visit with you once you are ready for surgery. Due to special limitations please limit visitation to 1-2 members of your family at a time. When it is time for surgery your family will return to the waiting room. Nothing to eat, drink, smoke, suck or chew after midnight (no water, gum, mints, cigarettes, cigars, pipes, snuff, chewing tobacco, etc.) or your surgery may be canceled. Take a shower or bath on the morning of your surgery/procedure (Hibiclens if directed)    Brush your teeth, but do not swallow any water    IN CASE OF ILLNESS - If you have a cold or flu symptoms (high fever, runny nose, sore throat, cough, etc.) rash, nausea, vomiting, loose stools, and/or recent contact with someone who has a contagious disease (chick pox, measles, etc.) please call your doctor before coming to the surgery center    Take a small sip of water with heart, blood pressure, and/or seizure medication the morning of surgery.  protonix    If applicable bring your:  Inhaler (s)  Hearing aid(s)  Eyeglasses and Case (If you wear contacts they have to be removed before surgery, bring case and solution)  CPAP     DO NOT take anticoagulants (blood thinners, aspirin or aspirin-containing products) as instructed by your physician. DO NOT take any diabetic pills or insulin morning of your surgery. Wear loose, comfortable clothing that is easy to put on and take off. They will remain in post-op with the nurse. If you will be returning home the same day as your surgery, you will need to have a responsible adult (25years of age or older) present to drive you home. You will need someone stay with you at home for the first 24 hours following your surgery. This is due to the anesthesia and the medication given to you during surgery and recovery.

## 2022-07-21 NOTE — TELEPHONE ENCOUNTER
Pt is having surgery 08/04/2022 by Dr. Michelle Dunham. Pt is having a Bilateral upper eye blepharoplasty with resection of excess skin. Pt called and states she had her preadmission testing completed today and her BP was 172/96 and after check it was 142/107. Pt called in regards to changing medications or adding a htn medication. I asked if she needed a medical clearance and she stated no. I did call Dr. Michelle Dunham office to confirm no clearance was needed form PCP and I had to [facility] for surgery scheduler. I did go ahead and schedule her Monday at 1:45pm since she has not been seen since 05/24/2022. Pt was told that if her BP was elevated at the time of her surgery the surgery will be cancelled and reschedule.

## 2022-07-22 LAB
EKG ATRIAL RATE: 67 BPM
EKG P AXIS: 36 DEGREES
EKG P-R INTERVAL: 130 MS
EKG Q-T INTERVAL: 382 MS
EKG QRS DURATION: 90 MS
EKG QTC CALCULATION (BAZETT): 403 MS
EKG R AXIS: 19 DEGREES
EKG T AXIS: 49 DEGREES
EKG VENTRICULAR RATE: 67 BPM

## 2022-07-28 ENCOUNTER — OFFICE VISIT (OUTPATIENT)
Dept: FAMILY MEDICINE CLINIC | Age: 57
End: 2022-07-28
Payer: COMMERCIAL

## 2022-07-28 VITALS
OXYGEN SATURATION: 97 % | HEART RATE: 70 BPM | WEIGHT: 168 LBS | BODY MASS INDEX: 29.76 KG/M2 | DIASTOLIC BLOOD PRESSURE: 90 MMHG | SYSTOLIC BLOOD PRESSURE: 132 MMHG

## 2022-07-28 DIAGNOSIS — I10 ESSENTIAL HYPERTENSION: Primary | ICD-10-CM

## 2022-07-28 PROCEDURE — 99213 OFFICE O/P EST LOW 20 MIN: CPT | Performed by: NURSE PRACTITIONER

## 2022-07-28 RX ORDER — LISINOPRIL 10 MG/1
10 TABLET ORAL DAILY
Qty: 30 TABLET | Refills: 1 | Status: SHIPPED | OUTPATIENT
Start: 2022-07-28

## 2022-07-28 ASSESSMENT — ENCOUNTER SYMPTOMS
ABDOMINAL PAIN: 0
CONSTIPATION: 0
ORTHOPNEA: 0
NAUSEA: 0
CHEST TIGHTNESS: 0
SHORTNESS OF BREATH: 0
BACK PAIN: 0
EYE PAIN: 0
SINUS PRESSURE: 0
SINUS PAIN: 0
COLOR CHANGE: 0
DIARRHEA: 0
VOMITING: 0

## 2022-07-28 NOTE — ASSESSMENT & PLAN NOTE
Borderline controlled, changes made today: start lisinopril daily, medication adherence emphasized and lifestyle modifications recommended. Maintain a healthy weight and BMI, incorporate regular aerobic exercise and focus on stress management. Adhere to medication regimen, reduce dietary sodium and saturated fat intake - increase fruits, vegetables and whole grains to diet. When self-monitoring blood pressure use reliable instruments; measure blood pressure at least once a week and after any changes made to medication therapy. Bring in an average of home readings on two or more occasions for office visits.      Follow up in 1 month

## 2022-07-28 NOTE — PROGRESS NOTES
Michael Clemens (:  1965) is a 49 y.o. female,Established patient, here for evaluation of the following chief complaint(s):  Hypertension         ASSESSMENT/PLAN:  1. Essential hypertension  Assessment & Plan:   Borderline controlled, changes made today: start lisinopril daily, medication adherence emphasized and lifestyle modifications recommended. Maintain a healthy weight and BMI, incorporate regular aerobic exercise and focus on stress management. Adhere to medication regimen, reduce dietary sodium and saturated fat intake - increase fruits, vegetables and whole grains to diet. When self-monitoring blood pressure use reliable instruments; measure blood pressure at least once a week and after any changes made to medication therapy. Bring in an average of home readings on two or more occasions for office visits. Follow up in 1 month  Orders:  -     lisinopril (PRINIVIL;ZESTRIL) 10 MG tablet; Take 1 tablet by mouth in the morning., Disp-30 tablet, R-1Normal    Return in about 1 month (around 2022) for HTN. Subjective   SUBJECTIVE/OBJECTIVE:  Presents for acute visit to discuss HTN    Will be having bilateral blepharoplasty on 22  Went to pre op appointment and BP was quite elevated - was told surgery would be cancelled if it was greater than 180/90  Admits she was under quite a bit of stress that day, unsure if it affected it or not  Does not regularly check BP at home, not sure what it runs   Typically takes hctz at bedtime     Denies any other problems/concerns at this time     Hypertension  This is a chronic problem. The current episode started more than 1 year ago. The problem has been waxing and waning since onset. Pertinent negatives include no anxiety, chest pain, headaches, orthopnea, palpitations, peripheral edema, shortness of breath or sweats. Risk factors for coronary artery disease include obesity, stress, sedentary lifestyle and post-menopausal state.  Past treatments include diuretics. The current treatment provides moderate improvement. Compliance problems include exercise. There is no history of kidney disease. There is no history of a hypertension causing med or sleep apnea. Review of Systems   Constitutional:  Negative for activity change, chills, fatigue and unexpected weight change. HENT:  Negative for hearing loss, postnasal drip, sinus pressure and sinus pain. Eyes:  Negative for pain and visual disturbance. Respiratory:  Negative for chest tightness and shortness of breath. Cardiovascular:  Negative for chest pain, palpitations and orthopnea. Gastrointestinal:  Negative for abdominal pain, constipation, diarrhea, nausea and vomiting. Endocrine: Negative for polydipsia, polyphagia and polyuria. Genitourinary:  Negative for dysuria, flank pain, frequency and urgency. Musculoskeletal:  Negative for arthralgias, back pain and myalgias. Skin:  Negative for color change and rash. Neurological:  Negative for dizziness, weakness, light-headedness, numbness and headaches. Psychiatric/Behavioral:  Negative for confusion, hallucinations, self-injury, sleep disturbance and suicidal ideas. The patient is not nervous/anxious. Objective   Physical Exam  Vitals and nursing note reviewed. Constitutional:       Appearance: Normal appearance. HENT:      Head: Normocephalic. Right Ear: Hearing normal.      Left Ear: Hearing normal.      Nose: Nose normal.      Mouth/Throat:      Mouth: Mucous membranes are moist.      Pharynx: Oropharynx is clear. Eyes:      Extraocular Movements: Extraocular movements intact. Conjunctiva/sclera: Conjunctivae normal.      Pupils: Pupils are equal, round, and reactive to light. Cardiovascular:      Rate and Rhythm: Normal rate and regular rhythm. Pulses: Normal pulses. Heart sounds: Normal heart sounds.    Pulmonary:      Effort: Pulmonary effort is normal.      Breath sounds: Normal breath sounds. Musculoskeletal:         General: Normal range of motion. Cervical back: Normal range of motion. Skin:     General: Skin is warm and dry. Capillary Refill: Capillary refill takes less than 2 seconds. Neurological:      General: No focal deficit present. Mental Status: She is alert and oriented to person, place, and time. Mental status is at baseline. Psychiatric:         Mood and Affect: Mood normal.         Behavior: Behavior normal.         Thought Content: Thought content normal.         Judgment: Judgment normal.            Wt Readings from Last 3 Encounters:   07/28/22 168 lb (76.2 kg)   07/21/22 155 lb (70.3 kg)   06/15/22 168 lb (76.2 kg)     Temp Readings from Last 3 Encounters:   07/21/22 (!) 96.2 °F (35.7 °C) (Temporal)   08/10/21 97.7 °F (36.5 °C) (Temporal)   05/04/21 97.1 °F (36.2 °C)     BP Readings from Last 3 Encounters:   07/28/22 (!) 132/90   07/21/22 (!) 142/107   06/15/22 (!) 155/85     Pulse Readings from Last 3 Encounters:   07/28/22 70   07/21/22 70   06/15/22 75           An electronic signature was used to authenticate this note.     --LUPE Mcqueen NP

## 2022-07-28 NOTE — PROGRESS NOTES
Patient is present complaining of HTN  Patient states she had pre-op testing last week and her bp was elevated  States it was 172/96 and 142/107, states they used the machine and did not take it manually  States she has not checked her bp since then

## 2022-07-29 ENCOUNTER — ANESTHESIA EVENT (OUTPATIENT)
Dept: OPERATING ROOM | Age: 57
End: 2022-07-29
Payer: COMMERCIAL

## 2022-07-29 NOTE — DISCHARGE INSTRUCTIONS
BLEPHAROPLASTY      Activity   You have had anesthesia today  Do not drive, operate heavy equipment, consume alcoholic beverages, or make any important decisions  for 24 hours   If you are taking pain medication: Do not drive or consume alcohol. Take your time changing positions today. You may feel light headed or dizzy if you move too quickly. Continue your home medications as ordered by your physician. Avoid aspirin and over the counter medications (including vitamins, and herbal supplements) for 3 days, unless otherwise instructed by your physician. Rest for the next 24 hours. Getting enough rest will help you recover. Activity should be minimal for the first 24 hours, please avoid bending over, straining and lifting. You may need to apply ice packs for the first 24 hours. Sleep with the head of the bed elevated or sleep in a recliner to help  swelling. DO NOT cut the blue proline suture that is taped to your forehead and temple. Sutures will be removed at our first week postoperative appointment. You will then be instructed in the gentle massage. Swelling and bruising are normal after surgery. allow yourself to be patient during this time. This usually resolves within 2 weeks in most patients but it may require 6 months for all swelling to completely subside. Diet   You can eat your normal diet when you feel well. You should start off with bland foods like chicken soup, toast, or yogurt. Then advance as tolerated. Drink plenty of fluids (unless your doctor tells you not to). Your urine should be very lightly colored without a strong odor. Medicines   If the doctor gave you a prescription medicine for pain, take it as needed as prescribed.    Care of the cut (incision)   Do not change dressing   You may reinforce dressing with gauze if needed    If you have strips of tape on the cut (incision) the doctor made, leave the tape on until it falls off. Keep the area clean and dry. Call your doctor now or seek immediate medical care if:    You have pain that does not get better after you take pain medicine. You have a fever over 101°F.    You have chills    You have signs of infection, such as: Increased pain, swelling, warmth, or redness. Red streaks leading from the incision. Pus draining from the incision.    If you do not urinate within 8 hours after surgery

## 2022-08-04 ENCOUNTER — ANESTHESIA (OUTPATIENT)
Dept: OPERATING ROOM | Age: 57
End: 2022-08-04
Payer: COMMERCIAL

## 2022-08-04 ENCOUNTER — HOSPITAL ENCOUNTER (OUTPATIENT)
Age: 57
Setting detail: OUTPATIENT SURGERY
Discharge: HOME | End: 2022-08-04
Attending: PLASTIC SURGERY | Admitting: PLASTIC SURGERY
Payer: COMMERCIAL

## 2022-08-04 VITALS
HEART RATE: 59 BPM | DIASTOLIC BLOOD PRESSURE: 92 MMHG | HEIGHT: 63 IN | OXYGEN SATURATION: 98 % | BODY MASS INDEX: 27.46 KG/M2 | TEMPERATURE: 98 F | WEIGHT: 155 LBS | RESPIRATION RATE: 19 BRPM | SYSTOLIC BLOOD PRESSURE: 155 MMHG

## 2022-08-04 DIAGNOSIS — G89.18 PAIN FOLLOWING SURGERY OR PROCEDURE: Primary | ICD-10-CM

## 2022-08-04 PROCEDURE — 7100000011 HC PHASE II RECOVERY - ADDTL 15 MIN: Performed by: PLASTIC SURGERY

## 2022-08-04 PROCEDURE — 7100000010 HC PHASE II RECOVERY - FIRST 15 MIN: Performed by: PLASTIC SURGERY

## 2022-08-04 PROCEDURE — 3700000000 HC ANESTHESIA ATTENDED CARE: Performed by: PLASTIC SURGERY

## 2022-08-04 PROCEDURE — 2500000003 HC RX 250 WO HCPCS: Performed by: PLASTIC SURGERY

## 2022-08-04 PROCEDURE — 2500000003 HC RX 250 WO HCPCS: Performed by: NURSE ANESTHETIST, CERTIFIED REGISTERED

## 2022-08-04 PROCEDURE — 2580000003 HC RX 258: Performed by: ANESTHESIOLOGY

## 2022-08-04 PROCEDURE — 6360000002 HC RX W HCPCS

## 2022-08-04 PROCEDURE — 3600000012 HC SURGERY LEVEL 2 ADDTL 15MIN: Performed by: PLASTIC SURGERY

## 2022-08-04 PROCEDURE — 6360000002 HC RX W HCPCS: Performed by: NURSE ANESTHETIST, CERTIFIED REGISTERED

## 2022-08-04 PROCEDURE — 2500000003 HC RX 250 WO HCPCS

## 2022-08-04 PROCEDURE — 2709999900 HC NON-CHARGEABLE SUPPLY: Performed by: PLASTIC SURGERY

## 2022-08-04 PROCEDURE — 3600000002 HC SURGERY LEVEL 2 BASE: Performed by: PLASTIC SURGERY

## 2022-08-04 PROCEDURE — 6370000000 HC RX 637 (ALT 250 FOR IP): Performed by: PLASTIC SURGERY

## 2022-08-04 PROCEDURE — 7100000000 HC PACU RECOVERY - FIRST 15 MIN: Performed by: PLASTIC SURGERY

## 2022-08-04 PROCEDURE — 6370000000 HC RX 637 (ALT 250 FOR IP)

## 2022-08-04 PROCEDURE — 7100000001 HC PACU RECOVERY - ADDTL 15 MIN: Performed by: PLASTIC SURGERY

## 2022-08-04 PROCEDURE — 3700000001 HC ADD 15 MINUTES (ANESTHESIA): Performed by: PLASTIC SURGERY

## 2022-08-04 RX ORDER — ONDANSETRON 2 MG/ML
INJECTION INTRAMUSCULAR; INTRAVENOUS PRN
Status: DISCONTINUED | OUTPATIENT
Start: 2022-08-04 | End: 2022-08-04 | Stop reason: SDUPTHER

## 2022-08-04 RX ORDER — MIDAZOLAM HYDROCHLORIDE 2 MG/2ML
2 INJECTION, SOLUTION INTRAMUSCULAR; INTRAVENOUS
Status: DISCONTINUED | OUTPATIENT
Start: 2022-08-04 | End: 2022-08-04 | Stop reason: HOSPADM

## 2022-08-04 RX ORDER — SODIUM CHLORIDE 9 MG/ML
25 INJECTION, SOLUTION INTRAVENOUS PRN
Status: DISCONTINUED | OUTPATIENT
Start: 2022-08-04 | End: 2022-08-04 | Stop reason: HOSPADM

## 2022-08-04 RX ORDER — PROPOFOL 10 MG/ML
INJECTION, EMULSION INTRAVENOUS PRN
Status: DISCONTINUED | OUTPATIENT
Start: 2022-08-04 | End: 2022-08-04 | Stop reason: SDUPTHER

## 2022-08-04 RX ORDER — SCOLOPAMINE TRANSDERMAL SYSTEM 1 MG/1
1 PATCH, EXTENDED RELEASE TRANSDERMAL ONCE
Status: DISCONTINUED | OUTPATIENT
Start: 2022-08-04 | End: 2022-08-04 | Stop reason: HOSPADM

## 2022-08-04 RX ORDER — FAMOTIDINE 10 MG/ML
INJECTION, SOLUTION INTRAVENOUS
Status: COMPLETED
Start: 2022-08-04 | End: 2022-08-04

## 2022-08-04 RX ORDER — IPRATROPIUM BROMIDE AND ALBUTEROL SULFATE 2.5; .5 MG/3ML; MG/3ML
1 SOLUTION RESPIRATORY (INHALATION)
Status: DISCONTINUED | OUTPATIENT
Start: 2022-08-04 | End: 2022-08-04 | Stop reason: HOSPADM

## 2022-08-04 RX ORDER — SCOLOPAMINE TRANSDERMAL SYSTEM 1 MG/1
PATCH, EXTENDED RELEASE TRANSDERMAL
Status: COMPLETED
Start: 2022-08-04 | End: 2022-08-04

## 2022-08-04 RX ORDER — ACETAMINOPHEN 500 MG
500 TABLET ORAL ONCE
Status: DISCONTINUED | OUTPATIENT
Start: 2022-08-04 | End: 2022-08-04 | Stop reason: HOSPADM

## 2022-08-04 RX ORDER — LIDOCAINE HYDROCHLORIDE AND EPINEPHRINE 10; 10 MG/ML; UG/ML
INJECTION, SOLUTION INFILTRATION; PERINEURAL
Status: DISCONTINUED
Start: 2022-08-04 | End: 2022-08-04 | Stop reason: HOSPADM

## 2022-08-04 RX ORDER — LIDOCAINE HYDROCHLORIDE 10 MG/ML
INJECTION, SOLUTION EPIDURAL; INFILTRATION; INTRACAUDAL; PERINEURAL PRN
Status: DISCONTINUED | OUTPATIENT
Start: 2022-08-04 | End: 2022-08-04 | Stop reason: SDUPTHER

## 2022-08-04 RX ORDER — GLYCOPYRROLATE 0.2 MG/ML
INJECTION INTRAMUSCULAR; INTRAVENOUS PRN
Status: DISCONTINUED | OUTPATIENT
Start: 2022-08-04 | End: 2022-08-04 | Stop reason: SDUPTHER

## 2022-08-04 RX ORDER — FENTANYL CITRATE 50 UG/ML
INJECTION, SOLUTION INTRAMUSCULAR; INTRAVENOUS PRN
Status: DISCONTINUED | OUTPATIENT
Start: 2022-08-04 | End: 2022-08-04 | Stop reason: SDUPTHER

## 2022-08-04 RX ORDER — HYDROCODONE BITARTRATE AND ACETAMINOPHEN 5; 325 MG/1; MG/1
1 TABLET ORAL EVERY 6 HOURS PRN
Qty: 10 TABLET | Refills: 0 | Status: SHIPPED | OUTPATIENT
Start: 2022-08-04 | End: 2022-08-07

## 2022-08-04 RX ORDER — EPHEDRINE SULFATE/0.9% NACL/PF 50 MG/5 ML
SYRINGE (ML) INTRAVENOUS PRN
Status: DISCONTINUED | OUTPATIENT
Start: 2022-08-04 | End: 2022-08-04 | Stop reason: SDUPTHER

## 2022-08-04 RX ORDER — DIPHENHYDRAMINE HYDROCHLORIDE 50 MG/ML
12.5 INJECTION INTRAMUSCULAR; INTRAVENOUS
Status: DISCONTINUED | OUTPATIENT
Start: 2022-08-04 | End: 2022-08-04 | Stop reason: HOSPADM

## 2022-08-04 RX ORDER — HYDROCODONE BITARTRATE AND ACETAMINOPHEN 5; 325 MG/1; MG/1
TABLET ORAL
Status: COMPLETED
Start: 2022-08-04 | End: 2022-08-04

## 2022-08-04 RX ORDER — MEPERIDINE HYDROCHLORIDE 50 MG/ML
12.5 INJECTION INTRAMUSCULAR; INTRAVENOUS; SUBCUTANEOUS EVERY 5 MIN PRN
Status: DISCONTINUED | OUTPATIENT
Start: 2022-08-04 | End: 2022-08-04 | Stop reason: HOSPADM

## 2022-08-04 RX ORDER — BALANCED SALT SOLUTION ENRICHED WITH BICARBONATE, DEXTROSE, AND GLUTATHIONE
KIT INTRAOCULAR PRN
Status: DISCONTINUED | OUTPATIENT
Start: 2022-08-04 | End: 2022-08-04 | Stop reason: ALTCHOICE

## 2022-08-04 RX ORDER — HYDROCODONE BITARTRATE AND ACETAMINOPHEN 5; 325 MG/1; MG/1
1 TABLET ORAL ONCE
Status: COMPLETED | OUTPATIENT
Start: 2022-08-04 | End: 2022-08-04

## 2022-08-04 RX ORDER — SODIUM CHLORIDE, SODIUM LACTATE, POTASSIUM CHLORIDE, CALCIUM CHLORIDE 600; 310; 30; 20 MG/100ML; MG/100ML; MG/100ML; MG/100ML
INJECTION, SOLUTION INTRAVENOUS CONTINUOUS
Status: DISCONTINUED | OUTPATIENT
Start: 2022-08-04 | End: 2022-08-04 | Stop reason: HOSPADM

## 2022-08-04 RX ORDER — LABETALOL HYDROCHLORIDE 5 MG/ML
10 INJECTION, SOLUTION INTRAVENOUS
Status: DISCONTINUED | OUTPATIENT
Start: 2022-08-04 | End: 2022-08-04 | Stop reason: HOSPADM

## 2022-08-04 RX ORDER — SODIUM CHLORIDE 0.9 % (FLUSH) 0.9 %
5-40 SYRINGE (ML) INJECTION PRN
Status: DISCONTINUED | OUTPATIENT
Start: 2022-08-04 | End: 2022-08-04 | Stop reason: HOSPADM

## 2022-08-04 RX ORDER — CEPHALEXIN 500 MG/1
500 CAPSULE ORAL 3 TIMES DAILY
Qty: 21 CAPSULE | Refills: 0 | Status: SHIPPED | OUTPATIENT
Start: 2022-08-04 | End: 2022-08-11

## 2022-08-04 RX ORDER — SODIUM CHLORIDE 9 MG/ML
INJECTION, SOLUTION INTRAVENOUS CONTINUOUS
Status: DISCONTINUED | OUTPATIENT
Start: 2022-08-04 | End: 2022-08-04 | Stop reason: HOSPADM

## 2022-08-04 RX ORDER — NEOSTIGMINE METHYLSULFATE 5 MG/5 ML
SYRINGE (ML) INTRAVENOUS PRN
Status: DISCONTINUED | OUTPATIENT
Start: 2022-08-04 | End: 2022-08-04 | Stop reason: SDUPTHER

## 2022-08-04 RX ORDER — SODIUM CHLORIDE 0.9 % (FLUSH) 0.9 %
5-40 SYRINGE (ML) INJECTION EVERY 12 HOURS SCHEDULED
Status: DISCONTINUED | OUTPATIENT
Start: 2022-08-04 | End: 2022-08-04 | Stop reason: HOSPADM

## 2022-08-04 RX ORDER — SODIUM CHLORIDE 9 MG/ML
INJECTION, SOLUTION INTRAVENOUS PRN
Status: DISCONTINUED | OUTPATIENT
Start: 2022-08-04 | End: 2022-08-04 | Stop reason: HOSPADM

## 2022-08-04 RX ORDER — BALANCED SALT SOLUTION ENRICHED WITH BICARBONATE, DEXTROSE, AND GLUTATHIONE
KIT INTRAOCULAR
Status: DISCONTINUED
Start: 2022-08-04 | End: 2022-08-04 | Stop reason: HOSPADM

## 2022-08-04 RX ORDER — HYDROCODONE BITARTRATE AND ACETAMINOPHEN 5; 325 MG/1; MG/1
2 TABLET ORAL ONCE
Status: COMPLETED | OUTPATIENT
Start: 2022-08-04 | End: 2022-08-04

## 2022-08-04 RX ORDER — CEFAZOLIN SODIUM 2 G/50ML
SOLUTION INTRAVENOUS PRN
Status: DISCONTINUED | OUTPATIENT
Start: 2022-08-04 | End: 2022-08-04 | Stop reason: SDUPTHER

## 2022-08-04 RX ORDER — LIDOCAINE HYDROCHLORIDE AND EPINEPHRINE 10; 10 MG/ML; UG/ML
INJECTION, SOLUTION INFILTRATION; PERINEURAL PRN
Status: DISCONTINUED | OUTPATIENT
Start: 2022-08-04 | End: 2022-08-04 | Stop reason: ALTCHOICE

## 2022-08-04 RX ORDER — MIDAZOLAM HYDROCHLORIDE 1 MG/ML
INJECTION INTRAMUSCULAR; INTRAVENOUS PRN
Status: DISCONTINUED | OUTPATIENT
Start: 2022-08-04 | End: 2022-08-04 | Stop reason: SDUPTHER

## 2022-08-04 RX ORDER — DEXAMETHASONE SODIUM PHOSPHATE 10 MG/ML
INJECTION, SOLUTION INTRAMUSCULAR; INTRAVENOUS PRN
Status: DISCONTINUED | OUTPATIENT
Start: 2022-08-04 | End: 2022-08-04 | Stop reason: SDUPTHER

## 2022-08-04 RX ORDER — ACETAMINOPHEN 500 MG
TABLET ORAL
Status: DISCONTINUED
Start: 2022-08-04 | End: 2022-08-04 | Stop reason: HOSPADM

## 2022-08-04 RX ORDER — APREPITANT 40 MG/1
40 CAPSULE ORAL ONCE
Status: DISCONTINUED | OUTPATIENT
Start: 2022-08-04 | End: 2022-08-04 | Stop reason: HOSPADM

## 2022-08-04 RX ORDER — ONDANSETRON 2 MG/ML
4 INJECTION INTRAMUSCULAR; INTRAVENOUS
Status: COMPLETED | OUTPATIENT
Start: 2022-08-04 | End: 2022-08-04

## 2022-08-04 RX ORDER — APREPITANT 40 MG/1
CAPSULE ORAL
Status: COMPLETED
Start: 2022-08-04 | End: 2022-08-04

## 2022-08-04 RX ORDER — MINERAL OIL AND WHITE PETROLATUM 150; 830 MG/G; MG/G
OINTMENT OPHTHALMIC PRN
Status: DISCONTINUED | OUTPATIENT
Start: 2022-08-04 | End: 2022-08-04 | Stop reason: ALTCHOICE

## 2022-08-04 RX ORDER — PROMETHAZINE HYDROCHLORIDE 25 MG/ML
6.25 INJECTION, SOLUTION INTRAMUSCULAR; INTRAVENOUS EVERY 5 MIN PRN
Status: DISCONTINUED | OUTPATIENT
Start: 2022-08-04 | End: 2022-08-04 | Stop reason: HOSPADM

## 2022-08-04 RX ORDER — ONDANSETRON 2 MG/ML
INJECTION INTRAMUSCULAR; INTRAVENOUS
Status: COMPLETED
Start: 2022-08-04 | End: 2022-08-04

## 2022-08-04 RX ORDER — ROCURONIUM BROMIDE 10 MG/ML
INJECTION, SOLUTION INTRAVENOUS PRN
Status: DISCONTINUED | OUTPATIENT
Start: 2022-08-04 | End: 2022-08-04 | Stop reason: SDUPTHER

## 2022-08-04 RX ADMIN — SODIUM CHLORIDE, POTASSIUM CHLORIDE, SODIUM LACTATE AND CALCIUM CHLORIDE: 600; 310; 30; 20 INJECTION, SOLUTION INTRAVENOUS at 08:23

## 2022-08-04 RX ADMIN — FENTANYL CITRATE 50 MCG: 50 INJECTION, SOLUTION INTRAMUSCULAR; INTRAVENOUS at 08:27

## 2022-08-04 RX ADMIN — LIDOCAINE HYDROCHLORIDE 50 MG: 10 INJECTION, SOLUTION EPIDURAL; INFILTRATION; INTRACAUDAL; PERINEURAL at 08:29

## 2022-08-04 RX ADMIN — FAMOTIDINE 20 MG: 10 INJECTION, SOLUTION INTRAVENOUS at 08:19

## 2022-08-04 RX ADMIN — FENTANYL CITRATE 50 MCG: 50 INJECTION, SOLUTION INTRAMUSCULAR; INTRAVENOUS at 08:29

## 2022-08-04 RX ADMIN — HYDROCODONE BITARTRATE AND ACETAMINOPHEN 1 TABLET: 5; 325 TABLET ORAL at 10:31

## 2022-08-04 RX ADMIN — APREPITANT: 40 CAPSULE ORAL at 08:19

## 2022-08-04 RX ADMIN — ROCURONIUM BROMIDE 40 MG: 10 INJECTION, SOLUTION INTRAVENOUS at 08:29

## 2022-08-04 RX ADMIN — MIDAZOLAM HYDROCHLORIDE 2 MG: 1 INJECTION, SOLUTION INTRAMUSCULAR; INTRAVENOUS at 08:23

## 2022-08-04 RX ADMIN — GLYCOPYRROLATE 0.6 MG: 1 INJECTION INTRAMUSCULAR; INTRAVENOUS at 09:27

## 2022-08-04 RX ADMIN — ONDANSETRON 4 MG: 2 INJECTION INTRAMUSCULAR; INTRAVENOUS at 09:55

## 2022-08-04 RX ADMIN — Medication 10 MG: at 09:06

## 2022-08-04 RX ADMIN — DEXAMETHASONE SODIUM PHOSPHATE 10 MG: 10 INJECTION, SOLUTION INTRAMUSCULAR; INTRAVENOUS at 08:44

## 2022-08-04 RX ADMIN — SODIUM CHLORIDE, POTASSIUM CHLORIDE, SODIUM LACTATE AND CALCIUM CHLORIDE: 600; 310; 30; 20 INJECTION, SOLUTION INTRAVENOUS at 09:28

## 2022-08-04 RX ADMIN — CEFAZOLIN SODIUM 2000 MG: 2 SOLUTION INTRAVENOUS at 08:23

## 2022-08-04 RX ADMIN — Medication 3 MG: at 09:27

## 2022-08-04 RX ADMIN — PROPOFOL 150 MG: 10 INJECTION, EMULSION INTRAVENOUS at 08:29

## 2022-08-04 RX ADMIN — Medication 10 MG: at 09:07

## 2022-08-04 RX ADMIN — ONDANSETRON 4 MG: 2 INJECTION INTRAMUSCULAR; INTRAVENOUS at 09:26

## 2022-08-04 ASSESSMENT — PAIN SCALES - GENERAL
PAINLEVEL_OUTOF10: 4
PAINLEVEL_OUTOF10: 3
PAINLEVEL_OUTOF10: 3

## 2022-08-04 ASSESSMENT — PAIN DESCRIPTION - DESCRIPTORS: DESCRIPTORS: ACHING

## 2022-08-04 ASSESSMENT — PAIN DESCRIPTION - LOCATION: LOCATION: EYE;HEAD

## 2022-08-04 NOTE — H&P
Office Note     ROLANDO Dale MD, FACS     Subjective:      Patient ID: Mary Lau is a 49 y.o. female. HPI  Patient comes in today following evaluation with visual field testing. She has brought her visual field test results with her today. Results were reviewed with the patient. She has obvious visual field deficits bilaterally due to heavy upper eyelids which overhangs the lid margins. The visual field testing confirms her visual field deficits. Patient has difficulty with everyday activities due to her visual field deficits and cannot drive effectively without lifting her brow due to the overhanging upper eyelids which cause visual field deficits and inability to see cars to her right and left. The visual field testing shows 100% improvement with taping of her visual field deficits.      Review of Systems     Past Medical History        Past Medical History:   Diagnosis Date    Anxiety      ASCUS (atypical squamous cells of undetermined significance) on Pap smear 2006    Depression      Endometriosis       Found on scope 8/22/12    GERD (gastroesophageal reflux disease)      HTN (hypertension)      Hyperlipidemia      Iron deficiency anemia      Menometrorrhagia      PONV (postoperative nausea and vomiting)       severe after liposuction surgery    Sinus problem           Past Surgical History         Past Surgical History:   Procedure Laterality Date    BREAST REDUCTION SURGERY Bilateral 07/14/2020    BREAST REDUCTION SURGERY Bilateral 7/14/2020     BREAST MAMMOPLASTY REDUCTION performed by Ted Wong MD at [address] [place] [address]   2004     wnl    COLONOSCOPY N/A 8/10/2021     COLONOSCOPY WITH BIOPSY performed by Clifton Barboza MD at [address] [place] [address]   5/18/2012     due to menorrhagia    LAPAROSCOPY   8/22/12     Dx-->Operative, ablation of endometriosis, drainage of Rt Ovarian cyst, pelvic lavage    LEEP   2006     ecc    LIPOSUCTION   10/18/2018 abdomen , flanks, lower back    NC OFFICE/OUTPT VISIT,PROCEDURE ONLY N/A 10/18/2018     COSMETIC - LIPOSUCTION ABDOMEN, FLANKS, LOWER BACK SUCTION performed by Keith Pendleton MD at [address] Bilateral 6/6/2001     cheeks---intradermal nevus    UPPER GASTROINTESTINAL ENDOSCOPY        UPPER GASTROINTESTINAL ENDOSCOPY   2004     gastritis    UPPER GASTROINTESTINAL ENDOSCOPY N/A 8/10/2021     EGD BIOPSY performed by Prasanth Duran MD at [address] LEFT Left 6/4/2021     US BREAST NEEDLE BIOPSY LEFT 6/4/2021 [facility] ULTRASOUND               Allergies   Allergen Reactions    Codeine Hives, Nausea And Vomiting and Nausea Only    Oxycodone-Acetaminophen Hives       bilat eyes; no rashes    Percocet [Oxycodone-Acetaminophen] Hives       bilat eyes; no rashes    Simvastatin Rash      Current Facility-Administered Medications          Current Outpatient Medications   Medication Sig Dispense Refill    pantoprazole (PROTONIX) 40 MG tablet TAKE ONE TABLET BY MOUTH DAILY 90 tablet 1    escitalopram (LEXAPRO) 10 MG tablet TAKE 1/2 TABLET BY MOUTH DAILY 45 tablet 1    buPROPion (WELLBUTRIN XL) 150 MG extended release tablet TAKE ONE TABLET BY MOUTH DAILY 90 tablet 1    LORazepam (ATIVAN) 0.5 MG tablet TAKE ONE TABLET BY MOUTH DAILY AS NEEDED FOR ANXIETY FOR UP TO 60 DAYS 30 tablet 1    bimatoprost 0.03 % SOLN Place one drop on applicator and apply evenly along the skin of the upper eyelid at base of eyelashes once daily at bedtime; repeat procedure for second eye (use a clean applicator) 1 each 1    hydroCHLOROthiazide (HYDRODIURIL) 25 MG tablet Take one tablet by mouth daily 90 tablet 1      No current facility-administered medications for this visit.          Social History               Socioeconomic History    Marital status:        Spouse name: Not on file    Number of children: Not on file    Years of education: Not on file    Highest education level: Not on file Occupational History    Not on file   Tobacco Use    Smoking status: Never Smoker    Smokeless tobacco: Never Used   Vaping Use    Vaping Use: Never used   Substance and Sexual Activity    Alcohol use: Yes       Comment: social    Drug use: No    Sexual activity: Yes       Partners: Male   Other Topics Concern    Not on file   Social History Narrative    Not on file      Social Determinants of Health          Financial Resource Strain: Low Risk    Difficulty of Paying Living Expenses: Not hard at all   Food Insecurity: No Food Insecurity    Worried About [address] in the Last Year: Never true    [address] in the Last Year: Never true   Transportation Needs:    Lack of Transportation (Medical): Not on file    Lack of Transportation (Non-Medical):  Not on file   Physical Activity:    Days of Exercise per Week: Not on file    Minutes of Exercise per Session: Not on file   Stress:    Feeling of Stress : Not on file   Social Connections:    Frequency of Communication with Friends and Family: Not on file    Frequency of Social Gatherings with Friends and Family: Not on file    Attends Confucianist Services: Not on file    Active Member of Clubs or Organizations: Not on file    Attends Club or Organization Meetings: Not on file    Marital Status: Not on file   Intimate Partner Violence:    Fear of Current or Ex-Partner: Not on file    Emotionally Abused: Not on file    Physically Abused: Not on file    Sexually Abused: Not on file   Housing Stability:    Unable to Pay for Housing in the Last Year: Not on file    Number of [place] in the Last Year: Not on file    Unstable Housing in the Last Year: Not on file         Family History         Family History   Problem Relation Age of Onset    Uterine Cancer Paternal Grandmother      Colon Cancer Paternal Grandmother 72    Heart Disease Father      Cancer Maternal Grandmother           bone and breast    Heart Disease Maternal Grandfather           Review of systems is otherwise negative. BP (!) 155/85   Pulse 75   Resp 20   Ht 5' 3\" (1.6 m)   Wt 168 lb (76.2 kg)   LMP 07/29/2015 (Approximate)   BMI 29.76 kg/m²         Objective:   Physical Exam  Vitals and nursing note reviewed. Exam conducted with a chaperone present. Constitutional:       Appearance: Normal appearance. She is well-developed. She is not diaphoretic. HENT:     Head: Normocephalic and atraumatic. Nose: Nose normal.   Eyes:     Extraocular Movements: Extraocular movements intact. Conjunctiva/sclera: Conjunctivae normal.      Pupils: Pupils are equal, round, and reactive to light. Comments: Bilateral upper eyelid skin excess with ptosis and blepharochalasis. She has heavy upper eyelids that overhanging the lid margin contributing to her visual field deficits especially on the lateral and superior visual fields. Visual field testing confirms the visual field deficits with 100% improvement after taping. Neck:     Vascular: No JVD. Trachea: No tracheal deviation. Cardiovascular:     Rate and Rhythm: Normal rate. Pulmonary:     Effort: Pulmonary effort is normal. No respiratory distress. Breath sounds: No wheezing. Abdominal:      General: There is no distension. Palpations: Abdomen is soft. Musculoskeletal:         General: No tenderness. Normal range of motion. Cervical back: Normal range of motion. Lymphadenopathy:     Cervical: No cervical adenopathy. Skin:     General: Skin is warm and dry. Coloration: Skin is not pale. Findings: No erythema or rash. Nails: There is no clubbing. Neurological:     Mental Status: She is alert and oriented to person, place, and time. Cranial Nerves: No cranial nerve deficit. Psychiatric:         Mood and Affect: Mood normal.         Speech: Speech normal.         Behavior: Behavior normal.         Thought Content:  Thought content normal.         Judgment: Judgment normal. Assessment:        Diagnosis Orders   1. Ptosis of both eyelids     2. Functional visual field loss     3. Dermatochalasis of both upper and lower eyelid of right eye     4. Dermatochalasis of both upper and lower eyelid of left eye                       Plan:      I do feel the patient would benefit from bilateral upper eyelid blepharoplasties with resection of excess skin. I discussed the procedure with the patient at great length. The patient understands the incision placement, expected outcomes and the risks of the procedure which were discussed at great length. We will send off a letter to the [employer] company for predetermination. The ICD-10 DX are as follows:     H02.403  H53.40  H02.831  H02.834     The proposed CPT [place] for this procedure is 94611-98 for bilateral upper eyelid blepharoplasty with excessive skin weighing down the lid. The patient was evaluated and examined with my nurse in the room at all times. Portions of this note were transcribed using Dragon voice recognition technology and as such may reflect some variations in voice recognition. COVID-19 precautions were taken throughout the entire office visit. Patient was screened for COVID-19 symptoms and temperature was taken prior to coming back to the exam room. A mask as well as gloves was worn throughout the entire office visit and distancing maintained as much as possible in between the physical examination periods.      Aniyah Elias MD

## 2022-08-04 NOTE — ANESTHESIA POSTPROCEDURE EVALUATION
Department of Anesthesiology  Postprocedure Note    Patient: Nahid Ryder  MRN: [id number]  YOB: 1965  Date of evaluation: 8/4/2022      Procedure Summary     Date: 08/04/22 Room / Location: [facility] 04 / [address] [place] [address]    Anesthesia Start: 0198 Anesthesia Stop: 2772    Procedure: BILATERAL UPPER EYE BLEPHAROPLASTY WITH RESECTION OF EXCESS SKIN (Bilateral: Eye) Diagnosis:       Ptosis of both eyelids      Visual field defect of both eyes      Dermatochalasis of both upper eyelids      (Ptosis of both eyelids [H02.403])    Surgeons: Miki Weber MD Responsible Provider: Mary Cunningham MD    Anesthesia Type: general ASA Status: 2          Anesthesia Type: No value filed.     Han Phase I: Han Score: 10    Han Phase II: Han Score: 10      Anesthesia Post Evaluation    Patient location during evaluation: PACU  Patient participation: complete - patient participated  Level of consciousness: awake and alert  Airway patency: patent  Nausea & Vomiting: no nausea and no vomiting  Complications: no  Cardiovascular status: hemodynamically stable  Respiratory status: room air and spontaneous ventilation  Hydration status: euvolemic  Multimodal analgesia pain management approach

## 2022-08-04 NOTE — OP NOTE
Operative Note      Patient: Ronda Hurtado  YOB: 1965  MRN: [id number]    Date of Procedure: 8/4/2022    Pre-Op Diagnosis: Ptosis of both eyelids [H02.403]    Post-Op Diagnosis: Same       Procedure(s):  BILATERAL UPPER EYE BLEPHAROPLASTY WITH RESECTION OF EXCESS SKIN    Surgeon(s): Anish Yap MD    Assistant:   * No surgical staff found *    Anesthesia: General    Estimated Blood Loss (mL): Minimal    Complications: None    Specimens:   * No specimens in log *    Implants:  * No implants in log *      Drains: * No LDAs found *    Findings: nice shape and symmetry post op    Detailed Description of Procedure:     INDICATIONS:  This is a 49 y.o. female who presents for bilateral upper eyelid blepharoplasty due to visual field deficits and for rejuvenation of the upper eyelids. The patient complains particularly of redundant upper eyelid skin and fullness of the upper eyelids with visual field deficits. The risks and benefits of the procedure were discussed with the patient at great length including but not limited to infection, bleeding, scar formation, recurrent ptosis, corneal abrasion, hollowing, asymmetry and the need for reoperation and wishes to proceed. DESCRIPTION OF OPERATION: Pt was brought into the operating room and placed under general anesthesia. The patient was then sterilely prepped and draped in the usual fashion. Bilateral corneal shields filled with lacrilube were placed against each of the corneas. Next both of the upper eyelids were injected with 0.5% lidocaine with 1:200,000 epinephrine. This was allowed to work for approximately 5 minutes and then an elliptical incision was made through the upper eyelid skin bilaterally , following the pre-operative markings. The skin was completely excised and hemostasis was obtained with needle tip electrocautery. Next a super cut scissors was used to excise a small strip of orbicularis oculi muscle.   Hemostasis was once again obtained with bovie cautery. The  orbital septum was then divided, exposing both the medial and lateral fat pads. Fat was teased from each of these compartments and excised with electrocautery. The was repeated on the opposite side. The wounds bilaterally were then rrigated and hemostasis meticulously confirmed. The incisions were closed using a subcuticular closure of 5-0 prolene. The proline suture ends were tagged with benzoin and steri-strips, and the corneal protectors were removed. We irrigated the eyes with BSS and dressed the incisions with ice cold saline soaked gauze. The patient tolerated the procedure well and was taken to post op recovery in stable condition.      Electronically signed by Ted Wong MD on 8/4/2022 at 9:32 AM

## 2022-08-04 NOTE — ANESTHESIA PRE PROCEDURE
 0.9 % sodium chloride infusion   IntraVENous PRN Renny Esparza MD        ceFAZolin (ANCEF) IVPB             gentian violet 1 % topical solution             balanced salts plus (BSS) ophthalmic solution             lidocaine-EPINEPHrine 1 %-1:[id number] injection                Allergies: Allergies   Allergen Reactions    Codeine Hives, Nausea And Vomiting and Nausea Only    Oxycodone-Acetaminophen Hives     bilat eyes; no rashes    Percocet [Oxycodone-Acetaminophen] Hives     bilat eyes; no rashes    Simvastatin Rash       Problem List:    Patient Active Problem List   Diagnosis Code    Anxiety F41.9    Depression F32. A    GERD (gastroesophageal reflux disease) K21.9    Hyperlipidemia E78.5    Anemia D64.9    Endometriosis N80.9    ASCUS (atypical squamous cells of undetermined significance) on Pap smear [id number]    Neoplasm of uncertain behavior of skin D48.5    Essential hypertension I10    Encounter for cosmetic surgery Z41.1    Hypertrophy of breast N62    Bilateral 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 1XXA    Rash, skin R21    Ptosis of both eyelids H02.403    Functional visual field loss H53.40    Dermatochalasis of both upper and lower eyelid of right eye H02.831, H02.832    Dermatochalasis of both upper and lower eyelid of left eye H02.834, Z29.606       Past Medical History:        Diagnosis Date    Anxiety     ASCUS (atypical squamous cells of undetermined significance) on Pap smear 2006    Depression     Endometriosis     Found on scope 8/22/12    GERD (gastroesophageal reflux disease)     HTN (hypertension)     Hyperlipidemia     Iron deficiency anemia     Menometrorrhagia     PONV (postoperative nausea and vomiting)     severe after liposuction surgery    Sinus problem        Past Surgical History:        Procedure Laterality Date    BREAST REDUCTION SURGERY Bilateral 07/14/2020    BREAST REDUCTION SURGERY Bilateral 7/14/2020    BREAST MAMMOPLASTY REDUCTION performed by Kaleta Oppenheim, MD at [facility] [address]  2004    wnl    COLONOSCOPY N/A 8/10/2021    COLONOSCOPY WITH BIOPSY performed by Renetta Medina MD at [address] [place] [address]  5/18/2012    due to menorrhagia    LAPAROSCOPY  8/22/12    Dx-->Operative, ablation of endometriosis, drainage of Rt Ovarian cyst, pelvic lavage    LEEP  2006    ecc    LIPOSUCTION  10/18/2018    abdomen , flanks, lower back    [place] OFFICE/OUTPT VISIT,PROCEDURE ONLY N/A 10/18/2018    COSMETIC - LIPOSUCTION ABDOMEN, FLANKS, LOWER BACK SUCTION performed by Kaleta Oppenheim, MD at [address] Bilateral 6/6/2001    cheeks---intradermal nevus    UPPER GASTROINTESTINAL ENDOSCOPY      UPPER GASTROINTESTINAL ENDOSCOPY  2004    gastritis    UPPER GASTROINTESTINAL ENDOSCOPY N/A 8/10/2021    EGD BIOPSY performed by Renetta Medina MD at [address] Left 6/4/2021    US BREAST NEEDLE BIOPSY LEFT 6/4/2021 [facility] ULTRASOUND       Social History:    Social History     Tobacco Use    Smoking status: Never    Smokeless tobacco: Never   Substance Use Topics    Alcohol use: Yes     Comment: social                                Counseling given: Not Answered      Vital Signs (Current):   Vitals:    08/04/22 0755   BP: (!) 154/87   Pulse: 74   Resp: 16   Temp: 98 °F (36.7 °C)   SpO2: 98%   Weight: 155 lb (70.3 kg)   Height: 5' 3\" (1.6 m)                                              BP Readings from Last 3 Encounters:   08/04/22 (!) 154/87   07/28/22 (!) 132/90   07/21/22 (!) 142/107       NPO Status: Time of last liquid consumption: 1800                        Time of last solid consumption: 1800                        Date of last liquid consumption: 08/03/22                        Date of last solid food consumption: 08/03/22    BMI:   Wt Readings from Last 3 Encounters:   08/04/22 155 lb (70.3 kg)   07/28/22 168 lb (76.2 kg)   07/21/22 155 lb (70.3 kg)     Body mass index is 27.46 kg/m². CBC:   Lab Results   Component Value Date/Time    WBC 3.8 07/21/2022 08:45 AM    RBC 4.23 07/21/2022 08:45 AM    RBC 3.70 05/11/2012 09:58 AM    HGB 12.3 07/21/2022 08:45 AM    HCT 36.4 07/21/2022 08:45 AM    MCV 85.9 07/21/2022 08:45 AM    RDW 14.7 07/21/2022 08:45 AM     07/21/2022 08:45 AM     05/11/2012 09:58 AM       CMP:   Lab Results   Component Value Date/Time     07/21/2022 08:45 AM    K 4.0 07/21/2022 08:45 AM     07/21/2022 08:45 AM    CO2 29 07/21/2022 08:45 AM    BUN 14 07/21/2022 08:45 AM    CREATININE 0.61 07/21/2022 08:45 AM    GFRAA >60 07/21/2022 08:45 AM    LABGLOM >60 07/21/2022 08:45 AM    GLUCOSE 101 07/21/2022 08:45 AM    GLUCOSE 91 05/11/2012 09:58 AM    PROT 7.8 09/10/2021 10:27 AM    CALCIUM 9.9 07/21/2022 08:45 AM    BILITOT 0.45 09/10/2021 10:27 AM    ALKPHOS 70 09/10/2021 10:27 AM    AST 18 09/10/2021 10:27 AM    ALT 17 09/10/2021 10:27 AM       POC Tests: No results for input(s): POCGLU, POCNA, POCK, POCCL, POCBUN, POCHEMO, POCHCT in the last 72 hours.     Coags:   Lab Results   Component Value Date/Time    PROTIME 10.6 05/11/2012 09:58 AM    INR 1.0 05/11/2012 09:58 AM    APTT 27.0 05/11/2012 09:58 AM       HCG (If Applicable):   Lab Results   Component Value Date    PREGTESTUR NEGATIVE 08/13/2012    HCG NEGATIVE 08/22/2012    HCGQUANT <2 05/11/2012        ABGs: No results found for: PHART, PO2ART, ZAW6IPK, IVS6OIH, BEART, F7FBFNNT     Type & Screen (If Applicable):  No results found for: LABABO, LABRH    Drug/Infectious Status (If Applicable):  No results found for: HIV, HEPCAB    COVID-19 Screening (If Applicable):   Lab Results   Component Value Date/Time    COVID19 DETECTED 11/13/2020 12:20 AM    COVID19 Not Detected 07/12/2020 07:40 AM           Anesthesia Evaluation  Patient summary reviewed and Nursing notes reviewed   history of anesthetic complications: PONV.  Airway: Mallampati: II  TM distance: >3 FB   Neck ROM: full  Mouth opening: > = 3 FB   Dental: normal exam         Pulmonary:Negative Pulmonary ROS and normal exam                               Cardiovascular:    (+) hypertension:,                   Neuro/Psych:   Negative Neuro/Psych ROS              GI/Hepatic/Renal:   (+) GERD: well controlled,           Endo/Other:    (+) malignancy/cancer. ROS comment: -SKIN CANCER  -NPO AFTER MIDNIGHT  -ALLERGIES - CODEINE, PERCOCET, SIMVASTATIN Abdominal:             Vascular:           ROS comment: -ANEMIA. Other Findings:           Anesthesia Plan      general     ASA 2     (GETA)  Induction: intravenous. MIPS: Postoperative opioids intended and Prophylactic antiemetics administered. Anesthetic plan and risks discussed with patient. Plan discussed with CRNA.     Attending anesthesiologist reviewed and agrees with Alba Nieto MD   8/4/2022

## 2022-11-15 DIAGNOSIS — Z76.0 MEDICATION REFILL: ICD-10-CM

## 2022-11-15 NOTE — TELEPHONE ENCOUNTER
Last visit: 07/28/2022  Last Med refill: 05/24/2022  Does patient have enough medication for 72 hours: Yes    Next Visit Date:  Future Appointments   Date Time Provider Tania Lagos   11/28/2022  8:15 AM LUPE El CNP [facility] [address] Maintenance   Topic Date Due    HIV screen  Never done    Hepatitis C screen  Never done    DTaP/Tdap/Td vaccine (1 - Tdap) Never done    Shingles vaccine (1 of 2) Never done    Diabetes screen  05/22/2018    COVID-19 Vaccine (4 - Booster for Moderna series) 03/11/2022    Flu vaccine (1) Never done    Depression Monitoring  05/24/2023    Breast cancer screen  11/29/2023    Cervical cancer screen  05/04/2026    Lipids  09/10/2026    Colorectal Cancer Screen  08/10/2031    Hepatitis A vaccine  Aged Out    Hib vaccine  Aged Out    Meningococcal (ACWY) vaccine  Aged Out    Pneumococcal 0-64 years Vaccine  Aged Out       Hemoglobin A1C (%)   Date Value   05/22/2015 5.0             ( goal A1C is < 7)   Microalb/Crt.  Ratio (mcg/mg creat)   Date Value   05/22/2015 19     LDL Cholesterol (mg/dL)   Date Value   09/10/2021 201 (H)   05/22/2015 135 (H)     LDL Calculated (mg/dL)   Date Value   12/14/2016 138       (goal LDL is <100)   AST (U/L)   Date Value   09/10/2021 18     ALT (U/L)   Date Value   09/10/2021 17     BUN (mg/dL)   Date Value   07/21/2022 14     BP Readings from Last 3 Encounters:   08/10/22 127/80   08/04/22 (!) 155/92   07/28/22 (!) 132/90          (goal 120/80)    All Future Testing planned in [facility]  Lab Frequency Next Occurrence               Patient Active Problem List:     Anxiety     Depression     GERD (gastroesophageal reflux disease)     Hyperlipidemia     Anemia     Endometriosis     ASCUS (atypical squamous cells of undetermined significance) on Pap smear     Neoplasm of uncertain behavior of skin     Essential hypertension     Encounter for cosmetic surgery     Hypertrophy of breast     Bilateral mastodynia     Chronic bilateral thoracic back pain     Acute cervical myofascial strain     Rash, skin     Ptosis of both eyelids     Functional visual field loss     Dermatochalasis of both upper and lower eyelid of right eye     Dermatochalasis of both upper and lower eyelid of left eye

## 2022-11-16 RX ORDER — ESCITALOPRAM OXALATE 10 MG/1
TABLET ORAL
Qty: 45 TABLET | Refills: 1 | Status: SHIPPED | OUTPATIENT
Start: 2022-11-16

## 2022-11-23 DIAGNOSIS — Z76.0 MEDICATION REFILL: ICD-10-CM

## 2022-11-23 NOTE — TELEPHONE ENCOUNTER
Last visit: 05/24/2022  Last Med refill: 05/24/2022  Does patient have enough medication for 72 hours: Yes    Next Visit Date:  Future Appointments   Date Time Provider Tania Lagos   11/28/2022  8:15 AM LUPE Cho CNP [facility] [address] Maintenance   Topic Date Due    HIV screen  Never done    Hepatitis C screen  Never done    DTaP/Tdap/Td vaccine (1 - Tdap) Never done    Shingles vaccine (1 of 2) Never done    Diabetes screen  05/22/2018    COVID-19 Vaccine (4 - Booster for Moderna series) 03/11/2022    Flu vaccine (1) Never done    Depression Monitoring  05/24/2023    Breast cancer screen  11/29/2023    Cervical cancer screen  05/04/2026    Lipids  09/10/2026    Colorectal Cancer Screen  08/10/2031    Hepatitis A vaccine  Aged Out    Hib vaccine  Aged Out    Meningococcal (ACWY) vaccine  Aged Out    Pneumococcal 0-64 years Vaccine  Aged Out       Hemoglobin A1C (%)   Date Value   05/22/2015 5.0             ( goal A1C is < 7)   Microalb/Crt.  Ratio (mcg/mg creat)   Date Value   05/22/2015 19     LDL Cholesterol (mg/dL)   Date Value   09/10/2021 201 (H)   05/22/2015 135 (H)     LDL Calculated (mg/dL)   Date Value   12/14/2016 138       (goal LDL is <100)   AST (U/L)   Date Value   09/10/2021 18     ALT (U/L)   Date Value   09/10/2021 17     BUN (mg/dL)   Date Value   07/21/2022 14     BP Readings from Last 3 Encounters:   08/10/22 127/80   08/04/22 (!) 155/92   07/28/22 (!) 132/90          (goal 120/80)    All Future Testing planned in [facility]  Lab Frequency Next Occurrence               Patient Active Problem List:     Anxiety     Depression     GERD (gastroesophageal reflux disease)     Hyperlipidemia     Anemia     Endometriosis     ASCUS (atypical squamous cells of undetermined significance) on Pap smear     Neoplasm of uncertain behavior of skin     Essential hypertension     Encounter for cosmetic surgery     Hypertrophy of breast     Bilateral mastodynia     Chronic bilateral thoracic back pain     Acute cervical myofascial strain     Rash, skin     Ptosis of both eyelids     Functional visual field loss     Dermatochalasis of both upper and lower eyelid of right eye     Dermatochalasis of both upper and lower eyelid of left eye

## 2022-11-25 RX ORDER — PANTOPRAZOLE SODIUM 40 MG/1
TABLET, DELAYED RELEASE ORAL
Qty: 90 TABLET | Refills: 1 | Status: SHIPPED | OUTPATIENT
Start: 2022-11-25

## 2022-11-28 ENCOUNTER — HOSPITAL ENCOUNTER (OUTPATIENT)
Age: 57
Setting detail: SPECIMEN
Discharge: HOME | End: 2022-11-28

## 2022-11-28 ENCOUNTER — OFFICE VISIT (OUTPATIENT)
Dept: FAMILY MEDICINE CLINIC | Age: 57
End: 2022-11-28
Payer: COMMERCIAL

## 2022-11-28 VITALS
SYSTOLIC BLOOD PRESSURE: 132 MMHG | BODY MASS INDEX: 29.94 KG/M2 | HEART RATE: 62 BPM | WEIGHT: 169 LBS | OXYGEN SATURATION: 100 % | DIASTOLIC BLOOD PRESSURE: 84 MMHG

## 2022-11-28 DIAGNOSIS — E78.5 HYPERLIPIDEMIA, UNSPECIFIED HYPERLIPIDEMIA TYPE: ICD-10-CM

## 2022-11-28 DIAGNOSIS — Z86.39 HISTORY OF ELEVATED GLUCOSE: ICD-10-CM

## 2022-11-28 DIAGNOSIS — I10 ESSENTIAL HYPERTENSION: ICD-10-CM

## 2022-11-28 DIAGNOSIS — Z23 NEED FOR TDAP VACCINATION: ICD-10-CM

## 2022-11-28 DIAGNOSIS — R41.840 ATTENTION DEFICIT: ICD-10-CM

## 2022-11-28 DIAGNOSIS — I10 ESSENTIAL HYPERTENSION: Primary | ICD-10-CM

## 2022-11-28 DIAGNOSIS — Z76.0 MEDICATION REFILL: ICD-10-CM

## 2022-11-28 DIAGNOSIS — F41.9 CHRONIC ANXIETY: ICD-10-CM

## 2022-11-28 LAB
ALBUMIN SERPL-MCNC: 4.5 G/DL (ref 3.5–5.2)
ALBUMIN/GLOBULIN RATIO: 1.6 (ref 1–2.5)
ALP BLD-CCNC: 68 U/L (ref 35–104)
ALT SERPL-CCNC: 18 U/L (ref 5–33)
ANION GAP SERPL CALCULATED.3IONS-SCNC: 14 MMOL/L (ref 9–17)
AST SERPL-CCNC: 19 U/L
BILIRUB SERPL-MCNC: 0.4 MG/DL (ref 0.3–1.2)
BUN BLDV-MCNC: 12 MG/DL (ref 6–20)
CALCIUM SERPL-MCNC: 9.8 MG/DL (ref 8.6–10.4)
CHLORIDE BLD-SCNC: 102 MMOL/L (ref 98–107)
CHOLESTEROL, FASTING: 262 MG/DL
CHOLESTEROL/HDL RATIO: 4.9
CO2: 26 MMOL/L (ref 20–31)
CREAT SERPL-MCNC: 0.66 MG/DL (ref 0.5–0.9)
GFR SERPL CREATININE-BSD FRML MDRD: >60 ML/MIN/1.73M2
GLUCOSE BLD-MCNC: 103 MG/DL (ref 70–99)
HCT VFR BLD CALC: 41.9 % (ref 36.3–47.1)
HDLC SERPL-MCNC: 54 MG/DL
HEMOGLOBIN: 12.8 G/DL (ref 11.9–15.1)
LDL CHOLESTEROL: 159 MG/DL (ref 0–130)
MCH RBC QN AUTO: 28.6 PG (ref 25.2–33.5)
MCHC RBC AUTO-ENTMCNC: 30.5 G/DL (ref 28.4–34.8)
MCV RBC AUTO: 93.5 FL (ref 82.6–102.9)
NRBC AUTOMATED: 0 PER 100 WBC
PDW BLD-RTO: 13.5 % (ref 11.8–14.4)
PLATELET # BLD: 210 K/UL (ref 138–453)
PMV BLD AUTO: 11.4 FL (ref 8.1–13.5)
POTASSIUM SERPL-SCNC: 4.4 MMOL/L (ref 3.7–5.3)
RBC # BLD: 4.48 M/UL (ref 3.95–5.11)
SODIUM BLD-SCNC: 142 MMOL/L (ref 135–144)
TOTAL PROTEIN: 7.3 G/DL (ref 6.4–8.3)
TRIGLYCERIDE, FASTING: 243 MG/DL
TSH SERPL DL<=0.05 MIU/L-ACNC: 1.55 UIU/ML (ref 0.3–5)
WBC # BLD: 4.9 K/UL (ref 3.5–11.3)

## 2022-11-28 PROCEDURE — 3074F SYST BP LT 130 MM HG: CPT | Performed by: NURSE PRACTITIONER

## 2022-11-28 PROCEDURE — 90715 TDAP VACCINE 7 YRS/> IM: CPT | Performed by: NURSE PRACTITIONER

## 2022-11-28 PROCEDURE — 3078F DIAST BP <80 MM HG: CPT | Performed by: NURSE PRACTITIONER

## 2022-11-28 PROCEDURE — 90471 IMMUNIZATION ADMIN: CPT | Performed by: NURSE PRACTITIONER

## 2022-11-28 PROCEDURE — 99214 OFFICE O/P EST MOD 30 MIN: CPT | Performed by: NURSE PRACTITIONER

## 2022-11-28 RX ORDER — HYDROCHLOROTHIAZIDE 25 MG/1
TABLET ORAL
Qty: 90 TABLET | Refills: 1 | Status: SHIPPED | OUTPATIENT
Start: 2022-11-28

## 2022-11-28 RX ORDER — BUPROPION HYDROCHLORIDE 150 MG/1
TABLET ORAL
Qty: 90 TABLET | Refills: 1 | Status: SHIPPED | OUTPATIENT
Start: 2022-11-28

## 2022-11-28 RX ORDER — LISINOPRIL 10 MG/1
10 TABLET ORAL DAILY
Qty: 90 TABLET | Refills: 1 | Status: CANCELLED | OUTPATIENT
Start: 2022-11-28

## 2022-11-28 RX ORDER — LORAZEPAM 0.5 MG/1
TABLET ORAL
Qty: 30 TABLET | Refills: 1 | Status: SHIPPED | OUTPATIENT
Start: 2022-11-28 | End: 2023-06-24

## 2022-11-28 ASSESSMENT — ENCOUNTER SYMPTOMS
SHORTNESS OF BREATH: 0
COUGH: 0

## 2022-11-28 NOTE — PROGRESS NOTES
Ronda Hurtado (:  1965) is a 49 y.o. female,Established patient, here for evaluation of the following chief complaint(s):  Hypertension and 6 Month Follow-Up         ASSESSMENT/PLAN:  1. Essential hypertension  -     CBC; Future  -     Comprehensive Metabolic Panel; Future  -     TSH; Future  2. Hyperlipidemia, unspecified hyperlipidemia type  -     Lipid, Fasting; Future  3. History of elevated glucose  -     Hemoglobin A1C; Future  4. Need for Tdap vaccination  -     Tdap, BOOSTRIX, (age 8 yrs+), IM  5. Medication refill  -     buPROPion (WELLBUTRIN XL) 150 MG extended release tablet; TAKE ONE TABLET BY MOUTH DAILY, Disp-90 tablet, R-1Normal  -     hydroCHLOROthiazide (HYDRODIURIL) 25 MG tablet; Take one tablet by mouth daily, Disp-90 tablet, R-1Normal  6. Chronic anxiety  -     LORazepam (ATIVAN) 0.5 MG tablet; TAKE ONE TABLET BY MOUTH DAILY AS NEEDED FOR ANXIETY FOR UP TO 60 DAYS, Disp-30 tablet, R-1Normal  -     Robert Tompkins MD, Psychiatry, [place]  7. Attention deficit  -     Robert Tompkins MD, Psychiatry, [place]      No follow-ups on file. Subjective   SUBJECTIVE/OBJECTIVE:  Hypertension  Pertinent negatives include no chest pain, palpitations or shortness of breath. Pt would like referral to be tested for adhd. Review of Systems   Constitutional:  Negative for chills and fever. Respiratory:  Negative for cough and shortness of breath. Cardiovascular:  Negative for chest pain, palpitations and leg swelling. Objective   Vitals:    22 0813   BP: 132/84   Pulse: 62   SpO2: 100%     Wt Readings from Last 3 Encounters:   22 169 lb (76.7 kg)   22 155 lb (70.3 kg)   22 155 lb (70.3 kg)       Physical Exam  Constitutional:       Appearance: She is well-developed. HENT:      Right Ear: External ear normal.      Left Ear: External ear normal.      Nose: Nose normal.   Cardiovascular:      Rate and Rhythm: Normal rate and regular rhythm.       Heart sounds: Normal heart sounds, S1 normal and S2 normal.   Pulmonary:      Effort: Pulmonary effort is normal. No respiratory distress. Breath sounds: Normal breath sounds. Musculoskeletal:         General: No deformity. Normal range of motion. Cervical back: Full passive range of motion without pain and normal range of motion. Skin:     General: Skin is warm and dry. Neurological:      Mental Status: She is alert and oriented to person, place, and time. An electronic signature was used to authenticate this note.     --Ayo Leary, LUPE - CNP

## 2022-11-29 LAB
ESTIMATED AVERAGE GLUCOSE: 108 MG/DL
HBA1C MFR BLD: 5.4 % (ref 4–6)

## 2022-11-29 NOTE — RESULT ENCOUNTER NOTE
Labs ok- a1c and triglycerides have raised, can cut back on carbohydrates, sugars, alcohol if using.    The 10-year ASCVD risk score (Ned BARRAZA, et al., 2019) is: 3.5%    Values used to calculate the score:      Age: 49 years      Sex: Female      Is Non- : No      Diabetic: No      Tobacco smoker: No      Systolic Blood Pressure: 982 mmHg      Is BP treated: Yes      HDL Cholesterol: 54 mg/dL      Total Cholesterol: 262 mg/dL

## 2023-01-24 DIAGNOSIS — Z76.0 MEDICATION REFILL: ICD-10-CM

## 2023-01-25 RX ORDER — HYDROCHLOROTHIAZIDE 25 MG/1
TABLET ORAL
Qty: 90 TABLET | Refills: 1 | OUTPATIENT
Start: 2023-01-25

## 2023-04-25 DIAGNOSIS — Z76.0 MEDICATION REFILL: ICD-10-CM

## 2023-04-25 RX ORDER — ESCITALOPRAM OXALATE 10 MG/1
TABLET ORAL
Qty: 45 TABLET | Refills: 1 | Status: SHIPPED | OUTPATIENT
Start: 2023-04-25

## 2023-04-25 NOTE — TELEPHONE ENCOUNTER
Last visit: 11/28/22  Last Med refill: 11/16/22  Does patient have enough medication for 72 hours: Yes    Next Visit Date:  Future Appointments   Date Time Provider Tania Lagos   5/30/2023  9:00 AM Tod Silvius, APRN - CNP [facility]   6/5/2023  3:45 PM ERYN Pack MD [facility] None       Health Maintenance   Topic Date Due    Shingles vaccine (1 of 2) Never done    COVID-19 Vaccine (4 - Booster for Moderna series) 03/11/2022    Depression Monitoring  05/24/2023    Flu vaccine (Season Ended) 11/28/2023 (Originally 8/1/2023)    Hepatitis C screen  11/28/2023 (Originally 12/13/1985)    HIV screen  11/28/2023 (Originally 12/13/1982)    Breast cancer screen  11/29/2023    Diabetes screen  11/28/2025    Cervical cancer screen  05/04/2026    Lipids  11/28/2027    Colorectal Cancer Screen  08/10/2031    DTaP/Tdap/Td vaccine (2 - Td or Tdap) 11/28/2032    Hepatitis A vaccine  Aged Out    Hib vaccine  Aged Out    Meningococcal (ACWY) vaccine  Aged Out    Pneumococcal 0-64 years Vaccine  Aged Out       Hemoglobin A1C (%)   Date Value   11/28/2022 5.4   05/22/2015 5.0             ( goal A1C is < 7)   Microalb/Crt.  Ratio (mcg/mg creat)   Date Value   05/22/2015 19     LDL Cholesterol (mg/dL)   Date Value   11/28/2022 159 (H)   09/10/2021 201 (H)     LDL Calculated (mg/dL)   Date Value   12/14/2016 138       (goal LDL is <100)   AST (U/L)   Date Value   11/28/2022 19     ALT (U/L)   Date Value   11/28/2022 18     BUN (mg/dL)   Date Value   11/28/2022 12     BP Readings from Last 3 Encounters:   11/28/22 132/84   08/10/22 127/80   08/04/22 (!) 155/92          (goal 120/80)    All Future Testing planned in [facility]  Lab Frequency Next Occurrence               Patient Active Problem List:     Anxiety     Depression     GERD (gastroesophageal reflux disease)     Hyperlipidemia     Anemia     Endometriosis     ASCUS (atypical squamous cells of undetermined significance) on Pap smear     Neoplasm

## 2023-05-24 DIAGNOSIS — Z76.0 MEDICATION REFILL: ICD-10-CM

## 2023-05-24 RX ORDER — PANTOPRAZOLE SODIUM 40 MG/1
TABLET, DELAYED RELEASE ORAL
Qty: 90 TABLET | Refills: 1 | Status: SHIPPED | OUTPATIENT
Start: 2023-05-24

## 2023-05-24 NOTE — TELEPHONE ENCOUNTER
LAST VISIT:   11/28/2022     Future Appointments   Date Time Provider Tania Morai   5/30/2023  9:00 AM LUPE Parsons - CNP [facility]   6/5/2023  3:45 PM MD Mohini Casillas

## 2023-05-30 ENCOUNTER — OFFICE VISIT (OUTPATIENT)
Dept: FAMILY MEDICINE CLINIC | Age: 58
End: 2023-05-30
Payer: COMMERCIAL

## 2023-05-30 VITALS
DIASTOLIC BLOOD PRESSURE: 84 MMHG | OXYGEN SATURATION: 95 % | WEIGHT: 166 LBS | HEART RATE: 74 BPM | SYSTOLIC BLOOD PRESSURE: 132 MMHG | BODY MASS INDEX: 29.41 KG/M2

## 2023-05-30 DIAGNOSIS — Z76.0 MEDICATION REFILL: ICD-10-CM

## 2023-05-30 DIAGNOSIS — F41.9 CHRONIC ANXIETY: ICD-10-CM

## 2023-05-30 DIAGNOSIS — L65.9 THINNING HAIR: Primary | ICD-10-CM

## 2023-05-30 PROCEDURE — 3079F DIAST BP 80-89 MM HG: CPT | Performed by: NURSE PRACTITIONER

## 2023-05-30 PROCEDURE — 3075F SYST BP GE 130 - 139MM HG: CPT | Performed by: NURSE PRACTITIONER

## 2023-05-30 PROCEDURE — 99214 OFFICE O/P EST MOD 30 MIN: CPT | Performed by: NURSE PRACTITIONER

## 2023-05-30 RX ORDER — HYDROCHLOROTHIAZIDE 25 MG/1
TABLET ORAL
Qty: 90 TABLET | Refills: 1 | Status: SHIPPED | OUTPATIENT
Start: 2023-05-30

## 2023-05-30 RX ORDER — BUPROPION HYDROCHLORIDE 150 MG/1
TABLET ORAL
Qty: 90 TABLET | Refills: 1 | Status: SHIPPED | OUTPATIENT
Start: 2023-05-30

## 2023-05-30 RX ORDER — BIMATOPROST 3 UG/ML
SOLUTION TOPICAL
Qty: 1 EACH | Refills: 1 | Status: SHIPPED | OUTPATIENT
Start: 2023-05-30

## 2023-05-30 RX ORDER — LORAZEPAM 0.5 MG/1
TABLET ORAL
Qty: 30 TABLET | Refills: 1 | Status: SHIPPED | OUTPATIENT
Start: 2023-05-30 | End: 2023-12-24

## 2023-05-30 SDOH — ECONOMIC STABILITY: FOOD INSECURITY: WITHIN THE PAST 12 MONTHS, YOU WORRIED THAT YOUR FOOD WOULD RUN OUT BEFORE YOU GOT MONEY TO BUY MORE.: NEVER TRUE

## 2023-05-30 SDOH — ECONOMIC STABILITY: FOOD INSECURITY: WITHIN THE PAST 12 MONTHS, THE FOOD YOU BOUGHT JUST DIDN'T LAST AND YOU DIDN'T HAVE MONEY TO GET MORE.: NEVER TRUE

## 2023-05-30 SDOH — ECONOMIC STABILITY: HOUSING INSECURITY
IN THE LAST 12 MONTHS, WAS THERE A TIME WHEN YOU DID NOT HAVE A STEADY PLACE TO SLEEP OR SLEPT IN A SHELTER (INCLUDING NOW)?: NO

## 2023-05-30 SDOH — ECONOMIC STABILITY: INCOME INSECURITY: HOW HARD IS IT FOR YOU TO PAY FOR THE VERY BASICS LIKE FOOD, HOUSING, MEDICAL CARE, AND HEATING?: NOT HARD AT ALL

## 2023-05-30 ASSESSMENT — ENCOUNTER SYMPTOMS
SHORTNESS OF BREATH: 0
COUGH: 0

## 2023-05-30 ASSESSMENT — PATIENT HEALTH QUESTIONNAIRE - PHQ9
4. FEELING TIRED OR HAVING LITTLE ENERGY: 0
10. IF YOU CHECKED OFF ANY PROBLEMS, HOW DIFFICULT HAVE THESE PROBLEMS MADE IT FOR YOU TO DO YOUR WORK, TAKE CARE OF THINGS AT HOME, OR GET ALONG WITH OTHER PEOPLE: 0
SUM OF ALL RESPONSES TO PHQ QUESTIONS 1-9: 0
9. THOUGHTS THAT YOU WOULD BE BETTER OFF DEAD, OR OF HURTING YOURSELF: 0
5. POOR APPETITE OR OVEREATING: 0
1. LITTLE INTEREST OR PLEASURE IN DOING THINGS: 0
SUM OF ALL RESPONSES TO PHQ QUESTIONS 1-9: 0
SUM OF ALL RESPONSES TO PHQ QUESTIONS 1-9: 0
SUM OF ALL RESPONSES TO PHQ9 QUESTIONS 1 & 2: 0
2. FEELING DOWN, DEPRESSED OR HOPELESS: 0
6. FEELING BAD ABOUT YOURSELF - OR THAT YOU ARE A FAILURE OR HAVE LET YOURSELF OR YOUR FAMILY DOWN: 0
7. TROUBLE CONCENTRATING ON THINGS, SUCH AS READING THE NEWSPAPER OR WATCHING TELEVISION: 0
SUM OF ALL RESPONSES TO PHQ QUESTIONS 1-9: 0
3. TROUBLE FALLING OR STAYING ASLEEP: 0
8. MOVING OR SPEAKING SO SLOWLY THAT OTHER PEOPLE COULD HAVE NOTICED. OR THE OPPOSITE, BEING SO FIGETY OR RESTLESS THAT YOU HAVE BEEN MOVING AROUND A LOT MORE THAN USUAL: 0

## 2023-05-30 NOTE — PROGRESS NOTES
Tiana Gorman (:  1965) is a 56 y.o. female,Established patient, here for evaluation of the following chief complaint(s):  Hypertension and 6 Month Follow-Up    ASSESSMENT/PLAN:  1. Thinning hair  -     bimatoprost 0.03 % SOLN; Place one drop on applicator and apply evenly along the skin of the upper eyelid at base of eyelashes once daily at bedtime; repeat procedure for second eye (use a clean applicator), Disp-1 each, R-1Normal  2. Medication refill  -     buPROPion (WELLBUTRIN XL) 150 MG extended release tablet; TAKE ONE TABLET BY MOUTH DAILY, Disp-90 tablet, R-1Normal  -     hydroCHLOROthiazide (HYDRODIURIL) 25 MG tablet; Take one tablet by mouth daily, Disp-90 tablet, R-1Normal  3. Chronic anxiety  -     LORazepam (ATIVAN) 0.5 MG tablet; TAKE ONE TABLET BY MOUTH DAILY AS NEEDED FOR ANXIETY FOR UP TO 60 DAYS, Disp-30 tablet, R-1Normal      No follow-ups on file. Subjective   SUBJECTIVE/OBJECTIVE:  Hypertension  Pertinent negatives include no chest pain, palpitations or shortness of breath. Eye exam-states she is going to make an apt. Anxiety- seldom use of lorazepam      Review of Systems   Constitutional:  Negative for chills and fever. Respiratory:  Negative for cough and shortness of breath. Cardiovascular:  Negative for chest pain, palpitations and leg swelling. Objective   Vitals:    23 0855   BP: 132/84   Pulse: 74   SpO2: 95%     Wt Readings from Last 3 Encounters:   23 166 lb (75.3 kg)   22 169 lb (76.7 kg)   22 155 lb (70.3 kg)       Physical Exam  Constitutional:       Appearance: She is well-developed. HENT:      Right Ear: External ear normal.      Left Ear: External ear normal.      Nose: Nose normal.   Cardiovascular:      Rate and Rhythm: Normal rate and regular rhythm. Heart sounds: Normal heart sounds, S1 normal and S2 normal.   Pulmonary:      Effort: Pulmonary effort is normal. No respiratory distress.       Breath sounds: Normal

## 2023-07-14 ENCOUNTER — TELEPHONE (OUTPATIENT)
Dept: FAMILY MEDICINE CLINIC | Age: 58
End: 2023-07-14

## 2023-07-14 DIAGNOSIS — E78.5 HYPERLIPIDEMIA, UNSPECIFIED HYPERLIPIDEMIA TYPE: Primary | ICD-10-CM

## 2023-07-14 NOTE — TELEPHONE ENCOUNTER
Advise repeating lab  Results look quite different compared to ones completed in November of 2022. Make sure she is truly fasting. Nothing to eat or drink 10-12 hours prior to completing.

## 2023-07-14 NOTE — TELEPHONE ENCOUNTER
Received call from [facility]. States patient completed a health screen with them and her cholesterol was very elevated along with her triglycerides. Stated patient has family history of high cholesterol.  They will fax copy of results

## 2023-07-21 ENCOUNTER — HOSPITAL ENCOUNTER (OUTPATIENT)
Age: 58
Setting detail: SPECIMEN
Discharge: HOME | End: 2023-07-21

## 2023-07-21 DIAGNOSIS — E78.5 HYPERLIPIDEMIA, UNSPECIFIED HYPERLIPIDEMIA TYPE: ICD-10-CM

## 2023-07-21 LAB
CHOLEST SERPL-MCNC: 228 MG/DL
CHOLESTEROL/HDL RATIO: 4.2
HDLC SERPL-MCNC: 54 MG/DL
LDLC SERPL CALC-MCNC: 151 MG/DL (ref 0–130)
TRIGL SERPL-MCNC: 117 MG/DL

## 2023-08-24 ENCOUNTER — HOSPITAL ENCOUNTER (EMERGENCY)
Age: 58
Discharge: HOME | End: 2023-08-24
Attending: EMERGENCY MEDICINE
Payer: COMMERCIAL

## 2023-08-24 VITALS
OXYGEN SATURATION: 98 % | WEIGHT: 155 LBS | BODY MASS INDEX: 27.46 KG/M2 | HEIGHT: 63 IN | TEMPERATURE: 97.6 F | SYSTOLIC BLOOD PRESSURE: 174 MMHG | RESPIRATION RATE: 18 BRPM | DIASTOLIC BLOOD PRESSURE: 87 MMHG | HEART RATE: 74 BPM

## 2023-08-24 DIAGNOSIS — L03.811 CELLULITIS OF SCALP: ICD-10-CM

## 2023-08-24 DIAGNOSIS — T78.49XA ALLERGIC REACTION TO HAIR DYE: Primary | ICD-10-CM

## 2023-08-24 PROCEDURE — 96372 THER/PROPH/DIAG INJ SC/IM: CPT

## 2023-08-24 PROCEDURE — 6360000002 HC RX W HCPCS: Performed by: NURSE PRACTITIONER

## 2023-08-24 PROCEDURE — 6370000000 HC RX 637 (ALT 250 FOR IP): Performed by: NURSE PRACTITIONER

## 2023-08-24 PROCEDURE — 99284 EMERGENCY DEPT VISIT MOD MDM: CPT

## 2023-08-24 RX ORDER — DEXAMETHASONE SODIUM PHOSPHATE 10 MG/ML
8 INJECTION, SOLUTION INTRAMUSCULAR; INTRAVENOUS ONCE
Status: COMPLETED | OUTPATIENT
Start: 2023-08-24 | End: 2023-08-24

## 2023-08-24 RX ORDER — CEPHALEXIN 500 MG/1
500 CAPSULE ORAL ONCE
Status: DISCONTINUED | OUTPATIENT
Start: 2023-08-24 | End: 2023-08-24

## 2023-08-24 RX ORDER — DIPHENHYDRAMINE HCL 25 MG
25 TABLET ORAL EVERY 6 HOURS PRN
Qty: 20 TABLET | Refills: 0 | Status: SHIPPED | OUTPATIENT
Start: 2023-08-24 | End: 2023-09-23

## 2023-08-24 RX ORDER — SULFAMETHOXAZOLE AND TRIMETHOPRIM 800; 160 MG/1; MG/1
1 TABLET ORAL ONCE
Status: COMPLETED | OUTPATIENT
Start: 2023-08-24 | End: 2023-08-24

## 2023-08-24 RX ORDER — SULFAMETHOXAZOLE AND TRIMETHOPRIM 800; 160 MG/1; MG/1
1 TABLET ORAL 2 TIMES DAILY
Qty: 20 TABLET | Refills: 0 | Status: SHIPPED | OUTPATIENT
Start: 2023-08-24 | End: 2023-09-03

## 2023-08-24 RX ORDER — DIPHENHYDRAMINE HCL 25 MG
25 TABLET ORAL ONCE
Status: COMPLETED | OUTPATIENT
Start: 2023-08-24 | End: 2023-08-24

## 2023-08-24 RX ORDER — CEPHALEXIN 500 MG/1
500 CAPSULE ORAL 3 TIMES DAILY
Qty: 30 CAPSULE | Refills: 0 | Status: SHIPPED | OUTPATIENT
Start: 2023-08-24 | End: 2023-08-24

## 2023-08-24 RX ORDER — PREDNISONE 20 MG/1
40 TABLET ORAL DAILY
Qty: 10 TABLET | Refills: 0 | Status: SHIPPED | OUTPATIENT
Start: 2023-08-24 | End: 2023-08-29

## 2023-08-24 RX ADMIN — SULFAMETHOXAZOLE AND TRIMETHOPRIM 1 TABLET: 800; 160 TABLET ORAL at 22:27

## 2023-08-24 RX ADMIN — DIPHENHYDRAMINE HCL 25 MG: 25 TABLET ORAL at 22:21

## 2023-08-24 RX ADMIN — DEXAMETHASONE SODIUM PHOSPHATE 8 MG: 10 INJECTION, SOLUTION INTRAMUSCULAR; INTRAVENOUS at 22:21

## 2023-08-24 ASSESSMENT — ENCOUNTER SYMPTOMS: COLOR CHANGE: 1

## 2023-08-25 NOTE — ED PROVIDER NOTES
eMERGENCY dEPARTMENT eNCOUnter   [address]  Name: Megan Jackson  MRN: [id number]  [address] [facility] 1965  Date of evaluation: 8/24/23     Megan Jackson is a 56 y.o. female with CC: Headache (Woke up this morning with headache and sinus pressure.) and Facial Swelling (Forehead is swollen, hx of sinus issues.)      Based on the medical record the care appears appropriate. I was personally available for consultation in the Emergency Department.     Sammy Garcia DO  Attending Emergency Physician                  Sammy Garcia DO  08/24/23 1129

## 2023-08-25 NOTE — DISCHARGE INSTRUCTIONS
Take medications as prescribed. Follow-up with your primary care provider on Monday as scheduled. Return to emergency department if symptoms worsen.

## 2023-08-25 NOTE — ED PROVIDER NOTES
[facility]  eMERGENCY dEPARTMENT eNCOUnter      Pt Name: Franchesca Rich  MRN: [id number]  [address] [facility], [place], operated by [facility] 1965  Date of evaluation: 8/24/2023  Provider: Deborah Gonzalez, [address] [place] [address]       Chief Complaint   Patient presents with    Headache     Woke up this morning with headache and sinus pressure. Facial Swelling     Forehead is swollen, hx of sinus issues. HISTORY OF PRESENT ILLNESS  (Location/Symptom, Timing/Onset, Context/Setting, Quality, Duration, Modifying Factors, Severity.)   Franchesca Rich is a 56 y.o. female who presents to the emergency department for evaluation of swelling and erythema to her forehead and scalp that started when she woke up this morning. Patient states she did get her hair dyed yesterday. Denies difficulty swallowing, fever or chills. Does not take an ACE inhibitor. Nursing Notes were reviewed.     ALLERGIES     Codeine, Oxycodone-acetaminophen, Percocet [oxycodone-acetaminophen], and Simvastatin    CURRENT MEDICATIONS       Previous Medications    BIMATOPROST 0.03 % SOLN    Place one drop on applicator and apply evenly along the skin of the upper eyelid at base of eyelashes once daily at bedtime; repeat procedure for second eye (use a clean applicator)    BUPROPION (WELLBUTRIN XL) 150 MG EXTENDED RELEASE TABLET    TAKE ONE TABLET BY MOUTH DAILY    ESCITALOPRAM (LEXAPRO) 10 MG TABLET    TAKE 1/2 TABLET BY MOUTH DAILY    HYDROCHLOROTHIAZIDE (HYDRODIURIL) 25 MG TABLET    Take one tablet by mouth daily    LORAZEPAM (ATIVAN) 0.5 MG TABLET    TAKE ONE TABLET BY MOUTH DAILY AS NEEDED FOR ANXIETY FOR UP TO 60 DAYS    PANTOPRAZOLE (PROTONIX) 40 MG TABLET    TAKE ONE TABLET BY MOUTH DAILY       PAST MEDICAL HISTORY         Diagnosis Date    Anxiety     ASCUS (atypical squamous cells of undetermined significance) on Pap smear 2006    Depression     Endometriosis     Found on scope 8/22/12    GERD (gastroesophageal reflux disease)     HTN (hypertension)

## 2023-08-28 ENCOUNTER — OFFICE VISIT (OUTPATIENT)
Dept: FAMILY MEDICINE CLINIC | Age: 58
End: 2023-08-28
Payer: COMMERCIAL

## 2023-08-28 VITALS
HEART RATE: 59 BPM | BODY MASS INDEX: 29.94 KG/M2 | OXYGEN SATURATION: 97 % | SYSTOLIC BLOOD PRESSURE: 130 MMHG | DIASTOLIC BLOOD PRESSURE: 80 MMHG | WEIGHT: 169 LBS

## 2023-08-28 DIAGNOSIS — L81.9 HYPERPIGMENTATION: ICD-10-CM

## 2023-08-28 DIAGNOSIS — F41.9 CHRONIC ANXIETY: Primary | ICD-10-CM

## 2023-08-28 DIAGNOSIS — Z76.0 MEDICATION REFILL: ICD-10-CM

## 2023-08-28 DIAGNOSIS — I10 ESSENTIAL HYPERTENSION: ICD-10-CM

## 2023-08-28 PROCEDURE — 3079F DIAST BP 80-89 MM HG: CPT | Performed by: NURSE PRACTITIONER

## 2023-08-28 PROCEDURE — 3075F SYST BP GE 130 - 139MM HG: CPT | Performed by: NURSE PRACTITIONER

## 2023-08-28 PROCEDURE — 99214 OFFICE O/P EST MOD 30 MIN: CPT | Performed by: NURSE PRACTITIONER

## 2023-08-28 RX ORDER — HYDROCHLOROTHIAZIDE 25 MG/1
TABLET ORAL
Qty: 90 TABLET | Refills: 1 | Status: SHIPPED | OUTPATIENT
Start: 2023-08-28

## 2023-08-28 RX ORDER — ESCITALOPRAM OXALATE 10 MG/1
5 TABLET ORAL DAILY
Qty: 45 TABLET | Refills: 1 | Status: SHIPPED | OUTPATIENT
Start: 2023-08-28

## 2023-08-28 RX ORDER — LORAZEPAM 0.5 MG/1
TABLET ORAL
Qty: 30 TABLET | Refills: 1 | Status: SHIPPED | OUTPATIENT
Start: 2023-08-28 | End: 2024-03-23

## 2023-08-28 RX ORDER — BUPROPION HYDROCHLORIDE 150 MG/1
TABLET ORAL
Qty: 90 TABLET | Refills: 1 | Status: SHIPPED | OUTPATIENT
Start: 2023-08-28

## 2023-08-28 RX ORDER — PANTOPRAZOLE SODIUM 40 MG/1
40 TABLET, DELAYED RELEASE ORAL DAILY
Qty: 90 TABLET | Refills: 1 | Status: SHIPPED | OUTPATIENT
Start: 2023-08-28

## 2023-08-28 RX ORDER — TRETINOIN 0.5 MG/G
CREAM TOPICAL
Qty: 1 EACH | Refills: 0 | Status: SHIPPED | OUTPATIENT
Start: 2023-08-28 | End: 2023-09-27

## 2023-08-28 ASSESSMENT — ENCOUNTER SYMPTOMS
COUGH: 0
SHORTNESS OF BREATH: 0

## 2023-08-28 NOTE — PROGRESS NOTES
Alton Mullen (:  1965) is a 56 y.o. female,Established patient, here for evaluation of the following chief complaint(s):  Hypertension and 3 Month Follow-Up         ASSESSMENT/PLAN:  1. Chronic anxiety  -     LORazepam (ATIVAN) 0.5 MG tablet; TAKE ONE TABLET BY MOUTH DAILY AS NEEDED FOR ANXIETY FOR UP TO 60 DAYS, Disp-30 tablet, R-1Normal  2. Medication refill  -     hydroCHLOROthiazide (HYDRODIURIL) 25 MG tablet; Take one tablet by mouth daily, Disp-90 tablet, R-1Normal  -     buPROPion (WELLBUTRIN XL) 150 MG extended release tablet; TAKE ONE TABLET BY MOUTH DAILY, Disp-90 tablet, R-1Normal  -     escitalopram (LEXAPRO) 10 MG tablet; Take 0.5 tablets by mouth daily, Disp-45 tablet, R-1Normal  -     pantoprazole (PROTONIX) 40 MG tablet; Take 1 tablet by mouth daily, Disp-90 tablet, R-1Normal  3. Essential hypertension  Stable continue medication    4. Hyperpigmentation  -     tretinoin (RETIN-A) 0.05 % cream; Apply topically nightly., Disp-1 each, R-0, Normal  Pt asking for retin a for hyperpigmentation      Return in about 3 months (around 2023). Subjective   SUBJECTIVE/OBJECTIVE:  Hypertension  Pertinent negatives include no chest pain, palpitations or shortness of breath. Review of Systems   Constitutional:  Negative for chills and fever. Respiratory:  Negative for cough and shortness of breath. Cardiovascular:  Negative for chest pain, palpitations and leg swelling. Objective   Vitals:    23 0842   BP: 130/80   Pulse: 59   SpO2: 97%     Physical Exam  Constitutional:       Appearance: She is well-developed. HENT:      Right Ear: External ear normal.      Left Ear: External ear normal.      Nose: Nose normal.   Cardiovascular:      Rate and Rhythm: Normal rate and regular rhythm. Heart sounds: Normal heart sounds, S1 normal and S2 normal.   Pulmonary:      Effort: Pulmonary effort is normal. No respiratory distress. Breath sounds: Normal breath sounds.

## 2023-09-26 ENCOUNTER — HOSPITAL ENCOUNTER (OUTPATIENT)
Dept: MAMMOGRAPHY | Age: 58
Discharge: HOME | End: 2023-09-28
Payer: COMMERCIAL

## 2023-09-26 VITALS — WEIGHT: 169 LBS | BODY MASS INDEX: 29.95 KG/M2 | HEIGHT: 63 IN

## 2023-09-26 DIAGNOSIS — Z12.31 ENCOUNTER FOR SCREENING MAMMOGRAM FOR BREAST CANCER: ICD-10-CM

## 2023-09-26 PROCEDURE — 77063 BREAST TOMOSYNTHESIS BI: CPT

## 2023-11-19 NOTE — PROGRESS NOTES
Family History   Problem Relation Age of Onset    Uterine Cancer Paternal Grandmother     Colon Cancer Paternal Grandmother 72    Heart Disease Father     Cancer Maternal Grandmother         bone and breast    Heart Disease Maternal Grandfather        Review of Systems:   Review of Systems   Constitutional:  Negative for chills and fever. HENT:  Negative for congestion and hearing loss. Respiratory:  Negative for cough and shortness of breath. Cardiovascular:  Negative for chest pain and palpitations. Gastrointestinal:  Negative for abdominal pain. Genitourinary:  Negative for dyspareunia, pelvic pain and vaginal discharge. Musculoskeletal:  Negative for back pain. Neurological:  Negative for dizziness and light-headedness. Psychiatric/Behavioral:  The patient is not nervous/anxious. Emotional, increased crying       Physical exam:  vitals:  Height   5  ft    3 in,  Weight    168 lbs,   151/95 BP  Gen: alert, no apparent distress  HEENT:No pathologic skin lesions noted,NC/AT,PERRL, normal midline nontender thyroid   Lung Exam: Clear to auscultation in all fields bilaterally, without wheezes,rales or rhonchi. Cardiac Exam: Normal sinus rhythm andrate, without murmurs, rubs or gallops appreciated. Breast Exam: Symmetric without pathological skin changes, nontender without discrete suspicious masses palpated, supraclavicular or axillary adenopathy or nipple discharge noted. Abdominal Exam: Nontender to deep palpation without organomegaly, masses or CVAT appreciated, BS positive. No spinal deformation or tenderness. External Genitalia: Normal development without vulvar,vaginal or cervical lesions noted. Normal vaginal discharge, uterus anterior, 4-6 weeks without CMT. Adnexa nontender without abnormal masses bilaterally. Rectal Exam: Omitted. Extremities: Nontender without clubbing, cyanosis or edema. F.R.O.M.   Neurologic Exam: Grossly intact without noted sensorimotor deficits

## 2023-11-20 ENCOUNTER — OFFICE VISIT (OUTPATIENT)
Dept: OBGYN CLINIC | Age: 58
End: 2023-11-20
Payer: COMMERCIAL

## 2023-11-20 VITALS
WEIGHT: 168 LBS | BODY MASS INDEX: 29.77 KG/M2 | DIASTOLIC BLOOD PRESSURE: 95 MMHG | HEART RATE: 81 BPM | HEIGHT: 63 IN | SYSTOLIC BLOOD PRESSURE: 151 MMHG

## 2023-11-20 DIAGNOSIS — Z76.0 MEDICATION REFILL: ICD-10-CM

## 2023-11-20 PROCEDURE — 3077F SYST BP >= 140 MM HG: CPT | Performed by: OBSTETRICS & GYNECOLOGY

## 2023-11-20 PROCEDURE — 3080F DIAST BP >= 90 MM HG: CPT | Performed by: OBSTETRICS & GYNECOLOGY

## 2023-11-20 PROCEDURE — 99396 PREV VISIT EST AGE 40-64: CPT | Performed by: OBSTETRICS & GYNECOLOGY

## 2023-11-20 RX ORDER — ESCITALOPRAM OXALATE 10 MG/1
10 TABLET ORAL DAILY
Qty: 30 TABLET | Refills: 11 | Status: SHIPPED | OUTPATIENT
Start: 2023-11-20

## 2023-11-20 ASSESSMENT — ENCOUNTER SYMPTOMS
COUGH: 0
SHORTNESS OF BREATH: 0
ABDOMINAL PAIN: 0
BACK PAIN: 0

## 2023-11-27 ENCOUNTER — OFFICE VISIT (OUTPATIENT)
Dept: FAMILY MEDICINE CLINIC | Age: 58
End: 2023-11-27
Payer: COMMERCIAL

## 2023-11-27 VITALS
SYSTOLIC BLOOD PRESSURE: 110 MMHG | HEART RATE: 70 BPM | BODY MASS INDEX: 29.41 KG/M2 | WEIGHT: 166 LBS | OXYGEN SATURATION: 98 % | DIASTOLIC BLOOD PRESSURE: 80 MMHG

## 2023-11-27 DIAGNOSIS — F41.9 CHRONIC ANXIETY: ICD-10-CM

## 2023-11-27 PROCEDURE — 3079F DIAST BP 80-89 MM HG: CPT | Performed by: NURSE PRACTITIONER

## 2023-11-27 PROCEDURE — 99213 OFFICE O/P EST LOW 20 MIN: CPT | Performed by: NURSE PRACTITIONER

## 2023-11-27 PROCEDURE — 3074F SYST BP LT 130 MM HG: CPT | Performed by: NURSE PRACTITIONER

## 2023-11-27 RX ORDER — LORAZEPAM 0.5 MG/1
TABLET ORAL
Qty: 30 TABLET | Refills: 1 | Status: SHIPPED | OUTPATIENT
Start: 2023-11-27 | End: 2024-06-22

## 2023-11-27 RX ORDER — CYANOCOBALAMIN 1000 UG/ML
1000 INJECTION, SOLUTION INTRAMUSCULAR; SUBCUTANEOUS ONCE
COMMUNITY

## 2023-11-27 ASSESSMENT — ENCOUNTER SYMPTOMS
SHORTNESS OF BREATH: 0
COUGH: 0

## 2023-11-27 NOTE — PROGRESS NOTES
Cierra Wills (:  1965) is a 56 y.o. female,Established patient, here for evaluation of the following chief complaint(s): Anxiety and 3 Month Follow-Up         ASSESSMENT/PLAN:  1. Chronic anxiety  The following orders have not been finalized:  -     LORazepam (ATIVAN) 0.5 MG tablet      No follow-ups on file. Subjective   SUBJECTIVE/OBJECTIVE:  Anxiety  Patient reports no chest pain, palpitations or shortness of breath. Just using ativan occasionally. Hasn't used  in 3  weeks. Only uses when she feels very stressed. Pdmp reviewed an appropriate. Review of Systems   Constitutional:  Negative for chills and fever. Respiratory:  Negative for cough and shortness of breath. Cardiovascular:  Negative for chest pain, palpitations and leg swelling. Objective   Vitals:    23 0757   BP: 110/80   Pulse: 70   SpO2: 98%     Wt Readings from Last 3 Encounters:   23 75.3 kg (166 lb)   23 76.2 kg (168 lb)   23 76.7 kg (169 lb)       Physical Exam  Constitutional:       Appearance: She is well-developed. HENT:      Right Ear: External ear normal.      Left Ear: External ear normal.      Nose: Nose normal.   Cardiovascular:      Rate and Rhythm: Normal rate and regular rhythm. Heart sounds: Normal heart sounds, S1 normal and S2 normal.   Pulmonary:      Effort: Pulmonary effort is normal. No respiratory distress. Breath sounds: Normal breath sounds. Musculoskeletal:         General: No deformity. Normal range of motion. Cervical back: Full passive range of motion without pain and normal range of motion. Skin:     General: Skin is warm and dry. Neurological:      Mental Status: She is alert and oriented to person, place, and time. An electronic signature was used to authenticate this note.     --Joyce Baxter, LUPE - CNP
Patient is present for 3 month f/u
Alert and oriented to person, place and time

## 2023-11-30 DIAGNOSIS — Z76.0 MEDICATION REFILL: ICD-10-CM

## 2023-11-30 RX ORDER — PANTOPRAZOLE SODIUM 40 MG/1
40 TABLET, DELAYED RELEASE ORAL DAILY
Qty: 90 TABLET | Refills: 1 | OUTPATIENT
Start: 2023-11-30

## 2023-12-08 DIAGNOSIS — L65.9 THINNING HAIR: ICD-10-CM

## 2023-12-08 NOTE — TELEPHONE ENCOUNTER
Last visit: 11/27/2023  Last Med refill: 01/25/2023  Does patient have enough medication for 72 hours: No:     Next Visit Date:  Future Appointments   Date Time Provider [address]   2/26/2024  8:00 AM LUPE Aldridge CNP [facility] [address] Maintenance   Topic Date Due    Hepatitis B vaccine (1 of 3 - 3-dose series) Never done    HIV screen  Never done    Hepatitis C screen  Never done    Shingles vaccine (1 of 2) Never done    COVID-19 Vaccine (4 - 2023-24 season) 09/01/2023    Flu vaccine (1) 11/27/2024 (Originally 8/1/2023)    Depression Monitoring  05/30/2024    Breast cancer screen  09/26/2025    Diabetes screen  11/28/2025    Cervical cancer screen  05/04/2026    Lipids  07/21/2028    Colorectal Cancer Screen  08/10/2031    DTaP/Tdap/Td vaccine (2 - Td or Tdap) 11/28/2032    Hepatitis A vaccine  Aged Out    Hib vaccine  Aged Out    Meningococcal (ACWY) vaccine  Aged Out    Pneumococcal 0-64 years Vaccine  Aged Out       Hemoglobin A1C (%)   Date Value   11/28/2022 5.4   05/22/2015 5.0             ( goal A1C is < 7)   No components found for: \"LABMICR\"  LDL Cholesterol (mg/dL)   Date Value   07/21/2023 151 (H)   11/28/2022 159 (H)     LDL Calculated (mg/dL)   Date Value   12/14/2016 138       (goal LDL is <100)   AST (U/L)   Date Value   11/28/2022 19     ALT (U/L)   Date Value   11/28/2022 18     BUN (mg/dL)   Date Value   11/28/2022 12     BP Readings from Last 3 Encounters:   11/27/23 110/80   11/20/23 (!) 151/95   08/28/23 130/80          (goal 120/80)    All Future Testing planned in [facility]  Lab Frequency Next Occurrence               Patient Active Problem List:     Anxiety     Depression     GERD (gastroesophageal reflux disease)     Hyperlipidemia     Anemia     Endometriosis     ASCUS (atypical squamous cells of undetermined significance) on Pap smear     Neoplasm of uncertain behavior of skin     Essential hypertension     Encounter for cosmetic procedure

## 2023-12-13 RX ORDER — BIMATOPROST 3 UG/ML
SOLUTION TOPICAL
Qty: 3 ML | OUTPATIENT
Start: 2023-12-13

## 2024-02-15 NOTE — TELEPHONE ENCOUNTER
Last visit: 10/28/21  Last Med refill: 10/5/21  Does patient have enough medication for 72 hours: No:   Patient needs appt-LM for patient to call and schedule     Next Visit Date:  No future appointments. Health Maintenance   Topic Date Due    Hepatitis C screen  Never done    HIV screen  Never done    DTaP/Tdap/Td vaccine (1 - Tdap) Never done    Shingles Vaccine (1 of 2) Never done    COVID-19 Vaccine (3 - Booster for Moderna series) 09/15/2021    Depression Monitoring  03/22/2022    Flu vaccine (Season Ended) 09/01/2022    Potassium monitoring  09/10/2022    Creatinine monitoring  09/10/2022    Breast cancer screen  11/29/2023    Cervical cancer screen  05/04/2026    Lipid screen  09/10/2026    Colorectal Cancer Screen  08/10/2031    Hepatitis A vaccine  Aged Out    Hepatitis B vaccine  Aged Out    Hib vaccine  Aged Out    Meningococcal (ACWY) vaccine  Aged Out    Pneumococcal 0-64 years Vaccine  Aged Out       Hemoglobin A1C (%)   Date Value   05/22/2015 5.0             ( goal A1C is < 7)   Microalb/Crt.  Ratio (mcg/mg creat)   Date Value   05/22/2015 19     LDL Cholesterol (mg/dL)   Date Value   09/10/2021 201 (H)   05/22/2015 135 (H)     LDL Calculated (mg/dL)   Date Value   12/14/2016 138       (goal LDL is <100)   AST (U/L)   Date Value   09/10/2021 18     ALT (U/L)   Date Value   09/10/2021 17     BUN (mg/dL)   Date Value   09/10/2021 14     BP Readings from Last 3 Encounters:   09/27/21 131/88   08/10/21 129/68   08/10/21 110/64          (goal 120/80)    All Future Testing planned in [facility]  Lab Frequency Next Occurrence               Patient Active Problem List:     Anxiety     Depression     GERD (gastroesophageal reflux disease)     Hyperlipidemia     Anemia     Endometriosis     ASCUS (atypical squamous cells of undetermined significance) on Pap smear     Neoplasm of uncertain behavior of skin     Essential hypertension     Encounter for cosmetic surgery     Hypertrophy of breast     Bilateral mastodynia     Chronic bilateral thoracic back pain     Acute cervical myofascial strain     Rash, skin no chest pain and no edema.

## 2024-02-26 ENCOUNTER — OFFICE VISIT (OUTPATIENT)
Dept: FAMILY MEDICINE CLINIC | Age: 59
End: 2024-02-26
Payer: COMMERCIAL

## 2024-02-26 ENCOUNTER — HOSPITAL ENCOUNTER (OUTPATIENT)
Age: 59
Setting detail: SPECIMEN
Discharge: HOME | End: 2024-02-26

## 2024-02-26 VITALS
SYSTOLIC BLOOD PRESSURE: 122 MMHG | OXYGEN SATURATION: 98 % | HEART RATE: 90 BPM | DIASTOLIC BLOOD PRESSURE: 82 MMHG | WEIGHT: 141.8 LBS | BODY MASS INDEX: 25.12 KG/M2

## 2024-02-26 DIAGNOSIS — I10 ESSENTIAL HYPERTENSION: Primary | ICD-10-CM

## 2024-02-26 DIAGNOSIS — I10 ESSENTIAL HYPERTENSION: ICD-10-CM

## 2024-02-26 DIAGNOSIS — Z76.0 MEDICATION REFILL: ICD-10-CM

## 2024-02-26 DIAGNOSIS — F41.9 CHRONIC ANXIETY: ICD-10-CM

## 2024-02-26 PROBLEM — E66.9 OBESITY (BMI 30.0-34.9): Status: ACTIVE | Noted: 2024-02-26

## 2024-02-26 PROBLEM — E66.811 OBESITY (BMI 30.0-34.9): Status: ACTIVE | Noted: 2024-02-26

## 2024-02-26 LAB
ALBUMIN SERPL-MCNC: 4.6 G/DL (ref 3.5–5.2)
ALBUMIN/GLOB SERPL: 2 {RATIO} (ref 1–2.5)
ALP SERPL-CCNC: 50 U/L (ref 35–104)
ALT SERPL-CCNC: 13 U/L (ref 10–35)
ANION GAP SERPL CALCULATED.3IONS-SCNC: 11 MMOL/L (ref 9–16)
AST SERPL-CCNC: 21 U/L (ref 10–35)
BILIRUB SERPL-MCNC: 0.5 MG/DL (ref 0–1.2)
BUN SERPL-MCNC: 12 MG/DL (ref 6–20)
CALCIUM SERPL-MCNC: 9.8 MG/DL (ref 8.6–10.4)
CHLORIDE SERPL-SCNC: 100 MMOL/L (ref 98–107)
CO2 SERPL-SCNC: 28 MMOL/L (ref 20–31)
CREAT SERPL-MCNC: 0.8 MG/DL (ref 0.5–0.9)
ERYTHROCYTE [DISTWIDTH] IN BLOOD BY AUTOMATED COUNT: 13.2 % (ref 11.8–14.4)
GFR SERPL CREATININE-BSD FRML MDRD: >60 ML/MIN/1.73M2
GLUCOSE SERPL-MCNC: 84 MG/DL (ref 74–99)
HCT VFR BLD AUTO: 41.5 % (ref 36.3–47.1)
HGB BLD-MCNC: 14.1 G/DL (ref 11.9–15.1)
MCH RBC QN AUTO: 30.7 PG (ref 25.2–33.5)
MCHC RBC AUTO-ENTMCNC: 34 G/DL (ref 28.4–34.8)
MCV RBC AUTO: 90.2 FL (ref 82.6–102.9)
NRBC BLD-RTO: 0 PER 100 WBC
PLATELET # BLD AUTO: 192 K/UL (ref 138–453)
PMV BLD AUTO: 10.9 FL (ref 8.1–13.5)
POTASSIUM SERPL-SCNC: 4.2 MMOL/L (ref 3.7–5.3)
PROT SERPL-MCNC: 7.1 G/DL (ref 6.6–8.7)
RBC # BLD AUTO: 4.6 M/UL (ref 3.95–5.11)
SODIUM SERPL-SCNC: 139 MMOL/L (ref 136–145)
TSH SERPL DL<=0.05 MIU/L-ACNC: 1.21 UIU/ML (ref 0.27–4.2)
WBC OTHER # BLD: 4.9 K/UL (ref 3.5–11.3)

## 2024-02-26 PROCEDURE — 99214 OFFICE O/P EST MOD 30 MIN: CPT | Performed by: NURSE PRACTITIONER

## 2024-02-26 PROCEDURE — 3079F DIAST BP 80-89 MM HG: CPT | Performed by: NURSE PRACTITIONER

## 2024-02-26 PROCEDURE — 3074F SYST BP LT 130 MM HG: CPT | Performed by: NURSE PRACTITIONER

## 2024-02-26 RX ORDER — LORAZEPAM 0.5 MG/1
TABLET ORAL
Qty: 30 TABLET | Refills: 1 | Status: SHIPPED | OUTPATIENT
Start: 2024-02-26 | End: 2024-09-21

## 2024-02-26 RX ORDER — HYDROCHLOROTHIAZIDE 25 MG/1
TABLET ORAL
Qty: 90 TABLET | Refills: 1 | Status: SHIPPED | OUTPATIENT
Start: 2024-02-26

## 2024-02-26 RX ORDER — BUPROPION HYDROCHLORIDE 150 MG/1
TABLET ORAL
Qty: 90 TABLET | Refills: 1 | Status: SHIPPED | OUTPATIENT
Start: 2024-02-26

## 2024-02-26 RX ORDER — PANTOPRAZOLE SODIUM 40 MG/1
40 TABLET, DELAYED RELEASE ORAL DAILY
Qty: 90 TABLET | Refills: 1 | Status: SHIPPED | OUTPATIENT
Start: 2024-02-26

## 2024-02-26 ASSESSMENT — PATIENT HEALTH QUESTIONNAIRE - PHQ9
3. TROUBLE FALLING OR STAYING ASLEEP: 0
1. LITTLE INTEREST OR PLEASURE IN DOING THINGS: 0
SUM OF ALL RESPONSES TO PHQ QUESTIONS 1-9: 0
6. FEELING BAD ABOUT YOURSELF - OR THAT YOU ARE A FAILURE OR HAVE LET YOURSELF OR YOUR FAMILY DOWN: 0
2. FEELING DOWN, DEPRESSED OR HOPELESS: 0
SUM OF ALL RESPONSES TO PHQ9 QUESTIONS 1 & 2: 0
7. TROUBLE CONCENTRATING ON THINGS, SUCH AS READING THE NEWSPAPER OR WATCHING TELEVISION: 0
SUM OF ALL RESPONSES TO PHQ QUESTIONS 1-9: 0
10. IF YOU CHECKED OFF ANY PROBLEMS, HOW DIFFICULT HAVE THESE PROBLEMS MADE IT FOR YOU TO DO YOUR WORK, TAKE CARE OF THINGS AT HOME, OR GET ALONG WITH OTHER PEOPLE: 0
8. MOVING OR SPEAKING SO SLOWLY THAT OTHER PEOPLE COULD HAVE NOTICED. OR THE OPPOSITE, BEING SO FIGETY OR RESTLESS THAT YOU HAVE BEEN MOVING AROUND A LOT MORE THAN USUAL: 0
9. THOUGHTS THAT YOU WOULD BE BETTER OFF DEAD, OR OF HURTING YOURSELF: 0
SUM OF ALL RESPONSES TO PHQ QUESTIONS 1-9: 0
4. FEELING TIRED OR HAVING LITTLE ENERGY: 0
5. POOR APPETITE OR OVEREATING: 0
SUM OF ALL RESPONSES TO PHQ QUESTIONS 1-9: 0

## 2024-02-26 ASSESSMENT — ENCOUNTER SYMPTOMS
SHORTNESS OF BREATH: 0
COUGH: 0

## 2024-02-26 NOTE — PROGRESS NOTES
Merlyn Angulo (:  1965) is a 58 y.o. female,Established patient, here for evaluation of the following chief complaint(s):  Anxiety (Patient is here for 3 month follow up.)      ASSESSMENT/PLAN:  1. Essential hypertension  Stable on hctz  -     CBC; Future  -     Comprehensive Metabolic Panel; Future  -     TSH; Future  2. Chronic anxiety  Seldom use of ativan.   -     LORazepam (ATIVAN) 0.5 MG tablet; TAKE ONE TABLET BY MOUTH DAILY AS NEEDED FOR ANXIETY FOR UP TO 60 DAYS, Disp-30 tablet, R-1Normal  3. Medication refill  -     buPROPion (WELLBUTRIN XL) 150 MG extended release tablet; TAKE ONE TABLET BY MOUTH DAILY, Disp-90 tablet, R-1Normal  -     hydroCHLOROthiazide (HYDRODIURIL) 25 MG tablet; Take one tablet by mouth daily, Disp-90 tablet, R-1Normal  -     pantoprazole (PROTONIX) 40 MG tablet; Take 1 tablet by mouth daily, Disp-90 tablet, R-1Normal      No follow-ups on file.       Subjective   SUBJECTIVE/OBJECTIVE:  Anxiety  Patient reports no chest pain, palpitations or shortness of breath.         Review of Systems   Constitutional:  Negative for chills and fever.   Respiratory:  Negative for cough and shortness of breath.    Cardiovascular:  Negative for chest pain, palpitations and leg swelling.       Objective   Vitals:    24 0818   BP: 122/82   Pulse:    SpO2:      Wt Readings from Last 3 Encounters:   24 64.3 kg (141 lb 12.8 oz)   23 75.3 kg (166 lb)   23 76.2 kg (168 lb)     Physical Exam  Constitutional:       Appearance: She is well-developed.   HENT:      Right Ear: External ear normal.      Left Ear: External ear normal.      Nose: Nose normal.   Cardiovascular:      Rate and Rhythm: Normal rate and regular rhythm.      Heart sounds: Normal heart sounds, S1 normal and S2 normal.   Pulmonary:      Effort: Pulmonary effort is normal. No respiratory distress.      Breath sounds: Normal breath sounds.   Musculoskeletal:         General: No deformity. Normal range of motion.

## 2024-03-26 ENCOUNTER — APPOINTMENT (OUTPATIENT)
Dept: CT IMAGING | Age: 59
End: 2024-03-26
Payer: COMMERCIAL

## 2024-03-26 ENCOUNTER — HOSPITAL ENCOUNTER (OUTPATIENT)
Age: 59
Setting detail: OBSERVATION
Discharge: HOME | End: 2024-03-27
Attending: EMERGENCY MEDICINE | Admitting: EMERGENCY MEDICINE
Payer: COMMERCIAL

## 2024-03-26 ENCOUNTER — APPOINTMENT (OUTPATIENT)
Dept: GENERAL RADIOLOGY | Age: 59
End: 2024-03-26
Payer: COMMERCIAL

## 2024-03-26 DIAGNOSIS — R10.32 ABDOMINAL PAIN, LEFT LOWER QUADRANT: ICD-10-CM

## 2024-03-26 DIAGNOSIS — E87.6 HYPOKALEMIA: ICD-10-CM

## 2024-03-26 DIAGNOSIS — R11.2 NAUSEA AND VOMITING, UNSPECIFIED VOMITING TYPE: ICD-10-CM

## 2024-03-26 DIAGNOSIS — K52.9 COLITIS: Primary | ICD-10-CM

## 2024-03-26 PROBLEM — R19.7 DIARRHEA: Status: ACTIVE | Noted: 2024-03-26

## 2024-03-26 LAB
ALBUMIN SERPL-MCNC: 4 G/DL (ref 3.5–5.2)
ALBUMIN/GLOB SERPL: 2 {RATIO} (ref 1–2.5)
ALP SERPL-CCNC: 44 U/L (ref 35–104)
ALT SERPL-CCNC: 9 U/L (ref 10–35)
ANION GAP SERPL CALCULATED.3IONS-SCNC: 10 MMOL/L (ref 9–16)
AST SERPL-CCNC: 19 U/L (ref 10–35)
BACTERIA URNS QL MICRO: NORMAL
BASOPHILS # BLD: 0.03 K/UL (ref 0–0.2)
BASOPHILS NFR BLD: 0 % (ref 0–2)
BILIRUB SERPL-MCNC: 0.3 MG/DL (ref 0–1.2)
BILIRUB UR QL STRIP: NEGATIVE
BUN SERPL-MCNC: 11 MG/DL (ref 6–20)
CALCIUM SERPL-MCNC: 7.8 MG/DL (ref 8.6–10.4)
CASTS #/AREA URNS LPF: NORMAL /LPF (ref 0–8)
CHLORIDE SERPL-SCNC: 110 MMOL/L (ref 98–107)
CLARITY UR: CLEAR
CO2 SERPL-SCNC: 23 MMOL/L (ref 20–31)
COLOR UR: ABNORMAL
CREAT SERPL-MCNC: 0.6 MG/DL (ref 0.5–0.9)
EOSINOPHIL # BLD: 0.05 K/UL (ref 0–0.44)
EOSINOPHILS RELATIVE PERCENT: 1 % (ref 1–4)
EPI CELLS #/AREA URNS HPF: NORMAL /HPF (ref 0–5)
ERYTHROCYTE [DISTWIDTH] IN BLOOD BY AUTOMATED COUNT: 13.3 % (ref 11.8–14.4)
GFR SERPL CREATININE-BSD FRML MDRD: >90 ML/MIN/1.73M2
GLUCOSE SERPL-MCNC: 107 MG/DL (ref 74–99)
GLUCOSE UR STRIP-MCNC: NEGATIVE MG/DL
HCT VFR BLD AUTO: 40 % (ref 36.3–47.1)
HGB BLD-MCNC: 13.8 G/DL (ref 11.9–15.1)
HGB UR QL STRIP.AUTO: NEGATIVE
IMM GRANULOCYTES # BLD AUTO: 0.05 K/UL (ref 0–0.3)
IMM GRANULOCYTES NFR BLD: 1 %
KETONES UR STRIP-MCNC: ABNORMAL MG/DL
LACTIC ACID, WHOLE BLOOD: 1.1 MMOL/L (ref 0.7–2.1)
LEUKOCYTE ESTERASE UR QL STRIP: ABNORMAL
LIPASE SERPL-CCNC: 26 U/L (ref 13–60)
LYMPHOCYTES NFR BLD: 1.14 K/UL (ref 1.1–3.7)
LYMPHOCYTES RELATIVE PERCENT: 12 % (ref 24–43)
MCH RBC QN AUTO: 31.1 PG (ref 25.2–33.5)
MCHC RBC AUTO-ENTMCNC: 34.5 G/DL (ref 28.4–34.8)
MCV RBC AUTO: 90.1 FL (ref 82.6–102.9)
MONOCYTES NFR BLD: 0.39 K/UL (ref 0.1–1.2)
MONOCYTES NFR BLD: 4 % (ref 3–12)
NEUTROPHILS NFR BLD: 82 % (ref 36–65)
NEUTS SEG NFR BLD: 8.02 K/UL (ref 1.5–8.1)
NITRITE UR QL STRIP: NEGATIVE
NRBC BLD-RTO: 0 PER 100 WBC
PH UR STRIP: 5 [PH] (ref 5–8)
PLATELET # BLD AUTO: 176 K/UL (ref 138–453)
PMV BLD AUTO: 9.8 FL (ref 8.1–13.5)
POTASSIUM SERPL-SCNC: 2.9 MMOL/L (ref 3.7–5.3)
POTASSIUM SERPL-SCNC: 4.8 MMOL/L (ref 3.7–5.3)
PROT SERPL-MCNC: 5.9 G/DL (ref 6.6–8.7)
PROT UR STRIP-MCNC: NEGATIVE MG/DL
RBC # BLD AUTO: 4.44 M/UL (ref 3.95–5.11)
RBC #/AREA URNS HPF: NORMAL /HPF (ref 0–4)
SODIUM SERPL-SCNC: 143 MMOL/L (ref 136–145)
SP GR UR STRIP: 1.07 (ref 1–1.03)
UROBILINOGEN UR STRIP-ACNC: NORMAL EU/DL (ref 0–1)
WBC #/AREA URNS HPF: NORMAL /HPF (ref 0–5)
WBC OTHER # BLD: 9.7 K/UL (ref 3.5–11.3)

## 2024-03-26 PROCEDURE — 84132 ASSAY OF SERUM POTASSIUM: CPT

## 2024-03-26 PROCEDURE — 83690 ASSAY OF LIPASE: CPT

## 2024-03-26 PROCEDURE — 93005 ELECTROCARDIOGRAM TRACING: CPT | Performed by: EMERGENCY MEDICINE

## 2024-03-26 PROCEDURE — 96361 HYDRATE IV INFUSION ADD-ON: CPT

## 2024-03-26 PROCEDURE — G0378 HOSPITAL OBSERVATION PER HR: HCPCS

## 2024-03-26 PROCEDURE — 87506 IADNA-DNA/RNA PROBE TQ 6-11: CPT

## 2024-03-26 PROCEDURE — 99285 EMERGENCY DEPT VISIT HI MDM: CPT

## 2024-03-26 PROCEDURE — 96367 TX/PROPH/DG ADDL SEQ IV INF: CPT

## 2024-03-26 PROCEDURE — 6360000004 HC RX CONTRAST MEDICATION: Performed by: PEDIATRICS

## 2024-03-26 PROCEDURE — 96368 THER/DIAG CONCURRENT INF: CPT

## 2024-03-26 PROCEDURE — 2580000003 HC RX 258: Performed by: STUDENT IN AN ORGANIZED HEALTH CARE EDUCATION/TRAINING PROGRAM

## 2024-03-26 PROCEDURE — 96366 THER/PROPH/DIAG IV INF ADDON: CPT

## 2024-03-26 PROCEDURE — 80053 COMPREHEN METABOLIC PANEL: CPT

## 2024-03-26 PROCEDURE — 83605 ASSAY OF LACTIC ACID: CPT

## 2024-03-26 PROCEDURE — 6360000002 HC RX W HCPCS: Performed by: PEDIATRICS

## 2024-03-26 PROCEDURE — 85025 COMPLETE CBC W/AUTO DIFF WBC: CPT

## 2024-03-26 PROCEDURE — 96375 TX/PRO/DX INJ NEW DRUG ADDON: CPT

## 2024-03-26 PROCEDURE — 2580000003 HC RX 258: Performed by: PEDIATRICS

## 2024-03-26 PROCEDURE — 96376 TX/PRO/DX INJ SAME DRUG ADON: CPT

## 2024-03-26 PROCEDURE — 71046 X-RAY EXAM CHEST 2 VIEWS: CPT

## 2024-03-26 PROCEDURE — 81001 URINALYSIS AUTO W/SCOPE: CPT

## 2024-03-26 PROCEDURE — 74177 CT ABD & PELVIS W/CONTRAST: CPT

## 2024-03-26 PROCEDURE — 96365 THER/PROPH/DIAG IV INF INIT: CPT

## 2024-03-26 RX ORDER — POTASSIUM CHLORIDE 7.45 MG/ML
10 INJECTION INTRAVENOUS PRN
Status: DISCONTINUED | OUTPATIENT
Start: 2024-03-26 | End: 2024-03-27 | Stop reason: HOSPADM

## 2024-03-26 RX ORDER — SODIUM CHLORIDE 0.9 % (FLUSH) 0.9 %
5-40 SYRINGE (ML) INJECTION PRN
Status: DISCONTINUED | OUTPATIENT
Start: 2024-03-26 | End: 2024-03-27 | Stop reason: HOSPADM

## 2024-03-26 RX ORDER — SODIUM CHLORIDE 0.9 % (FLUSH) 0.9 %
5-40 SYRINGE (ML) INJECTION EVERY 12 HOURS SCHEDULED
Status: DISCONTINUED | OUTPATIENT
Start: 2024-03-26 | End: 2024-03-27 | Stop reason: HOSPADM

## 2024-03-26 RX ORDER — ONDANSETRON 2 MG/ML
4 INJECTION INTRAMUSCULAR; INTRAVENOUS ONCE
Status: DISCONTINUED | OUTPATIENT
Start: 2024-03-26 | End: 2024-03-27 | Stop reason: HOSPADM

## 2024-03-26 RX ORDER — MORPHINE SULFATE 4 MG/ML
4 INJECTION, SOLUTION INTRAMUSCULAR; INTRAVENOUS ONCE
Status: COMPLETED | OUTPATIENT
Start: 2024-03-26 | End: 2024-03-26

## 2024-03-26 RX ORDER — PROCHLORPERAZINE EDISYLATE 5 MG/ML
10 INJECTION INTRAMUSCULAR; INTRAVENOUS ONCE
Status: COMPLETED | OUTPATIENT
Start: 2024-03-26 | End: 2024-03-26

## 2024-03-26 RX ORDER — METOCLOPRAMIDE HYDROCHLORIDE 5 MG/ML
10 INJECTION INTRAMUSCULAR; INTRAVENOUS ONCE
Status: COMPLETED | OUTPATIENT
Start: 2024-03-26 | End: 2024-03-26

## 2024-03-26 RX ORDER — SODIUM CHLORIDE 9 MG/ML
INJECTION, SOLUTION INTRAVENOUS PRN
Status: DISCONTINUED | OUTPATIENT
Start: 2024-03-26 | End: 2024-03-27 | Stop reason: HOSPADM

## 2024-03-26 RX ORDER — ENOXAPARIN SODIUM 100 MG/ML
40 INJECTION SUBCUTANEOUS DAILY
Status: DISCONTINUED | OUTPATIENT
Start: 2024-03-26 | End: 2024-03-27 | Stop reason: HOSPADM

## 2024-03-26 RX ORDER — SODIUM CHLORIDE 9 MG/ML
INJECTION, SOLUTION INTRAVENOUS CONTINUOUS
Status: DISCONTINUED | OUTPATIENT
Start: 2024-03-26 | End: 2024-03-27 | Stop reason: HOSPADM

## 2024-03-26 RX ORDER — SODIUM CHLORIDE, SODIUM LACTATE, POTASSIUM CHLORIDE, AND CALCIUM CHLORIDE .6; .31; .03; .02 G/100ML; G/100ML; G/100ML; G/100ML
1000 INJECTION, SOLUTION INTRAVENOUS ONCE
Status: DISCONTINUED | OUTPATIENT
Start: 2024-03-26 | End: 2024-03-26

## 2024-03-26 RX ORDER — 0.9 % SODIUM CHLORIDE 0.9 %
1000 INTRAVENOUS SOLUTION INTRAVENOUS ONCE
Status: COMPLETED | OUTPATIENT
Start: 2024-03-26 | End: 2024-03-26

## 2024-03-26 RX ORDER — ESCITALOPRAM OXALATE 10 MG/1
10 TABLET ORAL DAILY
Status: DISCONTINUED | OUTPATIENT
Start: 2024-03-26 | End: 2024-03-27 | Stop reason: HOSPADM

## 2024-03-26 RX ORDER — POTASSIUM CHLORIDE 7.45 MG/ML
10 INJECTION INTRAVENOUS
Status: COMPLETED | OUTPATIENT
Start: 2024-03-26 | End: 2024-03-26

## 2024-03-26 RX ORDER — SODIUM CHLORIDE 0.9 % (FLUSH) 0.9 %
3 SYRINGE (ML) INJECTION EVERY 8 HOURS
Status: DISCONTINUED | OUTPATIENT
Start: 2024-03-26 | End: 2024-03-27 | Stop reason: HOSPADM

## 2024-03-26 RX ORDER — FENTANYL CITRATE 50 UG/ML
25 INJECTION, SOLUTION INTRAMUSCULAR; INTRAVENOUS ONCE
Status: COMPLETED | OUTPATIENT
Start: 2024-03-26 | End: 2024-03-26

## 2024-03-26 RX ORDER — MAGNESIUM SULFATE IN WATER 40 MG/ML
2000 INJECTION, SOLUTION INTRAVENOUS ONCE
Status: COMPLETED | OUTPATIENT
Start: 2024-03-26 | End: 2024-03-26

## 2024-03-26 RX ORDER — BUPROPION HYDROCHLORIDE 150 MG/1
150 TABLET ORAL DAILY
Status: DISCONTINUED | OUTPATIENT
Start: 2024-03-26 | End: 2024-03-27 | Stop reason: HOSPADM

## 2024-03-26 RX ORDER — PANTOPRAZOLE SODIUM 40 MG/1
40 TABLET, DELAYED RELEASE ORAL DAILY
Status: DISCONTINUED | OUTPATIENT
Start: 2024-03-26 | End: 2024-03-27 | Stop reason: HOSPADM

## 2024-03-26 RX ORDER — ONDANSETRON 2 MG/ML
4 INJECTION INTRAMUSCULAR; INTRAVENOUS EVERY 4 HOURS PRN
Status: DISCONTINUED | OUTPATIENT
Start: 2024-03-26 | End: 2024-03-27 | Stop reason: HOSPADM

## 2024-03-26 RX ORDER — DIPHENHYDRAMINE HYDROCHLORIDE 50 MG/ML
25 INJECTION INTRAMUSCULAR; INTRAVENOUS ONCE
Status: COMPLETED | OUTPATIENT
Start: 2024-03-26 | End: 2024-03-26

## 2024-03-26 RX ORDER — POTASSIUM CHLORIDE 20 MEQ/1
40 TABLET, EXTENDED RELEASE ORAL PRN
Status: DISCONTINUED | OUTPATIENT
Start: 2024-03-26 | End: 2024-03-27 | Stop reason: HOSPADM

## 2024-03-26 RX ADMIN — MAGNESIUM SULFATE HEPTAHYDRATE 2000 MG: 40 INJECTION, SOLUTION INTRAVENOUS at 14:22

## 2024-03-26 RX ADMIN — POTASSIUM CHLORIDE 10 MEQ: 7.46 INJECTION, SOLUTION INTRAVENOUS at 12:58

## 2024-03-26 RX ADMIN — SODIUM CHLORIDE: 9 INJECTION, SOLUTION INTRAVENOUS at 21:48

## 2024-03-26 RX ADMIN — MORPHINE SULFATE 4 MG: 4 INJECTION INTRAVENOUS at 11:49

## 2024-03-26 RX ADMIN — IOPAMIDOL 75 ML: 755 INJECTION, SOLUTION INTRAVENOUS at 11:07

## 2024-03-26 RX ADMIN — DIPHENHYDRAMINE HYDROCHLORIDE 25 MG: 50 INJECTION, SOLUTION INTRAMUSCULAR; INTRAVENOUS at 13:23

## 2024-03-26 RX ADMIN — POTASSIUM CHLORIDE 10 MEQ: 7.46 INJECTION, SOLUTION INTRAVENOUS at 11:31

## 2024-03-26 RX ADMIN — SODIUM CHLORIDE 1000 ML: 9 INJECTION, SOLUTION INTRAVENOUS at 18:25

## 2024-03-26 RX ADMIN — FENTANYL CITRATE 25 MCG: 50 INJECTION INTRAMUSCULAR; INTRAVENOUS at 09:59

## 2024-03-26 RX ADMIN — METOCLOPRAMIDE 10 MG: 5 INJECTION, SOLUTION INTRAMUSCULAR; INTRAVENOUS at 09:59

## 2024-03-26 RX ADMIN — SODIUM CHLORIDE 1000 ML: 9 INJECTION, SOLUTION INTRAVENOUS at 10:02

## 2024-03-26 RX ADMIN — MORPHINE SULFATE 4 MG: 4 INJECTION INTRAVENOUS at 10:58

## 2024-03-26 RX ADMIN — PROCHLORPERAZINE EDISYLATE 10 MG: 5 INJECTION INTRAMUSCULAR; INTRAVENOUS at 13:23

## 2024-03-26 ASSESSMENT — PAIN DESCRIPTION - LOCATION: LOCATION: ABDOMEN

## 2024-03-26 ASSESSMENT — PAIN DESCRIPTION - ORIENTATION: ORIENTATION: LOWER

## 2024-03-26 ASSESSMENT — PAIN SCALES - GENERAL: PAINLEVEL_OUTOF10: 8

## 2024-03-26 ASSESSMENT — PAIN DESCRIPTION - DESCRIPTORS: DESCRIPTORS: DISCOMFORT

## 2024-03-26 ASSESSMENT — PAIN DESCRIPTION - PAIN TYPE: TYPE: ACUTE PAIN

## 2024-03-26 ASSESSMENT — PAIN - FUNCTIONAL ASSESSMENT: PAIN_FUNCTIONAL_ASSESSMENT: 0-10

## 2024-03-26 NOTE — ED NOTES
Report taken from previous shift RN. Pt introduced to writer. Pt resting in bed with call light in reach, respirations even and unlabored, NAD. Pt has no verbalized needs at this time. Whiteboards updated.     Pt instructed by rn to provide stool sample   No

## 2024-03-26 NOTE — ED NOTES
ED to inpatient nurses report      Chief Complaint:  Chief Complaint   Patient presents with    Abdominal Pain    Emesis     Present to ED from: [place]     MOA:     LOC: alert and orientated to name, place, date  Mobility: Independent  Oxygen Baseline: room air     Current needs required:    Pending ED orders: n/a   Present condition: resting in bed w  at bedside     Why did the patient come to the ED? Pt to ED with c/o lower abd cramping since 3am. Pt states last BM 3 days ago.EMS report 1 episode of vomiting PTA, 4mg Zofran given PTA.  What is the plan? Admit, pt is also having liquid diarrhea   Any procedures or intervention occur? Ed workup, GI panel   Any safety concerns??    Mental Status:  Level of Consciousness: Alert (0)    Psych Assessment:   Psychosocial  Psychosocial (WDL): Within Defined Limits  Vital signs   Vitals:    03/26/24 1526 03/26/24 1651 03/26/24 1654 03/26/24 1725   BP:       Pulse: 67 75 73 69   Resp:       Temp:       TempSrc:       SpO2: 92% 94% 95%    Weight:            Vitals:  Patient Vitals for the past 24 hrs:   BP Temp Temp src Pulse Resp SpO2 Weight   03/26/24 1725 -- -- -- 69 -- -- --   03/26/24 1654 -- -- -- 73 -- 95 % --   03/26/24 1651 -- -- -- 75 -- 94 % --   03/26/24 1526 -- -- -- 67 -- 92 % --   03/26/24 1523 -- -- -- -- 15 -- --   03/26/24 1438 -- -- -- 67 14 97 % --   03/26/24 1400 118/84 -- -- 88 19 99 % --   03/26/24 1355 (!) 125/92 -- -- 79 15 98 % --   03/26/24 1254 -- -- -- 71 16 94 % --   03/26/24 1215 (!) 146/87 -- -- 67 14 94 % --   03/26/24 1127 -- -- -- 66 14 95 % --   03/26/24 1100 -- -- -- 83 12 100 % --   03/26/24 1008 -- -- -- 63 12 96 % --   03/26/24 0948 -- 97.8 °F (36.6 °C) Oral 65 18 99 % --   03/26/24 0944 (!) 193/107 -- -- 67 18 100 % 63 kg (139 lb)      Visit Vitals  /84   Pulse 69   Temp 97.8 °F (36.6 °C) (Oral)   Resp 15   Wt 63 kg (139 lb)   SpO2 95%   BMI 24.62 kg/m²        LDAs:   Peripheral IV 03/26/24 Left;Posterior Hand (Active)  for the following components:       Result Value    Neutrophils % 82 (*)     Lymphocytes % 12 (*)     Immature Granulocytes 1 (*)     All other components within normal limits   COMPREHENSIVE METABOLIC PANEL - Abnormal; Notable for the following components:    Potassium 2.9 (*)     Chloride 110 (*)     Glucose 107 (*)     Calcium 7.8 (*)     Total Protein 5.9 (*)     ALT 9 (*)     All other components within normal limits   URINALYSIS WITH REFLEX TO CULTURE - Abnormal; Notable for the following components:    Color, UA Dark Yellow (*)     Ketones, Urine SMALL (*)     Specific Gravity, UA 1.068 (*)     Leukocyte Esterase, Urine TRACE (*)     All other components within normal limits   GASTROINTESTINAL PANEL, MOLECULAR   LIPASE   LACTIC ACID   POTASSIUM W/ REFLEX TO MAGNESIUM   MICROSCOPIC URINALYSIS       Electronically signed by Venessa Garcia RN on 3/26/2024 at 6:19 PM

## 2024-03-26 NOTE — ED PROVIDER NOTES
[facility]  Emergency Department Encounter  Emergency Medicine Resident     Pt Name:Merlyn Angulo  MRN: [id number]  Birthdate 1965  Date of evaluation: 3/26/24  PCP:  Ector Martini APRN - CNP    10:04 AM EDT     CHIEF COMPLAINT       Chief Complaint   Patient presents with    Abdominal Pain    Emesis       HISTORY OF PRESENT ILLNESS  (Location/Symptom, Timing/Onset, Context/Setting, Quality, Duration, Modifying Factors, Severity.)      Merlyn Angulo is a 58 y.o. female who presents with patient is here for abdominal pain and is left lower quadrant.  She has associated nausea and vomiting.  There is bloody stool however FOBT is negative at bedside during encounter.  Patient is having nonbilious nonbloody emesis.  She states she is been on Ozempic 0.5 Mg weekly since November 2023.    She states that the abdominal pain started yesterday and has progressed to worsening today.  She denies increase in size of her abdomen.  She denies dysuria urgency or frequency fever or chills or flank pain.  She states she has a sore throat from emesis.  She has been diagnosed with gastritis before and has follow-up with GI and is compliant with her pantoprazole medications.  She was brought in by EMS and was given oral Zofran prior to arrival.  She does work at [employer] and due to this abdominal pain her coworkers did call EMS for her today.  Her  is with her at bedside.    Patient is a good historian.    Denies intra-abdominal surgery.  Vaginal deliveries only.      PAST MEDICAL / SURGICAL / SOCIAL / FAMILY HISTORY      has a past medical history of Anxiety, ASCUS (atypical squamous cells of undetermined significance) on Pap smear, Depression, Endometriosis, GERD (gastroesophageal reflux disease), HTN (hypertension), Hyperlipidemia, Iron deficiency anemia, Menometrorrhagia, PONV (postoperative nausea and vomiting), and Sinus problem.       has a past surgical history that includes Colonoscopy  (2004); Upper gastrointestinal endoscopy; LEEP (2006); Endometrial biopsy (05/18/2012); laparoscopy (08/22/2012); Upper gastrointestinal endoscopy (2004); skin biopsy (Bilateral, 06/06/2001); Liposuction (10/18/2018); pr office/outpt visit,procedure only (N/A, 10/18/2018); Breast reduction surgery (Bilateral, 07/14/2020); Breast reduction surgery (Bilateral, 07/14/2020); US BREAST BIOPSY W LOC DEVICE 1ST LESION LEFT (Left, 06/04/2021); Colonoscopy (N/A, 08/10/2021); Upper gastrointestinal endoscopy (N/A, 08/10/2021); blepharoplasty (08/04/2022); and blepharoplasty (Bilateral, 8/4/2022).      Social History     Socioeconomic History    Marital status:      Spouse name: Not on file    Number of children: Not on file    Years of education: Not on file    Highest education level: Not on file   Occupational History    Not on file   Tobacco Use    Smoking status: Never    Smokeless tobacco: Never   Vaping Use    Vaping Use: Never used   Substance and Sexual Activity    Alcohol use: Yes     Comment: social    Drug use: No    Sexual activity: Yes     Partners: Male   Other Topics Concern    Not on file   Social History Narrative    Not on file     Social Determinants of Health     Financial Resource Strain: Low Risk  (5/30/2023)    Overall Financial Resource Strain (CARDIA)     Difficulty of Paying Living Expenses: Not hard at all   Food Insecurity: Not on file (5/30/2023)   Transportation Needs: Unknown (5/30/2023)    PRAPARE - Transportation     Lack of Transportation (Medical): Not on file     Lack of Transportation (Non-Medical): No   Physical Activity: Not on file   Stress: Not on file   Social Connections: Not on file   Intimate Partner Violence: Not on file   Housing Stability: Unknown (5/30/2023)    Housing Stability Vital Sign     Unable to Pay for Housing in the Last Year: Not on file     Number of Places Lived in the Last Year: Not on file     Unstable Housing in the Last Year: No       Family History

## 2024-03-26 NOTE — DISCHARGE INSTRUCTIONS
Call today or tomorrow to follow up with your PCP in the next few days.  If you cannot be seen and your symptoms are worsening please return to the ER for reevaluation    Avoid eating any spicy food and milk type products, drinks that have caffeine in it.  Use ibuprofen or Tylenol (unless prescribed medications that have Tylenol in it) for pain.  You can take over the counter Ibuprofen (advil) tablets (4 every 8 hours or 3 every 6 hours or 2 every 4 hours)    Return to the Emergency Department within 8 - 12 hours if you have any of the following: worsening of pain in your abdomen, no food sounds good to you, you continue to vomit, you develop a fever, pain goes to your back, or you have pain in the abdomen when going over a bump in the car or when you jump up and down, or you develop vaginal bleeding or discharge, or for any other concerns you may have.

## 2024-03-26 NOTE — ED PROVIDER NOTES
[facility]  Emergency Department  Faculty Attestation     I performed a history and physical examination of the patient and discussed management with the resident. I reviewed the resident’s note and agree with the documented findings and plan of care. Any areas of disagreement are noted on the chart. I was personally present for the key portions of any procedures. I have documented in the chart those procedures where I was not present during the key portions. I have reviewed the emergency nurses triage note. I agree with the chief complaint, past medical history, past surgical history, allergies, medications, social and family history as documented unless otherwise noted below.    For Physician Assistant/ Nurse Practitioner cases/documentation I have personally evaluated this patient and have completed at least one if not all key elements of the E/M (history, physical exam, and MDM). Additional findings are as noted.    Preliminary note started at 10:21 AM EDT    Primary Care Physician:  Ector Martini, LUPE - CNP    Screenings:  [unfilled]    CHIEF COMPLAINT       Chief Complaint   Patient presents with    Abdominal Pain    Emesis       RECENT VITALS:   BP (!) 193/107   Pulse 63   Temp 97.8 °F (36.6 °C) (Oral)   Resp 12   Wt 63 kg (139 lb)   LMP 07/29/2015 (Approximate)   SpO2 96%   BMI 24.62 kg/m²     LABS:  Labs Reviewed   CBC WITH AUTO DIFFERENTIAL   COMPREHENSIVE METABOLIC PANEL   LIPASE   LACTIC ACID   URINALYSIS WITH REFLEX TO CULTURE       Radiology  XR CHEST (2 VW)    (Results Pending)   CT ABDOMEN PELVIS W IV CONTRAST Additional Contrast? None    (Results Pending)       CRITICAL CARE: There was a high probability of clinically significant/life threatening deterioration in this patient's condition which required my urgent intervention.  Total critical care time was none minutes.  This excludes any time for separately reportable procedures.     EKG:  EKG

## 2024-03-26 NOTE — ED PROVIDER NOTES
Patient signed out by Dr. Castellanos at the completion of his shift.  Presented with c/o abdominal pain, nausea, vomiting, hematemesis, hematochezia, diarrhea. W/u complete-marked hypokalemia. CT concerning for colitis. Treatment and evaluation ongoing, disposition to be determined.      Pancho Gambino MD  03/26/24 2990    Repeat labs are improved however the patient remains relatively symptomatic and is constant we will pursue admission for continued treatment and evaluation.       Pancho Gambino MD  03/26/24 7768

## 2024-03-26 NOTE — ED PROVIDER NOTES
[facility] UNIT  Emergency Department  Emergency Medicine Sign-out     Care of Merlyn Angulo was assumed from Dr. Mobley and is being seen for Abdominal Pain and Emesis  .  The patient's initial evaluation and plan have been discussed with the prior attending who initially evaluated the patient.     EMERGENCY DEPARTMENT COURSE / MEDICAL DECISION MAKING:       MEDICATIONS GIVEN:  Orders Placed This Encounter   Medications    metoclopramide (REGLAN) injection 10 mg    DISCONTD: lactated ringers bolus 1,000 mL    sodium chloride flush 0.9 % injection 3 mL    fentaNYL (SUBLIMAZE) injection 25 mcg    sodium chloride 0.9 % bolus 1,000 mL    potassium bicarb-citric acid (EFFER-K) effervescent tablet 40 mEq    potassium chloride 10 mEq/100 mL IVPB (Peripheral Line)    morphine injection 4 mg    iopamidol (ISOVUE-370) 76 % injection 75 mL    morphine injection 4 mg    prochlorperazine (COMPAZINE) injection 10 mg    diphenhydrAMINE (BENADRYL) injection 25 mg    magnesium sulfate 2000 mg in 50 mL IVPB premix    sodium chloride 0.9 % bolus 1,000 mL    ondansetron (ZOFRAN) injection 4 mg    buPROPion (WELLBUTRIN XL) extended release tablet 150 mg    escitalopram (LEXAPRO) tablet 10 mg    pantoprazole (PROTONIX) tablet 40 mg    0.9 % sodium chloride infusion    sodium chloride flush 0.9 % injection 5-40 mL    sodium chloride flush 0.9 % injection 5-40 mL    0.9 % sodium chloride infusion    OR Linked Order Group     potassium chloride (KLOR-CON M) extended release tablet 40 mEq     potassium bicarb-citric acid (EFFER-K) effervescent tablet 40 mEq     potassium chloride 10 mEq/100 mL IVPB (Peripheral Line)    enoxaparin (LOVENOX) injection 40 mg     Order Specific Question:   Indication of Use     Answer:   Prophylaxis-DVT/PE    ondansetron (ZOFRAN) injection 4 mg       LABS / RADIOLOGY:     Labs Reviewed   CBC WITH AUTO DIFFERENTIAL - Abnormal; Notable for the following components:       Result Value    Neutrophils % 82  significant inflammation or bowel obstruction.  The remainder of the gastrointestinal tract is unremarkable. Pelvis: There is no free fluid.  The urinary bladder and uterus are unremarkable Peritoneum/Retroperitoneum: The abdominal aorta and iliac arteries are normal in caliber.There is no pathologic adenopathy. Bones/Soft Tissues: No acute abnormality     Findings concerning for colitis involving the ascending, transverse and proximal descending colon. Fluid within the colon is consistent with diarrheal illness     XR CHEST (2 VW)    Result Date: 3/26/2024  EXAMINATION: TWO XRAY VIEWS OF THE CHEST 3/26/2024 10:05 am COMPARISON: None. HISTORY: ORDERING SYSTEM PROVIDED HISTORY: nausea TECHNOLOGIST PROVIDED HISTORY: nausea Reason for Exam: emesis FINDINGS: The lungs are without acute focal process.  There is no effusion or pneumothorax. The cardiomediastinal silhouette is without acute process. The osseous structures are without acute process.     No acute process.       RECENT VITALS:     Temp: 97.7 °F (36.5 °C),  Pulse: 69, Respirations: 16, BP: 121/69, SpO2: 95 %    This patient is a 58 y.o. Female with LLQ abdominal  pain, vomiting x1 day.  On ozempiq for several months.  No abx  No sick contacts  Had severe diarrhea in ED  CT scan shows colitis    ED Course as of 03/26/24 2357   Tue Mar 26, 2024   1034 WBC: 9.7 [LL]   1034 Lactic Acid, Whole Blood: 1.1 [LL]   1034 WBC: 9.7 [LL]   1034 Hemoglobin Quant: 13.8 [LL]   1034 Platelet Count: 176 [LL]   1317 IMPRESSION:  Findings concerning for colitis involving the ascending, transverse and  proximal descending colon.     Fluid within the colon is consistent with diarrheal illness   [LL]   1818 On reevaluation, patient states she is still unsteady on her feet and is having nausea.  She is able to eat some crackers but is concerned that she may not be able to replenish fluids.  She still having a few episodes of diarrhea. Will admit for iv hydration, anti emetics and to

## 2024-03-26 NOTE — ED NOTES
Pt to ED with c/o lower abd cramping since 3am. Pt states last BM 3 days ago.EMS report 1 episode of vomiting PTA, 4mg Zofran given PTA.

## 2024-03-27 VITALS
TEMPERATURE: 98.2 F | HEART RATE: 76 BPM | SYSTOLIC BLOOD PRESSURE: 135 MMHG | RESPIRATION RATE: 16 BRPM | DIASTOLIC BLOOD PRESSURE: 82 MMHG | OXYGEN SATURATION: 96 % | WEIGHT: 139 LBS | BODY MASS INDEX: 24.62 KG/M2

## 2024-03-27 LAB
ANION GAP SERPL CALCULATED.3IONS-SCNC: 8 MMOL/L (ref 9–16)
BASOPHILS # BLD: <0.03 K/UL (ref 0–0.2)
BASOPHILS NFR BLD: 0 % (ref 0–2)
BUN SERPL-MCNC: 14 MG/DL (ref 6–20)
CALCIUM SERPL-MCNC: 8.1 MG/DL (ref 8.6–10.4)
CAMPYLOBACTER DNA SPEC NAA+PROBE: NORMAL
CHLORIDE SERPL-SCNC: 108 MMOL/L (ref 98–107)
CO2 SERPL-SCNC: 24 MMOL/L (ref 20–31)
CREAT SERPL-MCNC: 0.7 MG/DL (ref 0.5–0.9)
EKG ATRIAL RATE: 60 BPM
EKG P AXIS: 57 DEGREES
EKG P-R INTERVAL: 124 MS
EKG Q-T INTERVAL: 370 MS
EKG QRS DURATION: 84 MS
EKG QTC CALCULATION (BAZETT): 370 MS
EKG R AXIS: 30 DEGREES
EKG T AXIS: 88 DEGREES
EKG VENTRICULAR RATE: 60 BPM
EOSINOPHIL # BLD: <0.03 K/UL (ref 0–0.44)
EOSINOPHILS RELATIVE PERCENT: 0 % (ref 1–4)
ERYTHROCYTE [DISTWIDTH] IN BLOOD BY AUTOMATED COUNT: 14.1 % (ref 11.8–14.4)
ETEC ELTA+ESTB GENES STL QL NAA+PROBE: NORMAL
GFR SERPL CREATININE-BSD FRML MDRD: >90 ML/MIN/1.73M2
GLUCOSE SERPL-MCNC: 107 MG/DL (ref 74–99)
HCT VFR BLD AUTO: 37.8 % (ref 36.3–47.1)
HGB BLD-MCNC: 12.7 G/DL (ref 11.9–15.1)
IMM GRANULOCYTES # BLD AUTO: 0.03 K/UL (ref 0–0.3)
IMM GRANULOCYTES NFR BLD: 0 %
LYMPHOCYTES NFR BLD: 1.06 K/UL (ref 1.1–3.7)
LYMPHOCYTES RELATIVE PERCENT: 11 % (ref 24–43)
MCH RBC QN AUTO: 30.6 PG (ref 25.2–33.5)
MCHC RBC AUTO-ENTMCNC: 33.6 G/DL (ref 28.4–34.8)
MCV RBC AUTO: 91.1 FL (ref 82.6–102.9)
MONOCYTES NFR BLD: 0.76 K/UL (ref 0.1–1.2)
MONOCYTES NFR BLD: 8 % (ref 3–12)
NEUTROPHILS NFR BLD: 81 % (ref 36–65)
NEUTS SEG NFR BLD: 8.28 K/UL (ref 1.5–8.1)
NRBC BLD-RTO: 0 PER 100 WBC
P SHIGELLOIDES DNA STL QL NAA+PROBE: NORMAL
PLATELET # BLD AUTO: 168 K/UL (ref 138–453)
PMV BLD AUTO: 9.9 FL (ref 8.1–13.5)
POTASSIUM SERPL-SCNC: 4 MMOL/L (ref 3.7–5.3)
RBC # BLD AUTO: 4.15 M/UL (ref 3.95–5.11)
SALMONELLA DNA SPEC QL NAA+PROBE: NORMAL
SHIGA TOXIN STX GENE SPEC NAA+PROBE: NORMAL
SHIGELLA DNA SPEC QL NAA+PROBE: NORMAL
SODIUM SERPL-SCNC: 140 MMOL/L (ref 136–145)
SPECIMEN DESCRIPTION: NORMAL
V CHOL+PARA RFBL+TRKH+TNAA STL QL NAA+PR: NORMAL
WBC OTHER # BLD: 10.1 K/UL (ref 3.5–11.3)
Y ENTERO RECN STL QL NAA+PROBE: NORMAL

## 2024-03-27 PROCEDURE — 96376 TX/PRO/DX INJ SAME DRUG ADON: CPT

## 2024-03-27 PROCEDURE — 36415 COLL VENOUS BLD VENIPUNCTURE: CPT

## 2024-03-27 PROCEDURE — 93010 ELECTROCARDIOGRAM REPORT: CPT | Performed by: INTERNAL MEDICINE

## 2024-03-27 PROCEDURE — 85025 COMPLETE CBC W/AUTO DIFF WBC: CPT

## 2024-03-27 PROCEDURE — 80048 BASIC METABOLIC PNL TOTAL CA: CPT

## 2024-03-27 PROCEDURE — 6370000000 HC RX 637 (ALT 250 FOR IP): Performed by: STUDENT IN AN ORGANIZED HEALTH CARE EDUCATION/TRAINING PROGRAM

## 2024-03-27 PROCEDURE — G0378 HOSPITAL OBSERVATION PER HR: HCPCS

## 2024-03-27 PROCEDURE — 6370000000 HC RX 637 (ALT 250 FOR IP): Performed by: EMERGENCY MEDICINE

## 2024-03-27 PROCEDURE — 96361 HYDRATE IV INFUSION ADD-ON: CPT

## 2024-03-27 PROCEDURE — 6360000002 HC RX W HCPCS: Performed by: EMERGENCY MEDICINE

## 2024-03-27 RX ORDER — DICYCLOMINE HYDROCHLORIDE 10 MG/1
20 CAPSULE ORAL 4 TIMES DAILY PRN
Qty: 120 CAPSULE | Refills: 0 | Status: SHIPPED | OUTPATIENT
Start: 2024-03-27

## 2024-03-27 RX ORDER — CIPROFLOXACIN 500 MG/1
500 TABLET, FILM COATED ORAL EVERY 12 HOURS SCHEDULED
Qty: 20 TABLET | Refills: 0 | Status: SHIPPED | OUTPATIENT
Start: 2024-03-27 | End: 2024-03-27

## 2024-03-27 RX ORDER — CIPROFLOXACIN 500 MG/1
500 TABLET, FILM COATED ORAL EVERY 12 HOURS SCHEDULED
Status: DISCONTINUED | OUTPATIENT
Start: 2024-03-27 | End: 2024-03-27 | Stop reason: HOSPADM

## 2024-03-27 RX ORDER — FENTANYL CITRATE 50 UG/ML
25 INJECTION, SOLUTION INTRAMUSCULAR; INTRAVENOUS
Status: DISCONTINUED | OUTPATIENT
Start: 2024-03-27 | End: 2024-03-27 | Stop reason: HOSPADM

## 2024-03-27 RX ORDER — CIPROFLOXACIN 500 MG/1
500 TABLET, FILM COATED ORAL EVERY 12 HOURS SCHEDULED
Qty: 20 TABLET | Refills: 0 | Status: SHIPPED | OUTPATIENT
Start: 2024-03-27 | End: 2024-04-06

## 2024-03-27 RX ORDER — DICYCLOMINE HYDROCHLORIDE 10 MG/1
20 CAPSULE ORAL
Status: DISCONTINUED | OUTPATIENT
Start: 2024-03-27 | End: 2024-03-27

## 2024-03-27 RX ORDER — DICYCLOMINE HYDROCHLORIDE 10 MG/1
20 CAPSULE ORAL
Status: DISCONTINUED | OUTPATIENT
Start: 2024-03-27 | End: 2024-03-27 | Stop reason: HOSPADM

## 2024-03-27 RX ORDER — DICYCLOMINE HYDROCHLORIDE 10 MG/1
20 CAPSULE ORAL 4 TIMES DAILY PRN
Qty: 120 CAPSULE | Refills: 3 | Status: SHIPPED | OUTPATIENT
Start: 2024-03-27 | End: 2024-03-27

## 2024-03-27 RX ADMIN — PANTOPRAZOLE SODIUM 40 MG: 40 TABLET, DELAYED RELEASE ORAL at 08:59

## 2024-03-27 RX ADMIN — BUPROPION HYDROCHLORIDE 150 MG: 150 TABLET, EXTENDED RELEASE ORAL at 08:59

## 2024-03-27 RX ADMIN — DICYCLOMINE HYDROCHLORIDE 20 MG: 10 CAPSULE ORAL at 08:59

## 2024-03-27 RX ADMIN — CIPROFLOXACIN 500 MG: 500 TABLET ORAL at 08:59

## 2024-03-27 RX ADMIN — ESCITALOPRAM OXALATE 10 MG: 10 TABLET ORAL at 09:10

## 2024-03-27 RX ADMIN — FENTANYL CITRATE 25 MCG: 50 INJECTION INTRAMUSCULAR; INTRAVENOUS at 09:00

## 2024-03-27 ASSESSMENT — ENCOUNTER SYMPTOMS
NAUSEA: 1
VOMITING: 1
ABDOMINAL PAIN: 1

## 2024-03-27 ASSESSMENT — PAIN SCALES - GENERAL: PAINLEVEL_OUTOF10: 5

## 2024-03-27 NOTE — CARE COORDINATION
Case Management Assessment  Initial Evaluation    Date/Time of Evaluation: 3/27/2024 10:17 AM  Assessment Completed by: HECTOR BERGERON RN    If patient is discharged prior to next notation, then this note serves as note for discharge by case management.    Patient Name: Merlyn Angulo                   YOB: 1965  Diagnosis: Diarrhea [R19.7]  Abdominal pain, left lower quadrant [R10.32]  Hypokalemia [E87.6]  Colitis [K52.9]  Nausea and vomiting, unspecified vomiting type [R11.2]                   Date / Time: 3/26/2024  9:39 AM    Patient Admission Status: Observation   Readmission Risk (Low < 19, Mod (19-27), High > 27): No data recorded  Current PCP: Carol Martini APRN - CNP  PCP verified by CM? Yes (carol martini np)    Chart Reviewed: Yes      History Provided by: Patient  Patient Orientation: Alert and Oriented    Patient Cognition: Alert    Hospitalization in the last 30 days (Readmission):  No    If yes, Readmission Assessment in CM Navigator will be completed.    Advance Directives:      Code Status: Full Code   Patient's Primary Decision Maker is: Legal Next of Kin      Discharge Planning:    Patient lives with: Spouse/Significant Other Type of Home: House  Primary Care Giver: Self  Patient Support Systems include: Family Members   Current Financial resources: None  Current community resources: None  Current services prior to admission: None            Current DME:  none            Type of Home Care services:  None    ADLS  Prior functional level: Independent in ADLs/IADLs  Current functional level: Independent in ADLs/IADLs    PT AM-PAC:   /24  OT AM-PAC:   /24    Family can provide assistance at DC: Other (comment) (independent)  Would you like Case Management to discuss the discharge plan with any other family members/significant others, and if so, who?    Plans to Return to Present Housing: Yes  Other Identified Issues/Barriers to RETURNING to current housing: none  Potential

## 2024-03-27 NOTE — H&P
[facility]  CDU / OBSERVATION ENCOUNTER  ATTENDING NOTE     Pt Name: Merlyn Angulo  MRN: [id number]  Birthdate 1965  Date of evaluation: 3/27/24  Patient's PCP is :  Ector Martini APRN - [place]    CHIEF COMPLAINT       Chief Complaint   Patient presents with    Abdominal Pain    Emesis         HISTORY OF PRESENT ILLNESS    Merlyn Angulo is a 58 y.o. female who presents patient with abdominal pain with left lower quadrant discomfort.  Patient has associated nausea vomiting.  Patient describes bloody stool but testing was negative.  Patient is with nonbloody nonbilious emesis.  Patient was with pain starting day prior to presentation and worsening the day of presentation.  She describes an increase size to her abdomen.  She denies urgency frequency or chills.  Patient has a sore throat from vomiting.  She has been diagnosed with gastritis before and has follow-up with GI.  Patient has been compliant with Protonix.  Patient was brought in by EMS and given Zofran prior to arrival.  She works at [employer] and due to this abdominal pain coworkers called EMS today.  Patient denies intra-abdominal surgery.    Location/Symptom: Abdominal pain and cramping  Timing/Onset: Over the last 2 days  Provocation: Unclear  Quality: Cramping abdominal pain  Radiation: None  Severity: Moderate to severe  Timing/Duration: Days  Modifying Factors: Unclear    History was obtained in part through review of the ED chart. When possible, a direct discussion was had with ED nurses, residents, and attendings  REVIEW OF SYSTEMS        General ROS - No fevers, No malaise   Ophthalmic ROS - No discharge, No changes in vision  ENT ROS -  No sore throat, No rhinorrhea,   Respiratory ROS - no shortness of breath, no cough, no  wheezing  Cardiovascular ROS - No chest pain, no dyspnea on exertion  Gastrointestinal ROS -abdominal pain, nausea vomiting, unclear etiology  Genito-Urinary ROS - No dysuria, trouble voiding,  flush 0.9 % injection 5-40 mL, 2 times per day  sodium chloride flush 0.9 % injection 5-40 mL, PRN  0.9 % sodium chloride infusion, PRN  potassium chloride (KLOR-CON M) extended release tablet 40 mEq, PRN   Or  potassium bicarb-citric acid (EFFER-K) effervescent tablet 40 mEq, PRN   Or  potassium chloride 10 mEq/100 mL IVPB (Peripheral Line), PRN  enoxaparin (LOVENOX) injection 40 mg, Daily  ondansetron (ZOFRAN) injection 4 mg, Q4H PRN        All medication charted and reviewed.    ALLERGIES     is allergic to codeine, oxycodone-acetaminophen, percocet [oxycodone-acetaminophen], keflex [cephalexin], and simvastatin.      FAMILY HISTORY     She indicated that her mother is alive. She indicated that her father is alive. She indicated that her maternal grandmother is . She indicated that her maternal grandfather is . She indicated that her paternal grandmother is alive. She indicated that her paternal grandfather is .     family history includes Cancer in her maternal grandmother; Colon Cancer (age of onset: 65) in her paternal grandmother; Heart Disease in her father and maternal grandfather; Uterine Cancer in her paternal grandmother.  The patient denies any pertinent family history.  I have reviewed and agree with the family history entered.  I have reviewed the Family History and it is not significant to the case    SOCIAL HISTORY      reports that she has never smoked. She has never used smokeless tobacco. She reports current alcohol use. She reports that she does not use drugs.  I have reviewed and agree with all Social.  There are no   concerns for substance abuse/use.    PHYSICAL EXAM     INITIAL VITALS:  weight is 63 kg (139 lb). Her temporal temperature is 97.7 °F (36.5 °C). Her blood pressure is 121/69 and her pulse is 69. Her respiration is 16 and oxygen saturation is 95%.      CONSTITUTIONAL: AOx4, no apparent distress, appears stated age patient appears uncomfortable   HEAD:

## 2024-03-27 NOTE — PLAN OF CARE
Pt denies pain at discharge.  Printed discharge instructions given and explained to pt.  Pt relates understanding and cooperation.

## 2024-03-27 NOTE — DISCHARGE SUMMARY
CDU Discharge Summary        Patient:  Merlyn Angulo  YOB: 1965    MRN: [id number]   Acct: [id number]    Primary Care Physician: Ector Martini APRN - CNP    Admit date:  3/26/2024  9:39 AM  Discharge date: 3/27/2023    Discharge Diagnoses:     Acute colitis, cause unspecified  Improved with IV hydration, medications    Follow-up:  Call today/tomorrow for a follow up appointment with Ector Martini APRN - CNP , or return to the Emergency Room with worsening symptoms    Stressed to patient the importance of following up with primary care doctor for further workup/management of symptoms.  Pt verbalizes understanding and agrees with plan.    Discharge Medications:  Changes to medications            Medication List        START taking these medications      ciprofloxacin 500 MG tablet  Commonly known as: CIPRO  Take 1 tablet by mouth every 12 hours for 10 days     dicyclomine 10 MG capsule  Commonly known as: BENTYL  Take 2 capsules by mouth 4 times daily as needed (stomach cramping)            CONTINUE taking these medications      buPROPion 150 MG extended release tablet  Commonly known as: WELLBUTRIN XL  TAKE ONE TABLET BY MOUTH DAILY     cyanocobalamin 1000 MCG/ML injection     escitalopram 10 MG tablet  Commonly known as: LEXAPRO  Take 1 tablet by mouth daily     hydroCHLOROthiazide 25 MG tablet  Commonly known as: HYDRODIURIL  Take one tablet by mouth daily     LORazepam 0.5 MG tablet  Commonly known as: ATIVAN  TAKE ONE TABLET BY MOUTH DAILY AS NEEDED FOR ANXIETY FOR UP TO 60 DAYS     pantoprazole 40 MG tablet  Commonly known as: PROTONIX  Take 1 tablet by mouth daily     SEMAGLUTIDE PO               Where to Get Your Medications        These medications were sent to [facility] - [place], [place] - [address] [place] [address] - P [phone] - F [phone]  [address] [place] [address]      Phone: [phone]   ciprofloxacin 500 MG tablet  dicyclomine 10 MG capsule

## 2024-03-27 NOTE — PROGRESS NOTES
[facility]  CDU / OBSERVATION ENCOUNTER  INTERVAL NOTE       In a routine reassessment of the patient I have found the following:      Patient was admitted for abdominal pain as well as nausea and vomiting.  Patient describes bloody stool however testing was negative.  Patient also describes nonbloody nonbilious vomiting.  She reports that her pain is mostly in the left lower quadrant.  CT abdomen pelvis was performed in the ED with evidence concerning of colitis involving the ascending, transverse as well as proximal descending colon.  Patient reports that she is feeling better.  She was able to have breakfast and lunch.  Patient would like to be discharged home today as she is symptomatically better compared to admission.    Chest is clear bilaterally, oral mucosa moist, abdomen soft, mildly tender, mostly tender in the left lower quadrant.  Patient reports improvement in nausea.  No episodes of vomiting since admission.    The Family history, social history, and ROS are effectively unchanged since admission unless noted elsewhere in the chart.    The patient has been updated on the plan of care.  The patient is agreeable with the current plan.    Bernardino Gomez MD

## 2024-04-02 ENCOUNTER — TELEPHONE (OUTPATIENT)
Dept: FAMILY MEDICINE CLINIC | Age: 59
End: 2024-04-02

## 2024-04-02 NOTE — TELEPHONE ENCOUNTER
Care Transitions Initial Follow Up Call    Outreach made within 2 business days of discharge: YES    Patient: Merlyn Angulo Patient : 1965   MRN: [id number]  Reason for Admission: There are no discharge diagnoses documented for the most recent discharge.  Discharge Date: 3/27/24       Spoke with: patient    Discharge department/facility: [facility] Interactive Patient Contact:  Was patient able to fill all prescriptions: Yes  Was patient instructed to bring all medications to the follow-up visit: Yes  Is patient taking all medications as directed in the discharge summary? Yes  Does patient understand their discharge instructions: Yes  Does patient have questions or concerns that need addressed prior to 7-14 day follow up office visit: no    Scheduled appointment with PCP within 7-14 days    Follow Up  Future Appointments   Date Time Provider Department Center   2024  2:30 PM Ector Martini, APRN - CNP [id number]       Merlyn Arenas MA

## 2024-04-09 ENCOUNTER — OFFICE VISIT (OUTPATIENT)
Dept: FAMILY MEDICINE CLINIC | Age: 59
End: 2024-04-09

## 2024-04-09 VITALS
OXYGEN SATURATION: 99 % | HEART RATE: 73 BPM | WEIGHT: 138 LBS | BODY MASS INDEX: 24.45 KG/M2 | DIASTOLIC BLOOD PRESSURE: 70 MMHG | SYSTOLIC BLOOD PRESSURE: 120 MMHG

## 2024-04-09 DIAGNOSIS — E87.6 HYPOKALEMIA: Primary | ICD-10-CM

## 2024-04-09 DIAGNOSIS — Z09 HOSPITAL DISCHARGE FOLLOW-UP: ICD-10-CM

## 2024-04-09 NOTE — PROGRESS NOTES
Patient is present for f/u from . [facility]  Patient was in the hospital 3/26-3/27 for colitis

## 2024-04-09 NOTE — PROGRESS NOTES
Post-Discharge Transitional Care  Follow Up      Merlyn Angulo   YOB: 1965    Date of Office Visit:  4/9/2024  Date of Hospital Admission: 3/26/24  Date of Hospital Discharge: 3/27/24  Risk of hospital readmission (high >=14%. Medium >=10%) :No data recorded    Care management risk score Rising risk (score 2-5) and Complex Care (Scores >=6): No Risk Score On File     Non face to face  following discharge, date last encounter closed (first attempt may have been earlier): *No documented post hospital discharge outreach found in the last 14 days    Call initiated 2 business days of discharge: *No response recorded in the last 14 days    ASSESSMENT/PLAN:   Hypokalemia  -     Basic Metabolic Panel; Future  Hospital discharge follow-up  -     [place] DISCHARGE MEDS RECONCILED W/ CURRENT OUTPATIENT MED LIST      Medical Decision Making: moderate complexity  No follow-ups on file.           Subjective:   HPI:  Follow up of Hospital problems/diagnosis(es):   Per dc summary:  Merlyn Angulo originally presented to the hospital on 3/26/2024  9:39 AM. with abdominal pain, nausea, vomiting, diarrhea.  At that time it was determined that She required further observation and symptomatic management. She was admitted and labs and imaging were followed daily.  Imaging results as above.  Patient was able to tolerate oral intake, she was able to have breakfast and lunch without difficulty.  Reports improvement in diarrhea, nausea, vomiting and abdominal pain.  She is medically stable to be discharged     Merlyn, states it took a little while for her to feel back to normal.  She is back to baseline.  She had been on semaglutide but she has stopped this.     Inpatient course: Discharge summary reviewed- see chart.      Patient Active Problem List   Diagnosis    Anxiety    Depression    GERD (gastroesophageal reflux disease)    Hyperlipidemia    Anemia    Endometriosis    ASCUS (atypical squamous cells of undetermined significance) on

## 2024-04-14 ENCOUNTER — PATIENT MESSAGE (OUTPATIENT)
Dept: FAMILY MEDICINE CLINIC | Age: 59
End: 2024-04-14

## 2024-04-14 DIAGNOSIS — R41.840 CONCENTRATION DEFICIT: Primary | ICD-10-CM

## 2024-04-15 NOTE — TELEPHONE ENCOUNTER
From: Merlyn Angulo  To: Ector Martini  Sent: 4/14/2024 2:04 PM EDT  Subject: Referral    I forgot to ask for a name of a psychiatrist. I want to get tested for add. Thank you

## 2024-04-29 DIAGNOSIS — Z76.0 MEDICATION REFILL: ICD-10-CM

## 2024-04-29 RX ORDER — ESCITALOPRAM OXALATE 10 MG/1
5 TABLET ORAL DAILY
Qty: 45 TABLET | OUTPATIENT
Start: 2024-04-29

## 2024-05-20 ENCOUNTER — HOSPITAL ENCOUNTER (OUTPATIENT)
Age: 59
Discharge: HOME | End: 2024-05-20
Payer: COMMERCIAL

## 2024-05-20 DIAGNOSIS — E87.6 HYPOKALEMIA: ICD-10-CM

## 2024-05-20 LAB
ANION GAP SERPL CALCULATED.3IONS-SCNC: 10 MMOL/L (ref 9–16)
BUN SERPL-MCNC: 14 MG/DL (ref 6–20)
CALCIUM SERPL-MCNC: 9.4 MG/DL (ref 8.6–10.4)
CHLORIDE SERPL-SCNC: 103 MMOL/L (ref 98–107)
CO2 SERPL-SCNC: 29 MMOL/L (ref 20–31)
CREAT SERPL-MCNC: 0.8 MG/DL (ref 0.5–0.9)
GFR, ESTIMATED: 89 ML/MIN/1.73M2
GLUCOSE SERPL-MCNC: 100 MG/DL (ref 74–99)
POTASSIUM SERPL-SCNC: 3.5 MMOL/L (ref 3.7–5.3)
SODIUM SERPL-SCNC: 142 MMOL/L (ref 136–145)

## 2024-05-20 PROCEDURE — 80048 BASIC METABOLIC PNL TOTAL CA: CPT

## 2024-05-20 PROCEDURE — 36415 COLL VENOUS BLD VENIPUNCTURE: CPT

## 2024-05-24 ENCOUNTER — PATIENT MESSAGE (OUTPATIENT)
Dept: FAMILY MEDICINE CLINIC | Age: 59
End: 2024-05-24

## 2024-05-24 DIAGNOSIS — E87.6 HYPOKALEMIA: ICD-10-CM

## 2024-05-28 DIAGNOSIS — E87.6 HYPOKALEMIA: Primary | ICD-10-CM

## 2024-05-28 DIAGNOSIS — Z76.0 MEDICATION REFILL: ICD-10-CM

## 2024-05-28 RX ORDER — PANTOPRAZOLE SODIUM 40 MG/1
40 TABLET, DELAYED RELEASE ORAL DAILY
Qty: 90 TABLET | Refills: 1 | OUTPATIENT
Start: 2024-05-28

## 2024-05-28 RX ORDER — POTASSIUM CHLORIDE 750 MG/1
10 TABLET, EXTENDED RELEASE ORAL DAILY
Qty: 90 TABLET | Refills: 1 | Status: SHIPPED | OUTPATIENT
Start: 2024-05-28 | End: 2024-05-28 | Stop reason: SDUPTHER

## 2024-05-28 NOTE — TELEPHONE ENCOUNTER
From: Merlyn Angulo  To: cEtor Martini  Sent: 5/24/2024 9:27 PM EDT  Subject: Potassium     I was told to take potassium 10 meq over the counter. Everything I look for online says you need a prescription. Is there another type of potassium I should be looking for?

## 2024-05-29 RX ORDER — POTASSIUM CHLORIDE 750 MG/1
10 TABLET, EXTENDED RELEASE ORAL DAILY
Qty: 90 TABLET | Refills: 1 | Status: SHIPPED | OUTPATIENT
Start: 2024-05-29

## 2024-06-03 ENCOUNTER — TELEPHONE (OUTPATIENT)
Dept: FAMILY MEDICINE CLINIC | Age: 59
End: 2024-06-03

## 2024-06-03 NOTE — TELEPHONE ENCOUNTER
Patient contacted office in regards to potassium. She stated [facility] does not have script.    Spoke to [facility], script was \"filled 5/28 at [facility]\". [facility] and ready for , advised to contact [facility] and have them put script back and transfer to [facility]

## 2024-06-06 ENCOUNTER — TELEMEDICINE (OUTPATIENT)
Dept: FAMILY MEDICINE CLINIC | Age: 59
End: 2024-06-06
Payer: COMMERCIAL

## 2024-06-06 DIAGNOSIS — R23.2 VASOMOTOR FLUSHING: ICD-10-CM

## 2024-06-06 DIAGNOSIS — J02.9 SORE THROAT: Primary | ICD-10-CM

## 2024-06-06 PROCEDURE — 99213 OFFICE O/P EST LOW 20 MIN: CPT | Performed by: NURSE PRACTITIONER

## 2024-06-06 RX ORDER — FEZOLINETANT 45 MG/1
45 TABLET, FILM COATED ORAL DAILY
Qty: 90 TABLET | Refills: 1 | Status: SHIPPED | OUTPATIENT
Start: 2024-06-06

## 2024-06-06 SDOH — ECONOMIC STABILITY: FOOD INSECURITY: WITHIN THE PAST 12 MONTHS, THE FOOD YOU BOUGHT JUST DIDN'T LAST AND YOU DIDN'T HAVE MONEY TO GET MORE.: NEVER TRUE

## 2024-06-06 SDOH — ECONOMIC STABILITY: FOOD INSECURITY: WITHIN THE PAST 12 MONTHS, YOU WORRIED THAT YOUR FOOD WOULD RUN OUT BEFORE YOU GOT MONEY TO BUY MORE.: NEVER TRUE

## 2024-06-06 SDOH — ECONOMIC STABILITY: INCOME INSECURITY: HOW HARD IS IT FOR YOU TO PAY FOR THE VERY BASICS LIKE FOOD, HOUSING, MEDICAL CARE, AND HEATING?: NOT HARD AT ALL

## 2024-06-06 ASSESSMENT — ENCOUNTER SYMPTOMS
COUGH: 0
SHORTNESS OF BREATH: 0

## 2024-06-06 NOTE — PROGRESS NOTES
Merlyn Angulo, was evaluated through a synchronous (real-time) audio-video encounter. The patient (or guardian if applicable) is aware that this is a billable service, which includes applicable co-pays. This Virtual Visit was conducted with patient's (and/or legal guardian's) consent. Patient identification was verified, and a caregiver was present when appropriate.   The patient was located at Home: [address] [place] [id number]  Provider was located at Home (Appt Dept State): [place]  Confirm you are appropriately licensed, registered, or certified to deliver care in the state where the patient is located as indicated above. If you are not or unsure, please re-schedule the visit: Yes, I confirm.     Merlyn Angulo (:  1965) is a Established patient, presenting virtually for evaluation of the following:    Assessment & Plan   Below is the assessment and plan developed based on review of pertinent history, physical exam, labs, studies, and medications.  1. Sore throat  Is going to go to urgent care.     2. Vasomotor flushing  -     Hepatic Function Panel; Future  -     Hepatic Function Panel; Future  -     Hepatic Function Panel; Future    No follow-ups on file.       Subjective   HPI  Sore throat and swollen gland on the right side. She is going to  stop at urgent care to be swabbed for strep.  She is unable to come into the office today.  No problems swallowing or talking.     Hot sweats nightly.  Happen multiple times a night. Have been going on for years.  She has a friend on [employer] and she would like to try this.  Liver function is wnl.      Review of Systems   Constitutional:  Negative for chills and fever.   Respiratory:  Negative for cough and shortness of breath.    Cardiovascular:  Negative for chest pain, palpitations and leg swelling.           Objective   Patient-Reported Vitals  No data recorded     Physical Exam  [INSTRUCTIONS:  \"[x]\" Indicates a positive item  \"[]\" Indicates a negative item  --

## 2024-06-06 NOTE — PROGRESS NOTES
Patient is present for 2mo f/u    States she also has a sore throat  States the right side is swollen  States this started yesterday morning  States she feels warm and does have a headache

## 2024-07-08 RX ORDER — FEZOLINETANT 45 MG/1
45 TABLET, FILM COATED ORAL DAILY
Qty: 90 TABLET | Refills: 1 | Status: SHIPPED | OUTPATIENT
Start: 2024-07-08

## 2024-07-08 NOTE — TELEPHONE ENCOUNTER
Received a fax from [facility].  They are needing the script to be sent to [facility].    Script pending.  Fax scanned and attached.

## 2024-08-20 RX ORDER — POTASSIUM CHLORIDE 750 MG/1
10 TABLET, FILM COATED, EXTENDED RELEASE ORAL DAILY
Qty: 90 TABLET | Refills: 2 | Status: SHIPPED | OUTPATIENT
Start: 2024-08-20

## 2024-08-20 NOTE — TELEPHONE ENCOUNTER
of skin     Essential hypertension     Encounter for cosmetic procedure     Hypertrophy of breast     Bilateral mastodynia     Chronic bilateral thoracic back pain     Acute cervical myofascial strain     Rash, skin     Ptosis of both eyelids     Functional visual field loss     Dermatochalasis of both upper and lower eyelid of right eye     Dermatochalasis of both upper and lower eyelid of left eye     Obesity (BMI 30.0-34.9)     Diarrhea

## 2024-09-04 ENCOUNTER — HOSPITAL ENCOUNTER (OUTPATIENT)
Age: 59
Discharge: HOME | End: 2024-09-04
Payer: COMMERCIAL

## 2024-09-04 DIAGNOSIS — Z76.0 MEDICATION REFILL: ICD-10-CM

## 2024-09-04 DIAGNOSIS — R23.2 VASOMOTOR FLUSHING: ICD-10-CM

## 2024-09-04 DIAGNOSIS — E87.6 HYPOKALEMIA: ICD-10-CM

## 2024-09-04 LAB
ALBUMIN SERPL-MCNC: 4.8 G/DL (ref 3.5–5.2)
ALBUMIN/GLOB SERPL: 2 {RATIO} (ref 1–2.5)
ALP SERPL-CCNC: 55 U/L (ref 35–104)
ALT SERPL-CCNC: 13 U/L (ref 10–35)
AST SERPL-CCNC: 23 U/L (ref 10–35)
BILIRUB DIRECT SERPL-MCNC: <0.1 MG/DL (ref 0–0.2)
BILIRUB INDIRECT SERPL-MCNC: NORMAL MG/DL (ref 0–1)
BILIRUB SERPL-MCNC: 0.3 MG/DL (ref 0–1.2)
GLOBULIN SER CALC-MCNC: 2.6 G/DL
POTASSIUM SERPL-SCNC: 4.4 MMOL/L (ref 3.7–5.3)
PROT SERPL-MCNC: 7.4 G/DL (ref 6.6–8.7)

## 2024-09-04 PROCEDURE — 84132 ASSAY OF SERUM POTASSIUM: CPT

## 2024-09-04 PROCEDURE — 36415 COLL VENOUS BLD VENIPUNCTURE: CPT

## 2024-09-04 PROCEDURE — 80076 HEPATIC FUNCTION PANEL: CPT

## 2024-09-04 RX ORDER — PANTOPRAZOLE SODIUM 40 MG/1
40 TABLET, DELAYED RELEASE ORAL DAILY
Qty: 90 TABLET | Refills: 2 | Status: SHIPPED | OUTPATIENT
Start: 2024-09-04

## 2024-09-06 ENCOUNTER — OFFICE VISIT (OUTPATIENT)
Dept: FAMILY MEDICINE CLINIC | Age: 59
End: 2024-09-06
Payer: COMMERCIAL

## 2024-09-06 VITALS
DIASTOLIC BLOOD PRESSURE: 80 MMHG | SYSTOLIC BLOOD PRESSURE: 124 MMHG | WEIGHT: 139 LBS | OXYGEN SATURATION: 98 % | BODY MASS INDEX: 24.62 KG/M2 | HEART RATE: 68 BPM

## 2024-09-06 DIAGNOSIS — Z76.0 MEDICATION REFILL: ICD-10-CM

## 2024-09-06 DIAGNOSIS — F41.9 CHRONIC ANXIETY: ICD-10-CM

## 2024-09-06 DIAGNOSIS — R09.81 SINUS CONGESTION: Primary | ICD-10-CM

## 2024-09-06 PROCEDURE — 3074F SYST BP LT 130 MM HG: CPT | Performed by: NURSE PRACTITIONER

## 2024-09-06 PROCEDURE — 99214 OFFICE O/P EST MOD 30 MIN: CPT | Performed by: NURSE PRACTITIONER

## 2024-09-06 PROCEDURE — 3079F DIAST BP 80-89 MM HG: CPT | Performed by: NURSE PRACTITIONER

## 2024-09-06 RX ORDER — BUPROPION HYDROCHLORIDE 150 MG/1
TABLET ORAL
Qty: 90 TABLET | Refills: 1 | Status: SHIPPED | OUTPATIENT
Start: 2024-09-06

## 2024-09-06 RX ORDER — CETIRIZINE HYDROCHLORIDE 10 MG/1
10 TABLET ORAL DAILY
Qty: 14 TABLET | Refills: 0 | Status: SHIPPED | OUTPATIENT
Start: 2024-09-06

## 2024-09-06 RX ORDER — HYDROCHLOROTHIAZIDE 25 MG/1
TABLET ORAL
Qty: 90 TABLET | Refills: 1 | Status: SHIPPED | OUTPATIENT
Start: 2024-09-06

## 2024-09-06 RX ORDER — LORAZEPAM 0.5 MG/1
TABLET ORAL
Qty: 30 TABLET | Refills: 1 | Status: SHIPPED | OUTPATIENT
Start: 2024-09-06 | End: 2025-04-02

## 2024-09-06 ASSESSMENT — ENCOUNTER SYMPTOMS
COUGH: 0
SHORTNESS OF BREATH: 0

## 2024-09-06 NOTE — PROGRESS NOTES
Merlyn Angulo (:  1965) is a 58 y.o. female,Established patient, here for evaluation of the following chief complaint(s):  Medication Refill      Assessment & Plan   ASSESSMENT/PLAN:  1. Sinus congestion  -     cetirizine (ZYRTEC ALLERGY) 10 MG tablet; Take 1 tablet by mouth daily, Disp-14 tablet, R-0Normal  2. Medication refill  -     buPROPion (WELLBUTRIN XL) 150 MG extended release tablet; TAKE ONE TABLET BY MOUTH DAILY, Disp-90 tablet, R-1Normal  -     hydroCHLOROthiazide (HYDRODIURIL) 25 MG tablet; Take one tablet by mouth daily, Disp-90 tablet, R-1Normal  3. Chronic anxiety  -     LORazepam (ATIVAN) 0.5 MG tablet; TAKE ONE TABLET BY MOUTH DAILY AS NEEDED FOR ANXIETY FOR UP TO 60 DAYS, Disp-30 tablet, R-1Normal      Return in about 4 months (around 2025).         Subjective   SUBJECTIVE/OBJECTIVE:  Medication Refill  Pertinent negatives include no chest pain, chills, coughing or fever.   Sinus issues for two months.  Nose gets plugged, she gets pressure.  She is using otc nasal spray. Nasal spray helps a little.     Veozah is helping. She thinks it keeps her up though.     Ativan is just here and there- #30 has lasted since May.     Review of Systems   Constitutional:  Negative for chills and fever.   Respiratory:  Negative for cough and shortness of breath.    Cardiovascular:  Negative for chest pain, palpitations and leg swelling.          Objective   Vitals:    24 1232   BP: 124/80   Pulse: 68   SpO2: 98%     Wt Readings from Last 3 Encounters:   24 63 kg (139 lb)   24 62.6 kg (138 lb)   24 63 kg (139 lb)       Physical Exam  Constitutional:       Appearance: She is well-developed.   HENT:      Right Ear: External ear normal.      Left Ear: External ear normal.      Nose: Nose normal.   Cardiovascular:      Rate and Rhythm: Normal rate and regular rhythm.      Heart sounds: Normal heart sounds, S1 normal and S2 normal.   Pulmonary:      Effort: Pulmonary effort is normal.

## 2024-09-11 DIAGNOSIS — R09.81 SINUS CONGESTION: ICD-10-CM

## 2024-09-11 RX ORDER — CETIRIZINE HYDROCHLORIDE 10 MG/1
10 TABLET ORAL DAILY
Qty: 14 TABLET | Refills: 3 | OUTPATIENT
Start: 2024-09-11

## 2024-09-23 DIAGNOSIS — Z76.0 MEDICATION REFILL: ICD-10-CM

## 2024-09-23 RX ORDER — ESCITALOPRAM OXALATE 10 MG/1
10 TABLET ORAL DAILY
Qty: 90 TABLET | Refills: 2 | Status: SHIPPED | OUTPATIENT
Start: 2024-09-23

## 2024-12-04 DIAGNOSIS — F41.9 CHRONIC ANXIETY: ICD-10-CM

## 2024-12-05 RX ORDER — LORAZEPAM 0.5 MG/1
TABLET ORAL
Qty: 30 TABLET | Refills: 1 | Status: SHIPPED | OUTPATIENT
Start: 2024-12-05 | End: 2025-02-03

## 2025-01-03 ENCOUNTER — OFFICE VISIT (OUTPATIENT)
Dept: FAMILY MEDICINE CLINIC | Age: 60
End: 2025-01-03
Payer: COMMERCIAL

## 2025-01-03 VITALS
HEART RATE: 76 BPM | SYSTOLIC BLOOD PRESSURE: 134 MMHG | DIASTOLIC BLOOD PRESSURE: 84 MMHG | OXYGEN SATURATION: 98 % | WEIGHT: 135 LBS | HEIGHT: 63 IN | BODY MASS INDEX: 23.92 KG/M2

## 2025-01-03 DIAGNOSIS — Z86.0100 HISTORY OF COLON POLYPS: ICD-10-CM

## 2025-01-03 DIAGNOSIS — F41.9 ANXIETY: ICD-10-CM

## 2025-01-03 DIAGNOSIS — F43.9 SITUATIONAL STRESS: Primary | ICD-10-CM

## 2025-01-03 PROCEDURE — 3079F DIAST BP 80-89 MM HG: CPT | Performed by: NURSE PRACTITIONER

## 2025-01-03 PROCEDURE — 3075F SYST BP GE 130 - 139MM HG: CPT | Performed by: NURSE PRACTITIONER

## 2025-01-03 PROCEDURE — 99214 OFFICE O/P EST MOD 30 MIN: CPT | Performed by: NURSE PRACTITIONER

## 2025-01-03 RX ORDER — FEZOLINETANT 45 MG/1
45 TABLET, FILM COATED ORAL DAILY
Qty: 90 TABLET | Refills: 1 | Status: SHIPPED | OUTPATIENT
Start: 2025-01-03

## 2025-01-03 ASSESSMENT — PATIENT HEALTH QUESTIONNAIRE - PHQ9
5. POOR APPETITE OR OVEREATING: NOT AT ALL
4. FEELING TIRED OR HAVING LITTLE ENERGY: NOT AT ALL
6. FEELING BAD ABOUT YOURSELF - OR THAT YOU ARE A FAILURE OR HAVE LET YOURSELF OR YOUR FAMILY DOWN: NOT AT ALL
3. TROUBLE FALLING OR STAYING ASLEEP: NOT AT ALL
9. THOUGHTS THAT YOU WOULD BE BETTER OFF DEAD, OR OF HURTING YOURSELF: NOT AT ALL
SUM OF ALL RESPONSES TO PHQ QUESTIONS 1-9: 0
SUM OF ALL RESPONSES TO PHQ9 QUESTIONS 1 & 2: 0
8. MOVING OR SPEAKING SO SLOWLY THAT OTHER PEOPLE COULD HAVE NOTICED. OR THE OPPOSITE, BEING SO FIGETY OR RESTLESS THAT YOU HAVE BEEN MOVING AROUND A LOT MORE THAN USUAL: NOT AT ALL
7. TROUBLE CONCENTRATING ON THINGS, SUCH AS READING THE NEWSPAPER OR WATCHING TELEVISION: NOT AT ALL
10. IF YOU CHECKED OFF ANY PROBLEMS, HOW DIFFICULT HAVE THESE PROBLEMS MADE IT FOR YOU TO DO YOUR WORK, TAKE CARE OF THINGS AT HOME, OR GET ALONG WITH OTHER PEOPLE: NOT DIFFICULT AT ALL
SUM OF ALL RESPONSES TO PHQ QUESTIONS 1-9: 0
2. FEELING DOWN, DEPRESSED OR HOPELESS: NOT AT ALL
SUM OF ALL RESPONSES TO PHQ QUESTIONS 1-9: 0
1. LITTLE INTEREST OR PLEASURE IN DOING THINGS: NOT AT ALL
SUM OF ALL RESPONSES TO PHQ QUESTIONS 1-9: 0

## 2025-01-03 ASSESSMENT — ENCOUNTER SYMPTOMS
SHORTNESS OF BREATH: 0
COUGH: 0

## 2025-01-06 ENCOUNTER — TELEPHONE (OUTPATIENT)
Dept: GASTROENTEROLOGY | Age: 60
End: 2025-01-06

## 2025-01-15 ENCOUNTER — TELEPHONE (OUTPATIENT)
Dept: GASTROENTEROLOGY | Age: 60
End: 2025-01-15

## 2025-01-15 NOTE — TELEPHONE ENCOUNTER
Patient is requesting a call regarding scheduling a colonoscopy with referral and can be reached at [phone] . Okay to leave a message.

## 2025-01-30 ENCOUNTER — TELEPHONE (OUTPATIENT)
Dept: FAMILY MEDICINE CLINIC | Age: 60
End: 2025-01-30

## 2025-01-30 NOTE — TELEPHONE ENCOUNTER
Received call from [facility]. They are checking status of prior auth. Informed PA was sent, we just received additional paperwork today regarding it    Additional information in MA basket to be completed

## 2025-01-31 NOTE — TELEPHONE ENCOUNTER
Received fax for medication Veozah stating:   Please provide chart notes documenting diagnosis and all previous therapies including trial/failure of all formulary alternatives (estradiol, paroxetine mesylate, progesterone) or a letter of medical necessity documenting rationale for why there all cannot be trialed.     BLANK FORM SCANNED IN WAITING FOR ADDENDED OFFICE NOTE,

## 2025-02-01 DIAGNOSIS — F41.9 CHRONIC ANXIETY: ICD-10-CM

## 2025-02-03 RX ORDER — LORAZEPAM 0.5 MG/1
TABLET ORAL
Qty: 30 TABLET | Refills: 1 | Status: SHIPPED | OUTPATIENT
Start: 2025-02-03 | End: 2025-04-04

## 2025-02-03 NOTE — TELEPHONE ENCOUNTER
Last visit: 1/3/25  Last Med refill: 12/5/24  Does patient have enough medication for 72 hours: No:     Next Visit Date:  Future Appointments   Date Time Provider Department Center   2/20/2025  9:00 AM Maverick Chung MD [facility]   5/2/2025  9:00 AM Ector Martini, APRN - CNP [facility]       Health Maintenance   Topic Date Due    HIV screen  Never done    Hepatitis C screen  Never done    Hepatitis B vaccine (1 of 3 - 19+ 3-dose series) Never done    Shingles vaccine (1 of 2) Never done    COVID-19 Vaccine (4 - 2023-24 season) 09/01/2024    Flu vaccine (1) 09/06/2025 (Originally 8/1/2024)    Breast cancer screen  09/26/2025    Depression Monitoring  01/03/2026    Cervical cancer screen  05/04/2026    Lipids  07/21/2028    Colorectal Cancer Screen  08/10/2031    DTaP/Tdap/Td vaccine (2 - Td or Tdap) 11/28/2032    Hepatitis A vaccine  Aged Out    Hib vaccine  Aged Out    Polio vaccine  Aged Out    Meningococcal (ACWY) vaccine  Aged Out    Pneumococcal 0-64 years Vaccine  Aged Out    Diabetes screen  Discontinued       Hemoglobin A1C (%)   Date Value   11/28/2022 5.4   05/22/2015 5.0             ( goal A1C is < 7)   No components found for: \"LABMICR\"  No components found for: \"LDLCHOLESTEROL\", \"LDLCALC\"    (goal LDL is <100)   AST (U/L)   Date Value   09/04/2024 23     ALT (U/L)   Date Value   09/04/2024 13     BUN (mg/dL)   Date Value   05/20/2024 14     BP Readings from Last 3 Encounters:   01/03/25 134/84   09/06/24 124/80   04/09/24 120/70          (goal 120/80)    All Future Testing planned in [facility]  Lab Frequency Next Occurrence   Hepatic Function Panel Once 12/06/2024   Hepatic Function Panel Once 03/06/2025               Patient Active Problem List:     Anxiety     Depression     GERD (gastroesophageal reflux disease)     Hyperlipidemia     Anemia     Endometriosis     ASCUS (atypical squamous cells of undetermined significance) on Pap smear     Neoplasm of uncertain

## 2025-02-12 ENCOUNTER — TELEPHONE (OUTPATIENT)
Dept: FAMILY MEDICINE CLINIC | Age: 60
End: 2025-02-12

## 2025-02-12 NOTE — TELEPHONE ENCOUNTER
Spoke with patient and she stated she does not go through her insurance for this medication. States she uses a speciality pharmacy and has a mahogany for it.  Informed her I will disregard any forms we get from her insurance for the medication.

## 2025-02-12 NOTE — TELEPHONE ENCOUNTER
Magalie called in regards to needing additional info for Veozah PA.  States they received the chart notes but it does not state that patient has tried all formulary alternatives (estradiol, paroxetine mesylate, progesterone).  States they will need a letter of medical necessity documenting why these medications cannot be tried.

## 2025-03-12 DIAGNOSIS — Z76.0 MEDICATION REFILL: ICD-10-CM

## 2025-03-13 RX ORDER — BUPROPION HYDROCHLORIDE 150 MG/1
150 TABLET ORAL DAILY
Qty: 90 TABLET | Refills: 1 | Status: SHIPPED | OUTPATIENT
Start: 2025-03-13

## 2025-03-13 RX ORDER — HYDROCHLOROTHIAZIDE 25 MG/1
25 TABLET ORAL DAILY
Qty: 90 TABLET | Refills: 1 | Status: SHIPPED | OUTPATIENT
Start: 2025-03-13

## 2025-03-13 NOTE — TELEPHONE ENCOUNTER
Last visit: 1/3/25  Last Med refill: 9/6/24  Does patient have enough medication for 72 hours: No:     Next Visit Date:  Future Appointments   Date Time Provider Department Center   5/1/2025  8:45 AM Maverick Chung MD [facility]   5/2/2025  9:00 AM Ector Martini, APRN - CNP [facility] DEP       Health Maintenance   Topic Date Due    HIV screen  Never done    Hepatitis C screen  Never done    Hepatitis B vaccine (1 of 3 - 19+ 3-dose series) Never done    Shingles vaccine (1 of 2) Never done    Pneumococcal 50+ years Vaccine (1 of 1 - PCV) Never done    COVID-19 Vaccine (4 - 2024-25 season) 09/01/2024    Flu vaccine (1) 09/06/2025 (Originally 8/1/2024)    Breast cancer screen  09/26/2025    Depression Monitoring  01/03/2026    Cervical cancer screen  05/04/2026    Lipids  07/21/2028    Colorectal Cancer Screen  08/10/2031    DTaP/Tdap/Td vaccine (2 - Td or Tdap) 11/28/2032    Hepatitis A vaccine  Aged Out    Hib vaccine  Aged Out    Polio vaccine  Aged Out    Meningococcal (ACWY) vaccine  Aged Out    Meningococcal B vaccine  Aged Out    Diabetes screen  Discontinued       Hemoglobin A1C (%)   Date Value   11/28/2022 5.4   05/22/2015 5.0             ( goal A1C is < 7)   No components found for: \"LABMICR\"  No components found for: \"LDLCHOLESTEROL\", \"LDLCALC\"    (goal LDL is <100)   AST (U/L)   Date Value   09/04/2024 23     ALT (U/L)   Date Value   09/04/2024 13     BUN (mg/dL)   Date Value   05/20/2024 14     BP Readings from Last 3 Encounters:   01/03/25 134/84   09/06/24 124/80   04/09/24 120/70          (goal 120/80)    All Future Testing planned in [facility]  Lab Frequency Next Occurrence   Hepatic Function Panel Once 12/06/2024   Hepatic Function Panel Once 03/06/2025               Patient Active Problem List:     Anxiety     Depression     GERD (gastroesophageal reflux disease)     Hyperlipidemia     Anemia     Endometriosis     ASCUS (atypical squamous cells of undetermined

## 2025-04-10 DIAGNOSIS — F41.9 CHRONIC ANXIETY: ICD-10-CM

## 2025-04-10 NOTE — TELEPHONE ENCOUNTER
Last visit: 1/03/2025   Last Med refill: 3/12/2025  Does patient have enough medication for 72 hours: unknown, electronic request    Next Visit Date:  Future Appointments   Date Time Provider Department Center   5/1/2025  8:45 AM Maverick Chung MD [facility]   5/2/2025  9:00 AM Ector Martini, APRN - CNP [facility] DEP       Health Maintenance   Topic Date Due    HIV screen  Never done    Hepatitis C screen  Never done    Hepatitis B vaccine (1 of 3 - 19+ 3-dose series) Never done    Shingles vaccine (1 of 2) Never done    Pneumococcal 50+ years Vaccine (1 of 1 - PCV) Never done    COVID-19 Vaccine (4 - 2024-25 season) 09/01/2024    Flu vaccine (Season Ended) 09/06/2025 (Originally 8/1/2025)    Breast cancer screen  09/26/2025    Depression Monitoring  01/03/2026    Cervical cancer screen  05/04/2026    Lipids  07/21/2028    Colorectal Cancer Screen  08/10/2031    DTaP/Tdap/Td vaccine (2 - Td or Tdap) 11/28/2032    Hepatitis A vaccine  Aged Out    Hib vaccine  Aged Out    Polio vaccine  Aged Out    Meningococcal (ACWY) vaccine  Aged Out    Meningococcal B vaccine  Aged Out    Diabetes screen  Discontinued       Hemoglobin A1C (%)   Date Value   11/28/2022 5.4   05/22/2015 5.0             ( goal A1C is < 7)   No components found for: \"LABMICR\"  No components found for: \"LDLCHOLESTEROL\", \"LDLCALC\"    (goal LDL is <100)   AST (U/L)   Date Value   09/04/2024 23     ALT (U/L)   Date Value   09/04/2024 13     BUN (mg/dL)   Date Value   05/20/2024 14     BP Readings from Last 3 Encounters:   01/03/25 134/84   09/06/24 124/80   04/09/24 120/70          (goal 120/80)    All Future Testing planned in [facility]  Lab Frequency Next Occurrence   Hepatic Function Panel Once 12/06/2024   Hepatic Function Panel Once 03/06/2025               Patient Active Problem List:     Anxiety     Depression     GERD (gastroesophageal reflux disease)     Hyperlipidemia     Anemia     Endometriosis     ASCUS (atypical

## 2025-04-11 RX ORDER — LORAZEPAM 0.5 MG/1
0.5 TABLET ORAL DAILY PRN
Qty: 30 TABLET | Refills: 2 | Status: SHIPPED | OUTPATIENT
Start: 2025-04-11 | End: 2025-07-10

## 2025-05-01 ENCOUNTER — OFFICE VISIT (OUTPATIENT)
Dept: GASTROENTEROLOGY | Age: 60
End: 2025-05-01
Payer: COMMERCIAL

## 2025-05-01 VITALS
HEIGHT: 63 IN | OXYGEN SATURATION: 96 % | DIASTOLIC BLOOD PRESSURE: 80 MMHG | WEIGHT: 139 LBS | BODY MASS INDEX: 24.63 KG/M2 | HEART RATE: 77 BPM | SYSTOLIC BLOOD PRESSURE: 118 MMHG | TEMPERATURE: 97 F

## 2025-05-01 DIAGNOSIS — K59.04 CHRONIC IDIOPATHIC CONSTIPATION: ICD-10-CM

## 2025-05-01 DIAGNOSIS — Z86.0101 HISTORY OF ADENOMATOUS POLYP OF COLON: ICD-10-CM

## 2025-05-01 DIAGNOSIS — K21.9 GASTROESOPHAGEAL REFLUX DISEASE, UNSPECIFIED WHETHER ESOPHAGITIS PRESENT: Primary | ICD-10-CM

## 2025-05-01 PROCEDURE — 3074F SYST BP LT 130 MM HG: CPT | Performed by: STUDENT IN AN ORGANIZED HEALTH CARE EDUCATION/TRAINING PROGRAM

## 2025-05-01 PROCEDURE — 3079F DIAST BP 80-89 MM HG: CPT | Performed by: STUDENT IN AN ORGANIZED HEALTH CARE EDUCATION/TRAINING PROGRAM

## 2025-05-01 PROCEDURE — 99204 OFFICE O/P NEW MOD 45 MIN: CPT | Performed by: STUDENT IN AN ORGANIZED HEALTH CARE EDUCATION/TRAINING PROGRAM

## 2025-05-01 RX ORDER — POLYETHYLENE GLYCOL 3350, SODIUM SULFATE ANHYDROUS, SODIUM BICARBONATE, SODIUM CHLORIDE, POTASSIUM CHLORIDE 236; 22.74; 6.74; 5.86; 2.97 G/4L; G/4L; G/4L; G/4L; G/4L
4 POWDER, FOR SOLUTION ORAL ONCE
Qty: 4000 ML | Refills: 0 | Status: SHIPPED | OUTPATIENT
Start: 2025-05-01 | End: 2025-05-01

## 2025-05-01 RX ORDER — BISACODYL 5 MG
TABLET, DELAYED RELEASE (ENTERIC COATED) ORAL
Qty: 4 TABLET | Refills: 0 | Status: SHIPPED | OUTPATIENT
Start: 2025-05-01

## 2025-05-01 RX ORDER — SENNOSIDES 8.6 MG
2 TABLET ORAL DAILY PRN
Qty: 30 TABLET | Refills: 0 | Status: SHIPPED | OUTPATIENT
Start: 2025-05-01

## 2025-05-01 RX ORDER — SENNOSIDES A AND B 8.6 MG/1
2 TABLET, FILM COATED ORAL DAILY PRN
Qty: 60 TABLET | Refills: 0 | Status: CANCELLED | OUTPATIENT
Start: 2025-05-01 | End: 2026-05-01

## 2025-05-01 NOTE — TELEPHONE ENCOUNTER
Procedure scheduled/Dr Chung  Procedure: colon egd   Dx: gerd / history of adenomatous polyp of colon  Date: 7/1/25  Time: 8:30 a.m.  Hospital: [facility] phone call: TBD   Bowel Prep instructions given: Golytely / Mell  In office/via phone: office  Clearance needed: no  GLP - 1:  no

## 2025-05-01 NOTE — PROGRESS NOTES
Reason for Referral:     No referring provider defined for this encounter.    Chief Complaint   Patient presents with    Colonoscopy     egd       1. Gastroesophageal reflux disease, unspecified whether esophagitis present    2. History of adenomatous polyp of colon    3. Chronic idiopathic constipation        HISTORY OF PRESENT ILLNESS: Ms.Lisa Angulo is a 59 y.o. female with a past history remarkable for hypertension, history of colon polyp, referred as new consultation for evaluation of GERD and history of polyps.    Patient is overdue for her colonoscopy as she had polyps in 2021 with suboptimal prep.  She has intermittent constipation and some itching in the anal area.  She used to have more severe constipation however after probiotics it has improved.  She denies any diarrhea, blood in stool, dark stool.  Regarding upper GI symptoms she has GERD and states she sometimes notices blood after brushing her teeth not sure if it is coming from her esophagus.  She also has upper abdominal burning and bloating.  She denies dysphagia, nausea, vomiting.       [facility]  Grandma colon cancer     SH  No smoking  Social drinking once a week lots of aundrea    Previous Endoscopies  8/2021  EGD multiple FGPs removed    7 mm ascensing polyp- TA  Erythema mucosa- bx neg  Suboptimal prep  Repeat 3 years     Previous GI workup   3/24 Findings concerning for colitis involving the ascending, transverse and  proximal descending colon.      Patient's PMH/PSH,SH,PSYCH Hx, MEDs, ALLERGIES, and ROS were all reviewed and updated in the appropriate sections.  I did review all the labs results available for the labs which were ordered by the primary care physician, and the other consultants, we search on [facility] at [facility] and all the available care everywhere epic  I did review all the imaging studies of the abdomen available on EMR, ordered by the primary care physician and the other consultant  I did review all the pathology from the biopsies

## 2025-05-12 DIAGNOSIS — Z76.0 MEDICATION REFILL: ICD-10-CM

## 2025-05-12 NOTE — TELEPHONE ENCOUNTER
Last visit: 01/03/2025  Last Med refill: 09/23/2024  Does patient have enough medication for 72 hours: Yes    Next Visit Date:  Future Appointments   Date Time Provider Department Center   5/16/2025  9:00 AM Ector Martini, APRN - CNP [facility] DE[facility]   6/17/2025 12:30 PM [facility] PAT RM 5 [facility] PAT  [place]       Health Maintenance   Topic Date Due    HIV screen  Never done    Hepatitis C screen  Never done    Hepatitis B vaccine (1 of 3 - 19+ 3-dose series) Never done    Shingles vaccine (1 of 2) Never done    Pneumococcal 50+ years Vaccine (1 of 1 - PCV) Never done    COVID-19 Vaccine (4 - 2024-25 season) 09/01/2024    Flu vaccine (Season Ended) 09/06/2025 (Originally 8/1/2025)    Breast cancer screen  09/26/2025    Depression Monitoring  01/03/2026    Cervical cancer screen  05/04/2026    Lipids  07/21/2028    Colorectal Cancer Screen  08/10/2031    DTaP/Tdap/Td vaccine (2 - Td or Tdap) 11/28/2032    Hepatitis A vaccine  Aged Out    Hib vaccine  Aged Out    Polio vaccine  Aged Out    Meningococcal (ACWY) vaccine  Aged Out    Meningococcal B vaccine  Aged Out    Diabetes screen  Discontinued       Hemoglobin A1C (%)   Date Value   11/28/2022 5.4   05/22/2015 5.0             ( goal A1C is < 7)   No components found for: \"LABMICR\"  No components found for: \"LDLCHOLESTEROL\", \"LDLCALC\"    (goal LDL is <100)   AST (U/L)   Date Value   09/04/2024 23     ALT (U/L)   Date Value   09/04/2024 13     BUN (mg/dL)   Date Value   05/20/2024 14     BP Readings from Last 3 Encounters:   05/01/25 118/80   01/03/25 134/84   09/06/24 124/80          (goal 120/80)    All Future Testing planned in [facility]  Lab Frequency Next Occurrence   Hepatic Function Panel Once 12/06/2024   Hepatic Function Panel Once 03/06/2025   EGD Routine Once 05/08/2025   COLONOSCOPY (Diagnostic) Once 05/08/2025               Patient Active Problem List:     Anxiety     Depression     GERD (gastroesophageal reflux disease)     Hyperlipidemia

## 2025-05-13 RX ORDER — ESCITALOPRAM OXALATE 10 MG/1
10 TABLET ORAL DAILY
Qty: 90 TABLET | Refills: 1 | Status: SHIPPED | OUTPATIENT
Start: 2025-05-13

## 2025-05-13 RX ORDER — POTASSIUM CHLORIDE 750 MG/1
10 TABLET, EXTENDED RELEASE ORAL DAILY
Qty: 90 TABLET | Refills: 1 | Status: SHIPPED | OUTPATIENT
Start: 2025-05-13

## 2025-05-13 SDOH — ECONOMIC STABILITY: INCOME INSECURITY: IN THE LAST 12 MONTHS, WAS THERE A TIME WHEN YOU WERE NOT ABLE TO PAY THE MORTGAGE OR RENT ON TIME?: NO

## 2025-05-13 SDOH — ECONOMIC STABILITY: FOOD INSECURITY: WITHIN THE PAST 12 MONTHS, THE FOOD YOU BOUGHT JUST DIDN'T LAST AND YOU DIDN'T HAVE MONEY TO GET MORE.: NEVER TRUE

## 2025-05-13 SDOH — ECONOMIC STABILITY: FOOD INSECURITY: WITHIN THE PAST 12 MONTHS, YOU WORRIED THAT YOUR FOOD WOULD RUN OUT BEFORE YOU GOT MONEY TO BUY MORE.: NEVER TRUE

## 2025-05-16 ENCOUNTER — OFFICE VISIT (OUTPATIENT)
Dept: FAMILY MEDICINE CLINIC | Age: 60
End: 2025-05-16

## 2025-05-16 VITALS
DIASTOLIC BLOOD PRESSURE: 80 MMHG | OXYGEN SATURATION: 98 % | HEART RATE: 68 BPM | SYSTOLIC BLOOD PRESSURE: 124 MMHG | BODY MASS INDEX: 24.98 KG/M2 | WEIGHT: 141 LBS

## 2025-05-16 DIAGNOSIS — Z13.29 SCREENING FOR THYROID DISORDER: Primary | ICD-10-CM

## 2025-05-16 DIAGNOSIS — Z13.21 ENCOUNTER FOR VITAMIN DEFICIENCY SCREENING: ICD-10-CM

## 2025-05-16 DIAGNOSIS — Z79.899 MEDICATION MANAGEMENT: ICD-10-CM

## 2025-05-16 ASSESSMENT — ENCOUNTER SYMPTOMS
COUGH: 0
SHORTNESS OF BREATH: 0

## 2025-05-16 NOTE — PROGRESS NOTES
Merlyn Angulo (:  1965) is a 59 y.o. female,Established patient, here for evaluation of the following chief complaint(s):  Follow-up (4mo follow up)    Assessment & Plan  Screening for thyroid disorder       Orders:    TSH; Future    Encounter for vitamin deficiency screening       Orders:    Vitamin D 25 Hydroxy; Future    Vitamin B12 & Folate; Future    Medication management       Orders:    CBC; Future    Comprehensive Metabolic Panel; Future      No follow-ups on file.       Subjective   HPI  Feels she is so much more tired than normal. Would like lab work done.   Not sleeping well due to menopause.   Her insurance won't cover veozah for her hot flashes- when she was on this it was helping her so much, she was sleeping- she says it was \"like night and day\". Does follow with gyno regularly.       Review of Systems   Constitutional:  Negative for chills and fever.   Respiratory:  Negative for cough and shortness of breath.    Cardiovascular:  Negative for chest pain, palpitations and leg swelling.        Objective   Vitals:    25 0814   BP: 124/80   Pulse: 68   SpO2: 98%     Wt Readings from Last 3 Encounters:   25 64 kg (141 lb)   25 63 kg (139 lb)   25 61.2 kg (135 lb)       Physical Exam  Constitutional:       Appearance: She is well-developed.   HENT:      Right Ear: External ear normal.      Left Ear: External ear normal.      Nose: Nose normal.   Cardiovascular:      Rate and Rhythm: Normal rate and regular rhythm.      Heart sounds: Normal heart sounds, S1 normal and S2 normal.   Pulmonary:      Effort: Pulmonary effort is normal. No respiratory distress.      Breath sounds: Normal breath sounds.   Musculoskeletal:         General: No deformity. Normal range of motion.      Cervical back: Full passive range of motion without pain and normal range of motion.   Skin:     General: Skin is warm and dry.   Neurological:      Mental Status: She is alert and oriented to person,

## 2025-06-12 DIAGNOSIS — Z76.0 MEDICATION REFILL: ICD-10-CM

## 2025-06-12 NOTE — TELEPHONE ENCOUNTER
Last visit: 5/16/25  Last Med refill: 9/4/24  Does patient have enough medication for 72 hours: No:     Next Visit Date:  Future Appointments   Date Time Provider Department Center   6/17/2025 12:30 PM [facility] PAT RM 5 [facility] PAT [facility]   8/15/2025  8:00 AM Ector Martini, APRN - CNP [facility] DEP       Health Maintenance   Topic Date Due    HIV screen  Never done    Hepatitis C screen  Never done    Hepatitis B vaccine (1 of 3 - 19+ 3-dose series) Never done    Shingles vaccine (1 of 2) Never done    Pneumococcal 50+ years Vaccine (1 of 1 - PCV) Never done    COVID-19 Vaccine (4 - 2024-25 season) 09/01/2024    Flu vaccine (Season Ended) 09/06/2025 (Originally 8/1/2025)    Breast cancer screen  09/26/2025    Depression Monitoring  01/03/2026    Cervical cancer screen  05/04/2026    Lipids  07/21/2028    Colorectal Cancer Screen  08/10/2031    DTaP/Tdap/Td vaccine (2 - Td or Tdap) 11/28/2032    Hepatitis A vaccine  Aged Out    Hib vaccine  Aged Out    Polio vaccine  Aged Out    Meningococcal (ACWY) vaccine  Aged Out    Meningococcal B vaccine  Aged Out    Diabetes screen  Discontinued       Hemoglobin A1C (%)   Date Value   11/28/2022 5.4   05/22/2015 5.0             ( goal A1C is < 7)   No components found for: \"LABMICR\"  No components found for: \"LDLCHOLESTEROL\", \"LDLCALC\"    (goal LDL is <100)   AST (U/L)   Date Value   09/04/2024 23     ALT (U/L)   Date Value   09/04/2024 13     BUN (mg/dL)   Date Value   05/20/2024 14     BP Readings from Last 3 Encounters:   05/16/25 124/80   05/01/25 118/80   01/03/25 134/84          (goal 120/80)    All Future Testing planned in [facility]  Lab Frequency Next Occurrence   EGD Routine Once 05/08/2025   COLONOSCOPY (Diagnostic) Once 05/08/2025   CBC Once 05/16/2025   Comprehensive Metabolic Panel Once 05/16/2025   TSH Once 05/16/2025   Vitamin D 25 Hydroxy Once 05/16/2025   Vitamin B12 & Folate Once 05/16/2025               Patient Active Problem List:

## 2025-06-17 ENCOUNTER — HOSPITAL ENCOUNTER (OUTPATIENT)
Dept: PREADMISSION TESTING | Age: 60
Discharge: HOME | End: 2025-06-21

## 2025-06-17 VITALS — HEIGHT: 63 IN | WEIGHT: 135 LBS | BODY MASS INDEX: 23.92 KG/M2

## 2025-06-17 DIAGNOSIS — Z76.0 MEDICATION REFILL: ICD-10-CM

## 2025-06-17 RX ORDER — PANTOPRAZOLE SODIUM 40 MG/1
40 TABLET, DELAYED RELEASE ORAL DAILY
Qty: 90 TABLET | Refills: 2 | OUTPATIENT
Start: 2025-06-17

## 2025-06-17 NOTE — PROGRESS NOTES

## 2025-06-18 RX ORDER — PANTOPRAZOLE SODIUM 40 MG/1
40 TABLET, DELAYED RELEASE ORAL DAILY
Qty: 90 TABLET | Refills: 1 | Status: SHIPPED | OUTPATIENT
Start: 2025-06-18

## 2025-06-27 NOTE — PRE-PROCEDURE INSTRUCTIONS
Have you received your Prep?YES Any questions with prep instructions?N/A  Only Clear Liquid Diet day before.PATIENT TO FOLLOW DR LOPEZ INSTRUCTIONS  Nothing to eat after midnight day before procedure.PATIENT TO FOLLOW DR LOPEZ INSTRUCTIONS  Are you taking any blood thinners? NOIf so, you need to Stop.N/A  Remove any jewelry and body piercings.INSTRUCTIONS  Are you having any Covid symptoms?NO  Do you have any new rashes, infections, etc. that we should be aware of?NO  Do you have a ride home the day of surgery?YES It cannot be a cab or medical transportation.  Verify surgery time/date and what time to arrive at hospital. 0630

## 2025-06-30 ENCOUNTER — ANESTHESIA EVENT (OUTPATIENT)
Dept: ENDOSCOPY | Age: 60
End: 2025-06-30
Payer: COMMERCIAL

## 2025-07-01 ENCOUNTER — ANESTHESIA (OUTPATIENT)
Dept: ENDOSCOPY | Age: 60
End: 2025-07-01
Payer: COMMERCIAL

## 2025-07-01 ENCOUNTER — HOSPITAL ENCOUNTER (OUTPATIENT)
Age: 60
Setting detail: OUTPATIENT SURGERY
Discharge: HOME | End: 2025-07-01
Attending: STUDENT IN AN ORGANIZED HEALTH CARE EDUCATION/TRAINING PROGRAM | Admitting: STUDENT IN AN ORGANIZED HEALTH CARE EDUCATION/TRAINING PROGRAM
Payer: COMMERCIAL

## 2025-07-01 VITALS
HEIGHT: 63 IN | OXYGEN SATURATION: 98 % | TEMPERATURE: 98 F | DIASTOLIC BLOOD PRESSURE: 79 MMHG | SYSTOLIC BLOOD PRESSURE: 130 MMHG | WEIGHT: 135 LBS | BODY MASS INDEX: 23.92 KG/M2 | RESPIRATION RATE: 17 BRPM | HEART RATE: 70 BPM

## 2025-07-01 DIAGNOSIS — Z86.0101 HISTORY OF ADENOMATOUS POLYP OF COLON: ICD-10-CM

## 2025-07-01 DIAGNOSIS — K21.9 GASTROESOPHAGEAL REFLUX DISEASE, UNSPECIFIED WHETHER ESOPHAGITIS PRESENT: ICD-10-CM

## 2025-07-01 PROBLEM — K63.5 POLYP OF SIGMOID COLON: Status: ACTIVE | Noted: 2025-07-01

## 2025-07-01 PROBLEM — K22.9 IRREGULAR Z LINE OF ESOPHAGUS: Status: ACTIVE | Noted: 2025-07-01

## 2025-07-01 PROBLEM — K64.8 INTERNAL AND EXTERNAL HEMORRHOIDS WITHOUT COMPLICATION: Status: ACTIVE | Noted: 2025-07-01

## 2025-07-01 PROBLEM — K64.4 INTERNAL AND EXTERNAL HEMORRHOIDS WITHOUT COMPLICATION: Status: ACTIVE | Noted: 2025-07-01

## 2025-07-01 PROBLEM — K29.70 GASTRITIS WITHOUT BLEEDING: Status: ACTIVE | Noted: 2025-07-01

## 2025-07-01 PROBLEM — K31.7 POLYP OF STOMACH: Status: ACTIVE | Noted: 2025-07-01

## 2025-07-01 PROCEDURE — 7100000011 HC PHASE II RECOVERY - ADDTL 15 MIN: Performed by: STUDENT IN AN ORGANIZED HEALTH CARE EDUCATION/TRAINING PROGRAM

## 2025-07-01 PROCEDURE — 7100000010 HC PHASE II RECOVERY - FIRST 15 MIN: Performed by: STUDENT IN AN ORGANIZED HEALTH CARE EDUCATION/TRAINING PROGRAM

## 2025-07-01 PROCEDURE — 6360000002 HC RX W HCPCS: Performed by: NURSE ANESTHETIST, CERTIFIED REGISTERED

## 2025-07-01 PROCEDURE — 3700000001 HC ADD 15 MINUTES (ANESTHESIA): Performed by: STUDENT IN AN ORGANIZED HEALTH CARE EDUCATION/TRAINING PROGRAM

## 2025-07-01 PROCEDURE — 3700000000 HC ANESTHESIA ATTENDED CARE: Performed by: STUDENT IN AN ORGANIZED HEALTH CARE EDUCATION/TRAINING PROGRAM

## 2025-07-01 PROCEDURE — 88305 TISSUE EXAM BY PATHOLOGIST: CPT

## 2025-07-01 PROCEDURE — 2709999900 HC NON-CHARGEABLE SUPPLY: Performed by: STUDENT IN AN ORGANIZED HEALTH CARE EDUCATION/TRAINING PROGRAM

## 2025-07-01 PROCEDURE — 6360000002 HC RX W HCPCS: Performed by: ANESTHESIOLOGY

## 2025-07-01 PROCEDURE — 3609010300 HC COLONOSCOPY W/BIOPSY SINGLE/MULTIPLE: Performed by: STUDENT IN AN ORGANIZED HEALTH CARE EDUCATION/TRAINING PROGRAM

## 2025-07-01 PROCEDURE — 3609012400 HC EGD TRANSORAL BIOPSY SINGLE/MULTIPLE: Performed by: STUDENT IN AN ORGANIZED HEALTH CARE EDUCATION/TRAINING PROGRAM

## 2025-07-01 RX ORDER — LIDOCAINE HYDROCHLORIDE 10 MG/ML
1 INJECTION, SOLUTION EPIDURAL; INFILTRATION; INTRACAUDAL; PERINEURAL
Status: COMPLETED | OUTPATIENT
Start: 2025-07-01 | End: 2025-07-01

## 2025-07-01 RX ORDER — SODIUM CHLORIDE 9 MG/ML
INJECTION, SOLUTION INTRAVENOUS PRN
Status: DISCONTINUED | OUTPATIENT
Start: 2025-07-01 | End: 2025-07-01 | Stop reason: HOSPADM

## 2025-07-01 RX ORDER — SODIUM CHLORIDE 0.9 % (FLUSH) 0.9 %
5-40 SYRINGE (ML) INJECTION EVERY 12 HOURS SCHEDULED
Status: DISCONTINUED | OUTPATIENT
Start: 2025-07-01 | End: 2025-07-01 | Stop reason: HOSPADM

## 2025-07-01 RX ORDER — SODIUM CHLORIDE, SODIUM LACTATE, POTASSIUM CHLORIDE, CALCIUM CHLORIDE 600; 310; 30; 20 MG/100ML; MG/100ML; MG/100ML; MG/100ML
INJECTION, SOLUTION INTRAVENOUS CONTINUOUS
Status: DISCONTINUED | OUTPATIENT
Start: 2025-07-01 | End: 2025-07-01 | Stop reason: HOSPADM

## 2025-07-01 RX ORDER — LIDOCAINE HYDROCHLORIDE 20 MG/ML
INJECTION, SOLUTION INFILTRATION; PERINEURAL
Status: DISCONTINUED | OUTPATIENT
Start: 2025-07-01 | End: 2025-07-01 | Stop reason: SDUPTHER

## 2025-07-01 RX ORDER — SODIUM CHLORIDE 0.9 % (FLUSH) 0.9 %
5-40 SYRINGE (ML) INJECTION PRN
Status: DISCONTINUED | OUTPATIENT
Start: 2025-07-01 | End: 2025-07-01 | Stop reason: HOSPADM

## 2025-07-01 RX ORDER — OMEPRAZOLE 40 MG/1
40 CAPSULE, DELAYED RELEASE ORAL
Qty: 60 CAPSULE | Refills: 1 | Status: SHIPPED
Start: 2025-07-01 | End: 2025-07-01 | Stop reason: SDUPTHER

## 2025-07-01 RX ORDER — PROPOFOL 10 MG/ML
INJECTION, EMULSION INTRAVENOUS
Status: DISCONTINUED | OUTPATIENT
Start: 2025-07-01 | End: 2025-07-01 | Stop reason: SDUPTHER

## 2025-07-01 RX ADMIN — LIDOCAINE HYDROCHLORIDE 80 MG: 20 INJECTION, SOLUTION INFILTRATION; PERINEURAL at 08:04

## 2025-07-01 RX ADMIN — PROPOFOL 150 MCG/KG/MIN: 10 INJECTION, EMULSION INTRAVENOUS at 08:04

## 2025-07-01 RX ADMIN — LIDOCAINE HYDROCHLORIDE 1 ML: 10 INJECTION, SOLUTION EPIDURAL; INFILTRATION; INTRACAUDAL; PERINEURAL at 06:52

## 2025-07-01 ASSESSMENT — ENCOUNTER SYMPTOMS
RESPIRATORY NEGATIVE: 1
CONSTIPATION: 1

## 2025-07-01 ASSESSMENT — PAIN - FUNCTIONAL ASSESSMENT
PAIN_FUNCTIONAL_ASSESSMENT: NONE - DENIES PAIN
PAIN_FUNCTIONAL_ASSESSMENT: NONE - DENIES PAIN

## 2025-07-01 NOTE — DISCHARGE INSTRUCTIONS
straining to pass stools. Relax and give yourself time to let things happen naturally.  Do not hold your breath while passing stools.  Do not read while sitting on the toilet. Get off the toilet as soon as you have finished.  Take your medicines exactly as prescribed. Call your doctor if you think you are having a problem with your medicine.  When should you call for help?   Call 911 anytime you think you may need emergency care. For example, call if:    You pass maroon or very bloody stools.   Call your doctor now or seek immediate medical care if:    You have increased pain.     You have increased bleeding.   Watch closely for changes in your health, and be sure to contact your doctor if:    Your symptoms have not improved after 3 or 4 days.   Where can you learn more?  Go to https://www.[employer].net/patientEd and enter F228 to learn more about \"Hemorrhoids: Care Instructions.\"  Current as of: October 19, 2024  Content Version: 14.5  © 2024-2025 [employer].   Care instructions adapted under license by [employer]. If you have questions about a medical condition or this instruction, always ask your healthcare professional. [employer], [email or web address], disclaims any warranty or liability for your use of this information.

## 2025-07-01 NOTE — OP NOTE
Esophagogastroduodenoscopy    DATE OF PROCEDURE: 7/1/2025     SURGEON: Maverick Chung MD  Facility: [facility]  ASSISTANT: None  PREOPERATIVE DIAGNOSIS: GERD, epigastric pain, bloating    Diagnosis:    POSTOPERATIVE DIAGNOSIS: As described below (see findings and impression)    OPERATION: Upper GI endoscopy with Biopsy    ANESTHESIA: Monitored Anesthesia Care (MAC)     ESTIMATED BLOOD LOSS: Less than 50 ml    COMPLICATIONS: None.     SPECIMENS:  Was Obtained:     ID Type Source Tests Collected by Time Destination   A : DUODENAL BIOPSY Tissue Duodenum SURGICAL PATHOLOGY Maverick Chung MD 7/1/2025 0806    B : STOMACH BIOPSY Tissue Stomach SURGICAL PATHOLOGY Maverick Chung MD 7/1/2025 0810    C : GASTRIC POLYP Tissue Gastric SURGICAL PATHOLOGY Maverick Chung MD 7/1/2025 0811    D : IRREGULAR Z LINE Tissue Stomach SURGICAL PATHOLOGY Maverick Chung MD 7/1/2025 0813         HISTORY: The patient is a 59 y.o. year old female with history of above preop diagnosis.  I recommended esophagogastroduodenoscopy with possible biopsy and I explained the risk, benefits, expected outcome, and alternatives to the procedure.  Risks included but are not limited to bleeding, infection, respiratory distress, hypotension, and perforation of the esophagus, stomach, or duodenum.  Patient understands and is in agreement.      PROCEDURE: The patient was given IV Monitored Anesthesia Care (MAC) and vitals monitoring per anesthesia department.  The patient was placed in the left lateral decubitus position. The patient was monitored continuously with ECG tracing, pulse oximetry, blood pressure monitoring, and direct observation. The gastroscope was inserted into the mouth and advanced under direct vision to second portion of the duodenum.  A careful inspection was made as the gastroscope was withdrawn, including a retroflexed view of the proximal stomach; findings and interventions are described below. Appropriate

## 2025-07-01 NOTE — PROGRESS NOTES
No [id number] prescriber sent post-op Rx to mail order [facility] - [place], [place] - [id number] - P [phone]

## 2025-07-01 NOTE — H&P
HISTORY and PHYSICAL  [facility]       NAME:  Merlyn Angulo  MRN: [id number]   YOB: 1965   Date: 7/1/2025   Age: 59 y.o.  Gender: female       COMPLAINT AND PRESENT HISTORY:     Merlyn Angulo is 59 y.o.,  female, presents for ESOPHAGOGASTRODUODENOSCOPY BIOPSY, COLONOSCOPY DIAGNOSTIC     Primary dx: History of adenomatous polyp of colon [Z86.0101]  Gastroesophageal reflux disease, unspecified whether esophagitis present [K21.9].    HPI:  Merlyn Angulo is 59 y.o.,  female, will be having a Colonoscopy and EGD. Prior Colonoscopy and EGD was done 2021.  Patient has hx of Colon Polyps. Patient has positive FH of Colon Cancer in grandmother   Patient reports changes in bowel habits.she has intermittent constipation and some itching in the opal area . Currently she is taking  probiotics  and she has BM once every day,  No GI /Rectal bleeding, experiencing red/ black/ BRBPR stools.    Pt denies abdominal pain , no N/V, no abdominal bloating or weight loss.   Patient denies any Dysphagia.  Pt has hx of GERD   Pt is on a PPI, that is helping to control symptoms.     No fever or chills, chest pain or SOB   Prep fully completed: yes . Pt reports her last BM is clear liquid     Review of additional significant medical hx:  (See chart for additional detail, including current medications /see ROS for current S/S):     NPO status: pt Npo since the past midnight  Medications taken TODAY (with sip of water): pt took her BP mediation with sip of water   Anticoagulation status: none    Denies personal hx of blood clots.  Denies personal hx of MRSA infection.  Pt has PONV, denies any personal or family hx of previous complications w/anesthesia.    RECENT IMAGING R/T HPI   No results found.    RECENT LABS, IMAGING AND TESTING     Lab Results   Component Value Date    WBC 10.1 03/27/2024    RBC 4.15 03/27/2024    HGB 12.7 03/27/2024    HCT 37.8 03/27/2024    MCV 91.1 03/27/2024    MCH 30.6 03/27/2024    MCHC 33.6

## 2025-07-01 NOTE — ANESTHESIA POSTPROCEDURE EVALUATION
Department of Anesthesiology  Postprocedure Note    Patient: Merlyn Angulo  MRN: [id number]  YOB: 1965  Date of evaluation: 7/1/2025    Procedure Summary       Date: 07/01/25 Room / Location: [place] [id number] / [facility]    Anesthesia Start: 0752 Anesthesia Stop: 0847    Procedures:       ESOPHAGOGASTRODUODENOSCOPY BIOPSY (Esophagus)      COLONOSCOPY BIOPSY Diagnosis:       History of adenomatous polyp of colon      Gastroesophageal reflux disease, unspecified whether esophagitis present      (History of adenomatous polyp of colon [Z86.0101])      (Gastroesophageal reflux disease, unspecified whether esophagitis present [K21.9])    Surgeons: Maverick Chung MD Responsible Provider: Tan Maldonado MD    Anesthesia Type: general, TIVA ASA Status: 2            Anesthesia Type: No value filed.    Han Phase I: Han Score: 10    Han Phase II: Han Score: 5    Anesthesia Post Evaluation    Comments: POST- ANESTHESIA EVALUATION       Pt Name: Merlyn Angulo  MRN: [id number]  YOB: 1965  Date of evaluation: 7/1/2025  Time:  2:09 PM      /79   Pulse 70   Temp 98 °F (36.7 °C) (Infrared)   Resp 17   Ht 1.6 m (5' 3\")   Wt 61.2 kg (135 lb)   LMP 07/29/2015 (Approximate)   SpO2 98%   BMI 23.91 kg/m²      Consciousness Level  Awake  Cardiopulmonary Status  Stable  Pain Adequately Treated YES  Nausea / Vomiting  NO  Adequate Hydration  YES  Anesthesia Related Complications NONE      Electronically signed by Tan Maldonado MD on 7/1/2025 at 2:09 PM           No notable events documented.

## 2025-07-01 NOTE — ANESTHESIA PRE PROCEDURE
Department of Anesthesiology  Preprocedure Note       Name:  Merlyn Angulo   Age:  59 y.o.  :  1965                                          MRN:  [id number]         Date:  2025      Surgeon: Surgeon(s):  Maverick Chung MD    Procedure: Procedure(s):  ESOPHAGOGASTRODUODENOSCOPY BIOPSY  COLONOSCOPY DIAGNOSTIC    Medications prior to admission:   Prior to Admission medications    Medication Sig Start Date End Date Taking? Authorizing Provider   pantoprazole (PROTONIX) 40 MG tablet TAKE 1 TABLET BY MOUTH DAILY 25  Yes Ector Martini APRN - CNP   escitalopram (LEXAPRO) 10 MG tablet TAKE 1 TABLET BY MOUTH DAILY 25  Yes Ector Martini APRN - CNP   potassium chloride (KLOR-CON) 10 MEQ extended release tablet TAKE 1 TABLET BY MOUTH DAILY 25  Yes Ector Martini APRN - CNP   LORazepam (ATIVAN) 0.5 MG tablet Take 1 tablet by mouth daily as needed for Anxiety for up to 90 days. Max Daily Amount: 0.5 mg 4/11/25 7/10/25 Yes Ector Martini APRN - CNP   buPROPion (WELLBUTRIN XL) 150 MG extended release tablet TAKE 1 TABLET BY MOUTH ONCE DAILY 3/13/25  Yes Ector Martini APRN - CNP   hydroCHLOROthiazide (HYDRODIURIL) 25 MG tablet TAKE 1 TABLET BY MOUTH DAILY 3/13/25  Yes Ector Martini APRN - CNP   senna (SENOKOT) 8.6 MG TABS tablet Take 2 tablets by mouth daily as needed for Constipation  Patient not taking: Reported on 2025   Maverick Chung MD   bisacodyl 5 MG EC tablet Use as instructed from your physicians office for your colonoscopy prep.  Patient not taking: Reported on 2025   Maverick Chung MD       Current medications:    Current Facility-Administered Medications   Medication Dose Route Frequency Provider Last Rate Last Admin   • sodium chloride flush 0.9 % injection 5-40 mL  5-40 mL IntraVENous 2 times per day Jesse Ocasio MD       • sodium chloride flush 0.9 % injection 5-40 mL  5-40 mL IntraVENous PRN Jesse Ocasio MD

## 2025-07-01 NOTE — OP NOTE
COLONOSCOPY    DATE OF PROCEDURE: 7/1/2025    SURGEON: Maverick Chung MD  Facility : [facility]  ASSISTANT: None  PREOPERATIVE DIAGNOSIS: History of polyp    POSTOPERATIVE DIAGNOSIS: as described below    OPERATION: Total colonoscopy with biopsy    ANESTHESIA: Monitored Anesthesia Care (MAC)    ESTIMATED BLOOD LOSS: less than 50 cc    COMPLICATIONS: None.     SPECIMENS:  Was Obtained:     ID Type Source Tests Collected by Time Destination   A : DUODENAL BIOPSY Tissue Duodenum SURGICAL PATHOLOGY Maverick Chung MD 7/1/2025 0806    B : STOMACH BIOPSY Tissue Stomach SURGICAL PATHOLOGY Maverick Chung MD 7/1/2025 0810    C : GASTRIC POLYP Tissue Gastric SURGICAL PATHOLOGY Maverick Chung MD 7/1/2025 0811    D : IRREGULAR Z LINE Tissue Stomach SURGICAL PATHOLOGY Maverick Chung MD 7/1/2025 0813    E : SIGMOID COLON POLYP BIOPSY Tissue Sigmoid Colon SURGICAL PATHOLOGY Maverick Chung MD 7/1/2025 0840         HISTORY: The patient is a 59 y.o. year old female with history of above preop diagnosis.  I recommended colonoscopy with possible biopsy or polypectomy and I explained the risk, benefits, expected outcome, and alternatives to the procedure.  Risks included but are not limited to medication allergy, medication reaction, cardiovascular and respiratory problems, bleeding, perforation, infection, and/or missed diagnosis.  The patient understands and is in agreement.        PROCEDURE: Following arrival in the endoscopy room, the patient was placed in the left lateral decubitus position and final time-out accomplished in the presence of the nursing staff. Baseline vital signs were obtained and reviewed. The patient was given IV Monitored Anesthesia Care (MAC) and vitals monitoring per anesthesia department.     Digital rectal exam- abnormal: Small external hemorrhoids    The colonoscope was inserted per rectum and advanced under direct vision to the cecum without difficulty.  Photodocumentation of the

## 2025-07-02 LAB — SURGICAL PATHOLOGY REPORT: NORMAL

## 2025-08-06 DIAGNOSIS — Z76.0 MEDICATION REFILL: ICD-10-CM

## 2025-08-06 RX ORDER — HYDROCHLOROTHIAZIDE 25 MG/1
25 TABLET ORAL DAILY
Qty: 30 TABLET | Refills: 2 | Status: SHIPPED | OUTPATIENT
Start: 2025-08-06

## 2025-09-04 ENCOUNTER — OFFICE VISIT (OUTPATIENT)
Dept: GASTROENTEROLOGY | Age: 60
End: 2025-09-04
Payer: COMMERCIAL

## 2025-09-04 VITALS — HEART RATE: 81 BPM | HEIGHT: 63 IN | OXYGEN SATURATION: 95 % | WEIGHT: 145 LBS | BODY MASS INDEX: 25.69 KG/M2

## 2025-09-04 DIAGNOSIS — K21.9 GASTROESOPHAGEAL REFLUX DISEASE, UNSPECIFIED WHETHER ESOPHAGITIS PRESENT: Primary | ICD-10-CM

## 2025-09-04 DIAGNOSIS — Z86.0101 HISTORY OF ADENOMATOUS POLYP OF COLON: ICD-10-CM

## 2025-09-04 PROCEDURE — 99214 OFFICE O/P EST MOD 30 MIN: CPT | Performed by: STUDENT IN AN ORGANIZED HEALTH CARE EDUCATION/TRAINING PROGRAM

## 2025-09-04 RX ORDER — OMEPRAZOLE 40 MG/1
40 CAPSULE, DELAYED RELEASE ORAL
Qty: 60 CAPSULE | Refills: 1 | Status: SHIPPED | OUTPATIENT
Start: 2025-09-04

## 2025-09-04 RX ORDER — PANTOPRAZOLE SODIUM 40 MG/1
TABLET, DELAYED RELEASE ORAL
COMMUNITY
Start: 2025-08-14 | End: 2025-09-04

## 2025-09-04 RX ORDER — LORAZEPAM 0.5 MG/1
TABLET ORAL
COMMUNITY
Start: 2025-08-12

## (undated) DEVICE — FORCEPS BX L240CM WRK CHN 2.8MM STD CAP W/ NDL MIC MESH

## (undated) DEVICE — STERILE POLYISOPRENE POWDER-FREE SURGICAL GLOVES WITH EMOLLIENT COATING: Brand: PROTEXIS

## (undated) DEVICE — GLOVE ORANGE PI 8 1/2   MSG9085

## (undated) DEVICE — SUTURE MCRYL SZ 3 0 L18IN ABSRB UD PS 2 3 8 CIR REV CUT NDL MCP497G

## (undated) DEVICE — PENCIL ES L3M BTTN SWCH HOLSTER W/ BLDE ELECTRD EDGE

## (undated) DEVICE — GAUZE,SPONGE,4"X4",16PLY,STRL,LF,10/TRAY: Brand: MEDLINE

## (undated) DEVICE — SYRINGE MED 10ML LUERLOCK TIP W/O SFTY DISP

## (undated) DEVICE — STERILE POLYISOPRENE POWDER-FREE SURGICAL GLOVES: Brand: PROTEXIS

## (undated) DEVICE — SPONGE LAP W18XL18IN WHT ENHANCEDXRAY VISIBILITY DATA MSTR

## (undated) DEVICE — RESERVOIR,SUCTION,100CC,SILICONE: Brand: MEDLINE

## (undated) DEVICE — Z DISCONTINUED BY MEDLINE USE 2711682 TRAY SKIN PREP DRY W/ PREM GLV

## (undated) DEVICE — ADHESIVE SKIN CLSR 0.7ML SGL PEEL PCH GEL WTRPRF FOR WOUNDS

## (undated) DEVICE — BANDAGE,GAUZE,BULKEE II,4.5"X4.1YD,STRL: Brand: MEDLINE

## (undated) DEVICE — SOLUTION IV IRRIG POUR BRL 0.9% SODIUM CHL 2F7124

## (undated) DEVICE — GOWN,POLY REINFORCED,LG: Brand: MEDLINE

## (undated) DEVICE — SYRINGE MED 3ML CLR PLAS STD N CTRL LUERLOCK TIP DISP

## (undated) DEVICE — GLOVE SURG 8 11.7IN BEAD CUF LIGHT BRN SENSICARE LTX FREE

## (undated) DEVICE — STRAP,POSITIONING,KNEE/BODY,FOAM,4X60": Brand: MEDLINE

## (undated) DEVICE — SOLUTION IV IRRIG WATER 1000ML POUR BRL 2F7114

## (undated) DEVICE — TRAP SURG QUAD PARABOLA SLOT DSGN SFTY SCRN TRAPEASE

## (undated) DEVICE — ADHESIVE SKIN CLOSURE TOP 36 CC HI VISC DERMBND MINI

## (undated) DEVICE — 3M™ STERI-STRIP™ REINFORCED ADHESIVE SKIN CLOSURES, R1547, 1/2 IN X 4 IN (12 MM X 100 MM), 6 STRIPS/ENVELOPE: Brand: 3M™ STERI-STRIP™

## (undated) DEVICE — ELECTRODE PT RET AD L9FT HI MOIST COND ADH HYDRGEL CORDED

## (undated) DEVICE — 6 ML SYRINGE LUER-LOCK TIP: Brand: MONOJECT

## (undated) DEVICE — NEEDLE SYR 18GA L1.5IN RED PLAS HUB S STL BLNT FILL W/O

## (undated) DEVICE — SYRINGE MED 50ML LUERLOCK TIP

## (undated) DEVICE — SYRINGE 20ML LL S/C 50

## (undated) DEVICE — TOWEL,OR,DSP,ST,BLUE,STD,6/PK,12PK/CS: Brand: MEDLINE

## (undated) DEVICE — DECANTER FLD 9IN ST BG FOR ASEP TRNSF OF FLD

## (undated) DEVICE — CONMED DISPOSABLE GASTROINTESTINAL CYTOLOGY BRUSH, STRAIGHT HANDLE, 2.5 MM X 160 CM: Brand: CONMED

## (undated) DEVICE — MHPB HEAD AND NECK  PACK: Brand: MEDLINE INDUSTRIES, INC.

## (undated) DEVICE — INTENDED FOR TISSUE SEPARATION, AND OTHER PROCEDURES THAT REQUIRE A SHARP SURGICAL BLADE TO PUNCTURE OR CUT.: Brand: BARD-PARKER ® CARBON RIB-BACK BLADES

## (undated) DEVICE — MEDICINE CUP, GRADUATED, STER: Brand: MEDLINE

## (undated) DEVICE — BLOCK BITE 60FR RUBBER ADLT DENTAL

## (undated) DEVICE — GAUZE,SPONGE,FLUFF,6"X6.75",STRL,5/TRAY: Brand: MEDLINE

## (undated) DEVICE — COUNTER NDL 40 COUNT HLD 70 FOAM BLK ADH W/ MAG

## (undated) DEVICE — TUBING, SUCTION, 1/4" X 12', STRAIGHT: Brand: MEDLINE

## (undated) DEVICE — 3M™ PRECISE™ VISTA DISPOSABLE SKIN STAPLER 3995: Brand: 3M™ PRECISE™

## (undated) DEVICE — CAUTERY TIP POLISHER: Brand: DEVON

## (undated) DEVICE — CO2 CANNULA,SUPERSOFT, ADLT,7'O2,7'CO2: Brand: MEDLINE

## (undated) DEVICE — PENCIL SMK EVAC L10FT DIA95MM TBNG NONSTICK W ADPT TO 22MM

## (undated) DEVICE — Device

## (undated) DEVICE — GOWN,AURORA,NONREINFORCED,LARGE: Brand: MEDLINE

## (undated) DEVICE — PREMIUM DRY TRAY LF: Brand: MEDLINE INDUSTRIES, INC.

## (undated) DEVICE — BINDER ABD UNISX 4 PNL PREM 6INX6INX12IN L XL 4

## (undated) DEVICE — SYRINGE BLB 2 PC STER 50

## (undated) DEVICE — BITEBLOCK 54FR W/ DENT RIM BLOX

## (undated) DEVICE — ADHESIVE SKIN CLSR 0.7ML TOP DERMBND ADV

## (undated) DEVICE — ELECTRODE ELECSURG NDL 2.8 INX7.2 CM COAT INSUL EDGE

## (undated) DEVICE — SUTURE PROL SZ 5-0 L18IN NONABSORBABLE BLU L13MM P-3 3/8 8698G

## (undated) DEVICE — DRAPE,T,LAPARO,TRANS,STERILE: Brand: MEDLINE

## (undated) DEVICE — MHPB GEN MINOR PACK: Brand: MEDLINE INDUSTRIES, INC.

## (undated) DEVICE — DRAIN SURG 15FR L3 16IN DIA47MM SIL RND HUBLESS FULL FLUT

## (undated) DEVICE — GOWN,AURORA,NONRNF,XL,30/CS: Brand: MEDLINE

## (undated) DEVICE — GLOVE SURG SZ 65 THK91MIL LTX FREE SYN POLYISOPRENE

## (undated) DEVICE — SUTURE VCRL + SZ 2-0 L27IN ABSRB CLR CT-1 1/2 CIR TAPERCUT VCP259H

## (undated) DEVICE — SOLUTION SURG PREP ANTIMICROBIAL 4 OZ SKIN WND EXIDINE

## (undated) DEVICE — JELLY,LUBE,STERILE,FLIP TOP,TUBE,2-OZ: Brand: MEDLINE

## (undated) DEVICE — Z DISCONTINUED USE 2558681 BANDAGE COMPR L MAMM COMFORTABLE HIGHLY ABSRB POST SURG

## (undated) DEVICE — SUTURE MCRYL + SZ 5 0 L18IN ABSRB UD L13MM P 3 3 8 CIR PRIM MCP493G

## (undated) DEVICE — SOLUTION SURG PREP POV IOD 7.5% 4 OZ

## (undated) DEVICE — BASIN EMSIS 700ML GRAPHITE PLAS KID SHP GRAD

## (undated) DEVICE — STRIP,CLOSURE,WOUND,MEDI-STRIP,1/2X4: Brand: MEDLINE

## (undated) DEVICE — SOLUTION IV IRRIG 500ML 0.9% SODIUM CHL 2F7123

## (undated) DEVICE — TOWEL,OR,DSP,ST,NATURAL,DLX,4/PK,20PK/CS: Brand: MEDLINE

## (undated) DEVICE — TUBING INFLTR L13FT CNTOUR GEN DISP FOR LIPO

## (undated) DEVICE — STANDARD HYPODERMIC NEEDLE,POLYPROPYLENE HUB: Brand: MONOJECT

## (undated) DEVICE — TUBING, SUCTION, 3/16" X 10', STRAIGHT: Brand: MEDLINE

## (undated) DEVICE — SUTURE MCRYL SZ 4-0 L18IN ABSRB UD P-3 L13MM 3/8 CIR PRIM Y494G

## (undated) DEVICE — SUTURE MCRYL + SZ 2 0 L27IN ABSRB UD CP 1 L36MM 1 2 CIR REV MCP266H

## (undated) DEVICE — SNARE ENDOSCP M L240CM LOOP W27MM SHTH DIA2.4MM OVL FLX

## (undated) DEVICE — RETRIEVER SPEC L230CM DIA2.5MM POLYPR FOR TISS RETRV ROTH

## (undated) DEVICE — PROTECTOR CORNEAL AD W23.5XL25.8MM FOR SCLER PROTCT CROUCH

## (undated) DEVICE — 3M™ STERI-STRIP™ BLEND TONE SKIN CLOSURES, B1551, TAN, 1/4 IN X 3 IN (6 MM X 75 MM), 3 STRIPS/ENVELOPE: Brand: 3M™ STERI-STRIP™

## (undated) DEVICE — CUP MED 1OZ CLR POLYPR FEED GRAD W/O LID

## (undated) DEVICE — Device: Brand: DEFENDO VALVE AND CONNECTOR KIT

## (undated) DEVICE — DRESSING,GAUZE,XEROFORM,CURAD,5"X9",ST: Brand: CURAD

## (undated) DEVICE — ENDOSCOPIC KIT 1.1+ 10 FT OP4 CA DE

## (undated) DEVICE — DRAPE,REIN 53X77,STERILE: Brand: MEDLINE

## (undated) DEVICE — ADAPTER TBNG LUER STUB 15 GA INTMED

## (undated) DEVICE — SINGLE USE AIR/WATER, SUCTION AND BIOPSY VALVES SET: Brand: ORCAPOD™

## (undated) DEVICE — TUBING ASPIR L12FT FOR LIPO SYS PSI-TEC